# Patient Record
Sex: MALE | Race: WHITE | Employment: OTHER | URBAN - METROPOLITAN AREA
[De-identification: names, ages, dates, MRNs, and addresses within clinical notes are randomized per-mention and may not be internally consistent; named-entity substitution may affect disease eponyms.]

---

## 2021-09-30 ENCOUNTER — HOSPITAL ENCOUNTER (INPATIENT)
Age: 76
LOS: 20 days | Discharge: HOSPICE | DRG: 673 | End: 2021-10-20
Attending: EMERGENCY MEDICINE | Admitting: HOSPITALIST
Payer: MEDICARE

## 2021-09-30 ENCOUNTER — APPOINTMENT (OUTPATIENT)
Dept: CT IMAGING | Age: 76
DRG: 673 | End: 2021-09-30
Payer: MEDICARE

## 2021-09-30 DIAGNOSIS — N39.0 URINARY TRACT INFECTION WITHOUT HEMATURIA, SITE UNSPECIFIED: ICD-10-CM

## 2021-09-30 DIAGNOSIS — R41.82 ALTERED MENTAL STATUS, UNSPECIFIED ALTERED MENTAL STATUS TYPE: ICD-10-CM

## 2021-09-30 DIAGNOSIS — N17.9 ACUTE RENAL FAILURE SUPERIMPOSED ON CHRONIC KIDNEY DISEASE, UNSPECIFIED CKD STAGE, UNSPECIFIED ACUTE RENAL FAILURE TYPE (HCC): Primary | ICD-10-CM

## 2021-09-30 DIAGNOSIS — N18.9 ACUTE RENAL FAILURE SUPERIMPOSED ON CHRONIC KIDNEY DISEASE, UNSPECIFIED CKD STAGE, UNSPECIFIED ACUTE RENAL FAILURE TYPE (HCC): Primary | ICD-10-CM

## 2021-09-30 PROBLEM — G93.40 ACUTE ENCEPHALOPATHY: Status: ACTIVE | Noted: 2021-09-30

## 2021-09-30 LAB
A/G RATIO: 1.3 (ref 1.1–2.2)
ABO/RH: NORMAL
ALBUMIN SERPL-MCNC: 3.2 G/DL (ref 3.4–5)
ALP BLD-CCNC: 75 U/L (ref 40–129)
ALT SERPL-CCNC: 12 U/L (ref 10–40)
AMMONIA: 12 UMOL/L (ref 16–60)
ANION GAP SERPL CALCULATED.3IONS-SCNC: 13 MMOL/L (ref 3–16)
ANTIBODY SCREEN: NORMAL
AST SERPL-CCNC: 12 U/L (ref 15–37)
BACTERIA: ABNORMAL /HPF
BASE EXCESS VENOUS: -10.4 MMOL/L (ref -3–3)
BASOPHILS ABSOLUTE: 0 K/UL (ref 0–0.2)
BASOPHILS RELATIVE PERCENT: 0.5 %
BILIRUB SERPL-MCNC: 0.4 MG/DL (ref 0–1)
BILIRUBIN URINE: NEGATIVE
BLOOD, URINE: ABNORMAL
BUN BLDV-MCNC: 50 MG/DL (ref 7–20)
CALCIUM SERPL-MCNC: 8.8 MG/DL (ref 8.3–10.6)
CARBOXYHEMOGLOBIN: 1.4 % (ref 0–1.5)
CHLORIDE BLD-SCNC: 112 MMOL/L (ref 99–110)
CLARITY: ABNORMAL
CO2: 20 MMOL/L (ref 21–32)
COLOR: YELLOW
CREAT SERPL-MCNC: 3.4 MG/DL (ref 0.8–1.3)
EOSINOPHILS ABSOLUTE: 0.2 K/UL (ref 0–0.6)
EOSINOPHILS RELATIVE PERCENT: 5 %
EPITHELIAL CELLS, UA: ABNORMAL /HPF (ref 0–5)
GFR AFRICAN AMERICAN: 22
GFR NON-AFRICAN AMERICAN: 18
GLOBULIN: 2.5 G/DL
GLUCOSE BLD-MCNC: 151 MG/DL (ref 70–99)
GLUCOSE URINE: NEGATIVE MG/DL
HCO3 VENOUS: 15.4 MMOL/L (ref 23–29)
HCT VFR BLD CALC: 28.1 % (ref 40.5–52.5)
HEMOGLOBIN: 9.3 G/DL (ref 13.5–17.5)
KETONES, URINE: NEGATIVE MG/DL
LACTIC ACID: 0.9 MMOL/L (ref 0.4–2)
LEUKOCYTE ESTERASE, URINE: ABNORMAL
LYMPHOCYTES ABSOLUTE: 0.6 K/UL (ref 1–5.1)
LYMPHOCYTES RELATIVE PERCENT: 12.2 %
MCH RBC QN AUTO: 32.7 PG (ref 26–34)
MCHC RBC AUTO-ENTMCNC: 33.1 G/DL (ref 31–36)
MCV RBC AUTO: 98.8 FL (ref 80–100)
METHEMOGLOBIN VENOUS: 0.5 %
MICROSCOPIC EXAMINATION: YES
MONOCYTES ABSOLUTE: 0.4 K/UL (ref 0–1.3)
MONOCYTES RELATIVE PERCENT: 7.4 %
NEUTROPHILS ABSOLUTE: 3.5 K/UL (ref 1.7–7.7)
NEUTROPHILS RELATIVE PERCENT: 74.9 %
NITRITE, URINE: NEGATIVE
O2 SAT, VEN: 81 %
O2 THERAPY: ABNORMAL
OCCULT BLOOD SCREENING: NORMAL
PCO2, VEN: 33.7 MMHG (ref 40–50)
PDW BLD-RTO: 15 % (ref 12.4–15.4)
PH UA: 7.5 (ref 5–8)
PH VENOUS: 7.28 (ref 7.35–7.45)
PLATELET # BLD: 122 K/UL (ref 135–450)
PMV BLD AUTO: 8.7 FL (ref 5–10.5)
PO2, VEN: 48.5 MMHG (ref 25–40)
POTASSIUM SERPL-SCNC: 4.4 MMOL/L (ref 3.5–5.1)
PROCALCITONIN: 0.22 NG/ML (ref 0–0.15)
PROTEIN UA: 30 MG/DL
RBC # BLD: 2.85 M/UL (ref 4.2–5.9)
RBC UA: ABNORMAL /HPF (ref 0–4)
SODIUM BLD-SCNC: 145 MMOL/L (ref 136–145)
SPECIFIC GRAVITY UA: 1.01 (ref 1–1.03)
SPECIMEN STATUS: NORMAL
TCO2 CALC VENOUS: 16 MMOL/L
TOTAL PROTEIN: 5.7 G/DL (ref 6.4–8.2)
TROPONIN: 0.1 NG/ML
URINE TYPE: ABNORMAL
UROBILINOGEN, URINE: 0.2 E.U./DL
WBC # BLD: 4.7 K/UL (ref 4–11)
WBC UA: ABNORMAL /HPF (ref 0–5)

## 2021-09-30 PROCEDURE — 2580000003 HC RX 258: Performed by: NURSE PRACTITIONER

## 2021-09-30 PROCEDURE — 82803 BLOOD GASES ANY COMBINATION: CPT

## 2021-09-30 PROCEDURE — 84439 ASSAY OF FREE THYROXINE: CPT

## 2021-09-30 PROCEDURE — 86901 BLOOD TYPING SEROLOGIC RH(D): CPT

## 2021-09-30 PROCEDURE — 70450 CT HEAD/BRAIN W/O DYE: CPT

## 2021-09-30 PROCEDURE — 87040 BLOOD CULTURE FOR BACTERIA: CPT

## 2021-09-30 PROCEDURE — 99284 EMERGENCY DEPT VISIT MOD MDM: CPT

## 2021-09-30 PROCEDURE — 93005 ELECTROCARDIOGRAM TRACING: CPT | Performed by: NURSE PRACTITIONER

## 2021-09-30 PROCEDURE — 86850 RBC ANTIBODY SCREEN: CPT

## 2021-09-30 PROCEDURE — 84443 ASSAY THYROID STIM HORMONE: CPT

## 2021-09-30 PROCEDURE — 81001 URINALYSIS AUTO W/SCOPE: CPT

## 2021-09-30 PROCEDURE — 83605 ASSAY OF LACTIC ACID: CPT

## 2021-09-30 PROCEDURE — 85025 COMPLETE CBC W/AUTO DIFF WBC: CPT

## 2021-09-30 PROCEDURE — 82270 OCCULT BLOOD FECES: CPT

## 2021-09-30 PROCEDURE — 6360000002 HC RX W HCPCS: Performed by: NURSE PRACTITIONER

## 2021-09-30 PROCEDURE — 86900 BLOOD TYPING SEROLOGIC ABO: CPT

## 2021-09-30 PROCEDURE — 36415 COLL VENOUS BLD VENIPUNCTURE: CPT

## 2021-09-30 PROCEDURE — 84484 ASSAY OF TROPONIN QUANT: CPT

## 2021-09-30 PROCEDURE — 96365 THER/PROPH/DIAG IV INF INIT: CPT

## 2021-09-30 PROCEDURE — 1200000000 HC SEMI PRIVATE

## 2021-09-30 PROCEDURE — 84145 PROCALCITONIN (PCT): CPT

## 2021-09-30 PROCEDURE — 82140 ASSAY OF AMMONIA: CPT

## 2021-09-30 PROCEDURE — 80053 COMPREHEN METABOLIC PANEL: CPT

## 2021-09-30 RX ORDER — DEXTROSE MONOHYDRATE 50 MG/ML
100 INJECTION, SOLUTION INTRAVENOUS PRN
Status: DISCONTINUED | OUTPATIENT
Start: 2021-09-30 | End: 2021-10-20 | Stop reason: HOSPADM

## 2021-09-30 RX ORDER — 0.9 % SODIUM CHLORIDE 0.9 %
1000 INTRAVENOUS SOLUTION INTRAVENOUS ONCE
Status: COMPLETED | OUTPATIENT
Start: 2021-09-30 | End: 2021-09-30

## 2021-09-30 RX ORDER — SODIUM BICARBONATE 650 MG/1
650 TABLET ORAL DAILY
COMMUNITY

## 2021-09-30 RX ORDER — MAGNESIUM SULFATE IN WATER 40 MG/ML
2000 INJECTION, SOLUTION INTRAVENOUS PRN
Status: DISCONTINUED | OUTPATIENT
Start: 2021-09-30 | End: 2021-10-20 | Stop reason: HOSPADM

## 2021-09-30 RX ORDER — VITAMIN B COMPLEX
2000 TABLET ORAL DAILY
Status: DISCONTINUED | OUTPATIENT
Start: 2021-10-01 | End: 2021-09-30 | Stop reason: CLARIF

## 2021-09-30 RX ORDER — SODIUM CHLORIDE 9 MG/ML
INJECTION, SOLUTION INTRAVENOUS CONTINUOUS
Status: ACTIVE | OUTPATIENT
Start: 2021-09-30 | End: 2021-10-01

## 2021-09-30 RX ORDER — INSULIN GLARGINE 100 [IU]/ML
14 INJECTION, SOLUTION SUBCUTANEOUS NIGHTLY
Status: DISCONTINUED | OUTPATIENT
Start: 2021-09-30 | End: 2021-10-20 | Stop reason: HOSPADM

## 2021-09-30 RX ORDER — ONDANSETRON 2 MG/ML
4 INJECTION INTRAMUSCULAR; INTRAVENOUS EVERY 6 HOURS PRN
Status: DISCONTINUED | OUTPATIENT
Start: 2021-09-30 | End: 2021-10-20 | Stop reason: HOSPADM

## 2021-09-30 RX ORDER — SENNA PLUS 8.6 MG/1
1 TABLET ORAL DAILY PRN
Status: DISCONTINUED | OUTPATIENT
Start: 2021-09-30 | End: 2021-10-20 | Stop reason: HOSPADM

## 2021-09-30 RX ORDER — SODIUM CHLORIDE 0.9 % (FLUSH) 0.9 %
10 SYRINGE (ML) INJECTION EVERY 12 HOURS SCHEDULED
Status: DISCONTINUED | OUTPATIENT
Start: 2021-09-30 | End: 2021-10-20 | Stop reason: HOSPADM

## 2021-09-30 RX ORDER — NICOTINE POLACRILEX 4 MG
15 LOZENGE BUCCAL PRN
Status: DISCONTINUED | OUTPATIENT
Start: 2021-09-30 | End: 2021-10-20 | Stop reason: HOSPADM

## 2021-09-30 RX ORDER — CARVEDILOL 25 MG/1
25 TABLET ORAL 2 TIMES DAILY WITH MEALS
Status: DISCONTINUED | OUTPATIENT
Start: 2021-10-01 | End: 2021-10-05

## 2021-09-30 RX ORDER — ATORVASTATIN CALCIUM 40 MG/1
40 TABLET, FILM COATED ORAL NIGHTLY
COMMUNITY

## 2021-09-30 RX ORDER — PROMETHAZINE HYDROCHLORIDE 25 MG/1
12.5 TABLET ORAL EVERY 6 HOURS PRN
Status: DISCONTINUED | OUTPATIENT
Start: 2021-09-30 | End: 2021-10-20 | Stop reason: HOSPADM

## 2021-09-30 RX ORDER — SODIUM CHLORIDE 9 MG/ML
25 INJECTION, SOLUTION INTRAVENOUS PRN
Status: DISCONTINUED | OUTPATIENT
Start: 2021-09-30 | End: 2021-10-20 | Stop reason: HOSPADM

## 2021-09-30 RX ORDER — DEXTROSE MONOHYDRATE 25 G/50ML
12.5 INJECTION, SOLUTION INTRAVENOUS PRN
Status: DISCONTINUED | OUTPATIENT
Start: 2021-09-30 | End: 2021-10-20 | Stop reason: HOSPADM

## 2021-09-30 RX ORDER — ASPIRIN 81 MG/1
81 TABLET ORAL DAILY
Status: DISCONTINUED | OUTPATIENT
Start: 2021-10-01 | End: 2021-10-20 | Stop reason: HOSPADM

## 2021-09-30 RX ORDER — SODIUM CHLORIDE 0.9 % (FLUSH) 0.9 %
10 SYRINGE (ML) INJECTION PRN
Status: DISCONTINUED | OUTPATIENT
Start: 2021-09-30 | End: 2021-10-20 | Stop reason: HOSPADM

## 2021-09-30 RX ORDER — LINEZOLID 2 MG/ML
600 INJECTION, SOLUTION INTRAVENOUS EVERY 12 HOURS
Status: DISCONTINUED | OUTPATIENT
Start: 2021-09-30 | End: 2021-10-04

## 2021-09-30 RX ORDER — ATORVASTATIN CALCIUM 10 MG/1
20 TABLET, FILM COATED ORAL DAILY
Status: DISCONTINUED | OUTPATIENT
Start: 2021-10-01 | End: 2021-10-20 | Stop reason: HOSPADM

## 2021-09-30 RX ORDER — VITAMIN B COMPLEX
2000 TABLET ORAL DAILY
Status: DISCONTINUED | OUTPATIENT
Start: 2021-10-01 | End: 2021-10-20 | Stop reason: HOSPADM

## 2021-09-30 RX ADMIN — CEFTRIAXONE SODIUM 1000 MG: 1 INJECTION, POWDER, FOR SOLUTION INTRAMUSCULAR; INTRAVENOUS at 18:35

## 2021-09-30 RX ADMIN — SODIUM CHLORIDE 1000 ML: 9 INJECTION, SOLUTION INTRAVENOUS at 16:45

## 2021-09-30 NOTE — ED PROVIDER NOTES
I independently performed a history and physical on [facility]. All diagnostic, treatment, and disposition decisions were made by myself in conjunction with the advanced practice provider. For further details of [facility] emergency department encounter, please see the IONA/resident's documentation. Briefly, this is a 19-year-old male who presents from his nursing facility for evaluation of altered mental status, rectal bleeding. Patient has history of type 2 diabetes, hypertension. Patient has an unclear mental status baseline. He is alert, not oriented. There is no obvious rectal bleeding noted on exam today but his hemoglobin is down a number of points from prior. He is afebrile, no signs of meningitis. No focal deficits. He will likely require admission for continued management of altered mental status, serial H&H. EKG  The Ekg interpreted by myself in the emergency department in the absence of a cardiologist.  normal sinus rhythm with a rate of 80  Axis is   Normal  QTc is  465  Intervals and Durations are unremarkable. No specific ST-T wave changes appreciated. No evidence of acute ischemia.    No significant change from prior EKG dated 1/27/16       Azael Pike MD  09/30/21 1714       Azael Pike MD  09/30/21 5445

## 2021-09-30 NOTE — ED PROVIDER NOTES
[address] [place] [address]  ED  EMERGENCY DEPARTMENT ENCOUNTER      This patient was seen and evaluated by the attending physician. Pt Name: Will Us  MRN: [id number]  Gam[place]fpeg 1945  Date of evaluation: 9/30/2021  Provider: DANGELO Moreno - CNP-C  PCP: Reginaldo Lora      History provided by EMS    CHIEFCOMPLAINT:     Chief Complaint   Patient presents with    Altered Mental Status     Pt arrived [place], mental status change over the past 2 days. Pt is normally alert and oriented per EMS report. Pt now implusive alert only to self. Pt was found to have moderate blood today from rectum.        HISTORY OF PRESENT ILLNESS:      Will Us is a 78 y.o. male who presents to [address] [place] [address]  ED with complaints of altered mental state. Patient is from nursing facility, they report the patient mental status has been declining over the past month, today states that he is normally alert and oriented, currently is only oriented to self. They also mention that patient had some blood per rectum. Patient is pleasantly confused, denies any complaints but does not know why he is here where he has had he is able to tell me his name. He is here for further evaluation. LOCATION:-  QUALITY:-  SEVERITY:-  DURATION:-  MODIFYING FACTORS:-    Nursing Notes were reviewed     REVIEW OF SYSTEMS:     Review of Systems  All systems, a total of 10, are reviewed and negative except for those that were just noted in history present illness.         PAST MEDICAL HISTORY:     Past Medical History:   Diagnosis Date    CAD (coronary artery disease)     Chronic renal disease     Dr. 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 ASPIRIN 81 MG TABLET    Take 81 mg by mouth daily    CARVEDILOL (COREG) 25 MG TABLET    Take 25 mg by mouth 2 times daily (with meals). CHOLECALCIFEROL (VITAMIN D3) 2000 UNITS CAPS    Take  by mouth daily. GLIPIZIDE (GLUCOTROL) 10 MG TABLET    TAKE ONE TABLET BY MOUTH TWICE A DAY BEFORE MEALS    GLUCOSE BLOOD VI TEST STRIPS (ACCU-CHEK ROBERT) STRP    1 Container by In Vitro route daily    INSULIN DETEMIR (LEVEMIR FLEXTOUCH) 100 UNIT/ML INJECTION PEN    Inject 14 Units into the skin nightly    INSULIN PEN NEEDLE 31G X 8 MM MISC    1 each by Does not apply route daily. IRON TABS    Take 25 mg by mouth. OMEGA-3 FATTY ACIDS (OMEGA 3 PO)    Take 1,200 mg by mouth 3 times daily.     SIMVASTATIN (ZOCOR) 40 MG TABLET    TAKE ONE TABLET BY MOUTH ONCE NIGHTLY    TORSEMIDE (DEMADEX) 20 MG TABLET    Take 1/2---1  qd         ALLERGIES:    Ace inhibitors and Morphine    FAMILY HISTORY:       Family History   Problem Relation Age of Onset    Diabetes Mother           SOCIAL HISTORY:     Social History     Socioeconomic History    Marital status:      Spouse name: None    Number of children: None    Years of education: None    Highest education level: None   Occupational History    None   Tobacco Use    Smoking status: Never Smoker    Smokeless tobacco: Never Used   Substance and Sexual Activity    Alcohol use: No    Drug use: No    Sexual activity: Never   Other Topics Concern    None   Social History Narrative    None     Social Determinants of Health     Financial Resource Strain:     Difficulty of Paying Living Expenses:    Food Insecurity:     Worried About Running Out of Food in the Last Year:     Lianet of Food in the Last Year:    Transportation Needs:     Lack of Transportation (Medical):  Lack of Transportation (Non-Medical):    Physical Activity:     Days of Exercise per Week:     Minutes of Exercise per Session:    Stress:     Feeling of Stress :    Social Connections:     Frequency of Communication with Friends and Family:     Frequency of Social Gatherings with Friends and Family:     Attends [employer] Services:     Active Member of Clubs or Organizations:     Attends Club or Organization Meetings:     Marital Status:    Intimate Partner Violence:     Fear of Current or Ex-Partner:     Emotionally Abused:     Physically Abused:     Sexually Abused:        SCREENINGS:             PHYSICAL EXAM:       ED Triage Vitals [09/30/21 1353]   BP Temp Temp Source Pulse Resp SpO2 Height Weight   (!) 142/73 98.1 °F (36.7 °C) Oral 78 18 96 % 5' 8\" (1.727 m) 150 lb (68 kg)       Physical Exam    CONSTITUTIONAL: Awake and alert. Cooperative. Well-developed. Well-nourished. Vitals:    09/30/21 1353   BP: (!) 142/73   Pulse: 78   Resp: 18   Temp: 98.1 °F (36.7 °C)   TempSrc: Oral   SpO2: 96%   Weight: 150 lb (68 kg)   Height: 5' 8\" (1.727 m)     HENT: Normocephalic. Atraumatic. External ears normal, without discharge. TMs clear bilaterally. Nonasal discharge. Oropharynx clear, no erythema. Mucous membranes moist.  EYES: Conjunctiva non-injected, nolid abnormalities noted. No scleral icterus. PERRL. EOM's grossly intact. Anterior chambers clear. NECK: Supple. Normal ROM. No meningismus. No thyroid tenderness or swelling noted. CARDIOVASCULAR: RRR. No Murmer. No carotid bruits. PULMONARY/CHEST WALL: Effort normal. No tachypnea. Lungs clear to ausculation. ABDOMEN: Normal BS. Soft. Nondistended. No tenderness to palpation. No guarding. No hernias noted. No splenomegaly. Back: Spine is midline. No ecchymosis. No crepituson palpation. No obvious subluxation of vertebral column.  No saddle anesthesia or evidence of cauda equina. /ANORECTAL: good rectal tone, melanotic appearing stool noted. MUSKULOSKELETAL: Normal ROM. No acute deformities. No edema. No tenderness to palpate. SKIN: Warm and dry. NEUROLOGICAL:  GCS 15. CN II-XII grossly intact. Strength is 5/5 in all extremities and sensation is intact. PSYCHIATRIC: Only oriented to self.         DIAGNOSTIC RESULTS:     LABS:    Results for orders placed or performed during the hospital encounter of 09/30/21   CBC auto differential   Result Value Ref Range    WBC 4.7 4.0 - 11.0 K/uL    RBC 2.85 (L) 4.20 - 5.90 M/uL    Hemoglobin 9.3 (L) 13.5 - 17.5 g/dL    Hematocrit 28.1 (L) 40.5 - 52.5 %    MCV 98.8 80.0 - 100.0 fL    MCH 32.7 26.0 - 34.0 pg    MCHC 33.1 31.0 - 36.0 g/dL    RDW 15.0 12.4 - 15.4 %    Platelets 149 (L) 920 - 450 K/uL    MPV 8.7 5.0 - 10.5 fL    Neutrophils % 74.9 %    Lymphocytes % 12.2 %    Monocytes % 7.4 %    Eosinophils % 5.0 %    Basophils % 0.5 %    Neutrophils Absolute 3.5 1.7 - 7.7 K/uL    Lymphocytes Absolute 0.6 (L) 1.0 - 5.1 K/uL    Monocytes Absolute 0.4 0.0 - 1.3 K/uL    Eosinophils Absolute 0.2 0.0 - 0.6 K/uL    Basophils Absolute 0.0 0.0 - 0.2 K/uL   Comprehensive metabolic panel   Result Value Ref Range    Sodium 145 136 - 145 mmol/L    Potassium 4.4 3.5 - 5.1 mmol/L    Chloride 112 (H) 99 - 110 mmol/L    CO2 20 (L) 21 - 32 mmol/L    Anion Gap 13 3 - 16    Glucose 151 (H) 70 - 99 mg/dL    BUN 50 (H) 7 - 20 mg/dL    CREATININE 3.4 (H) 0.8 - 1.3 mg/dL    GFR Non-African American 18 (A) >60    GFR  22 (A) >60    Calcium 8.8 8.3 - 10.6 mg/dL    Total Protein 5.7 (L) 6.4 - 8.2 g/dL    Albumin 3.2 (L) 3.4 - 5.0 g/dL    Albumin/Globulin Ratio 1.3 1.1 - 2.2    Total Bilirubin 0.4 0.0 - 1.0 mg/dL    Alkaline Phosphatase 75 40 - 129 U/L    ALT 12 10 - 40 U/L    AST 12 (L) 15 - 37 U/L    Globulin 2.5 g/dL   Urinalysis   Result Value Ref Range    Color, UA Yellow Straw/Yellow    Clarity, UA CLOUDY (A) Clear    Glucose, Ur Negative Negative mg/dL    Bilirubin Urine Negative Negative    Ketones, Urine Negative Negative mg/dL    Specific Gravity, UA 1.015 1.005 - 1.030    Blood, Urine SMALL (A) Negative    pH, UA 7.5 5.0 - 8.0    Protein, UA 30 (A) Negative mg/dL    Urobilinogen, Urine 0.2 <2.0 E.U./dL    Nitrite, Urine Negative Negative    Leukocyte Esterase, Urine SMALL (A) Negative    Microscopic Examination YES     Urine Type NotGiven    Troponin   Result Value Ref Range    Troponin 0.10 (H) <0.01 ng/mL   Sample possible blood bank testing   Result Value Ref Range    Specimen Status ANNALISA    Microscopic Urinalysis   Result Value Ref Range    WBC, UA 21-50 (A) 0 - 5 /HPF    RBC, UA 5-10 (A) 0 - 4 /HPF    Epithelial Cells, UA 0-1 0 - 5 /HPF    Bacteria, UA 3+ (A) None Seen /HPF   Blood Occult Stool Screen #1   Result Value Ref Range    Occult Blood Screening Result: Negative  Normal range: Negative      Lactic acid, plasma   Result Value Ref Range    Lactic Acid 0.9 0.4 - 2.0 mmol/L   Procalcitonin   Result Value Ref Range    Procalcitonin 0.22 (H) 0.00 - 0.15 ng/mL   Ammonia   Result Value Ref Range    Ammonia 12 (L) 16 - 60 umol/L   Blood gas, venous   Result Value Ref Range    pH, Go 7.278 (L) 7.350 - 7.450    pCO2, Go 33.7 (L) 40.0 - 50.0 mmHg    pO2, Go 48.5 (H) 25 - 40 mmHg    HCO3, Venous 15.4 (L) 23.0 - 29.0 mmol/L    Base Excess, Go -10.4 (L) -3.0 - 3.0 mmol/L    O2 Sat, Og 81 Not Established %    Carboxyhemoglobin 1.4 0.0 - 1.5 %    MetHgb, Go 0.5 <1.5 %    TC02 (Calc), Go 16 Not Established mmol/L    O2 Therapy Unknown    EKG 12 Lead   Result Value Ref Range    Ventricular Rate 80 BPM    Atrial Rate 80 BPM    P-R Interval 186 ms    QRS Duration 100 ms    Q-T Interval 404 ms    QTc Calculation (Bazett) 465 ms    P Axis 30 degrees    R Axis -28 degrees    T Axis 171 degrees    Diagnosis       Normal sinus rhythmST & T wave abnormality, consider inferolateral ischemiaProlonged QTAbnormal ECGNo previous ECGs available TYPE AND SCREEN   Result Value Ref Range    ABO/Rh O POS     Antibody Screen NEG          RADIOLOGY:  All x-ray studies are viewed/reviewed by me. Formal interpretations per the radiologist are as follows:      CT HEAD WO CONTRAST   Final Result   No acute intracranial abnormality. Small right mastoid effusion         US RETROPERITONEAL COMPLETE    (Results Pending)           EKG:  See EKG interpretation by an attending physician. PROCEDURES:   N/A    CRITICAL CARE TIME:   N/A    CONSULTS:  None      EMERGENCY DEPARTMENT COURSE andDIFFERENTIAL DIAGNOSIS/MDM:   Vitals:    Vitals:    09/30/21 1353   BP: (!) 142/73   Pulse: 78   Resp: 18   Temp: 98.1 °F (36.7 °C)   TempSrc: Oral   SpO2: 96%   Weight: 150 lb (68 kg)   Height: 5' 8\" (1.727 m)       Patient wasgiven the following medications:  Medications - No data to display      Patient was evaluated in conjunction with Dr. Josie Hewitt   Patient presented to the emergency room today with complaints of altered mental state. Patient was pleasantly confused, apparently his baseline is that he is alert and oriented x3 however nursing home states that this has been declining over the last month. Patient urinalysis does show urinary tract infection, he was started on antibiotics for this. CT of the head was negative. Patient does have chronic renal disease at baseline however worse today, creatinine is up from 2.2-3.4. Patient rectal exam did show dark melanotic appearing stool however his guaiac was negative. His hemoglobin is only 9.3 I do not have a recent hemoglobin to compare to. Patient physical exam was otherwise unremarkable, given his lab abnormalities and altered mental state with a UTI do feel the patient requires admission the hospital.  Patient started on antibiotics, admitted in stable condition. Patient was evaluated by the attending physician who agrees with the plan.     Patient laboratory studies, radiographic imaging, and assessment were all discussed with the patient and/orpatient family. There was shared decision-making between myself as well as the patient and/or their surrogate and we are all in agreement with admission. There was an opportunity for questions and all questions were answered tothe best of my ability and to the satisfaction of the patient and/or patient family. FINAL IMPRESSION:      1. Acute renal failure superimposed on chronic kidney disease, unspecified CKD stage, unspecified acute renal failure type ([facility] [address]    2. Urinary tract infection without hematuria, site unspecified    3. Altered mental status, unspecified altered mental status type          DISPOSITION/PLAN:   DISPOSITION   Admit    PATIENT REFERRED TO:  No follow-up provider specified.     DISCHARGE MEDICATIONS:  New Prescriptions    No medications on file                  (Please note thatportions of this note were completed with a voice recognition program.  Efforts were made to edit the dictations, but occasionally words are mis-transcribed.)    DANGELO Hahn CNP-C (electronicallysigned)       DANGELO Hahn CNP  09/30/21 [id number]

## 2021-09-30 NOTE — ED NOTES
Bed: 25  Expected date:   Expected time:   Means of arrival: [facility]  Comments:  [address] [place] [address]  09/30/21 1166

## 2021-10-01 ENCOUNTER — APPOINTMENT (OUTPATIENT)
Dept: GENERAL RADIOLOGY | Age: 76
DRG: 673 | End: 2021-10-01
Payer: MEDICARE

## 2021-10-01 ENCOUNTER — APPOINTMENT (OUTPATIENT)
Dept: ULTRASOUND IMAGING | Age: 76
DRG: 673 | End: 2021-10-01
Payer: MEDICARE

## 2021-10-01 ENCOUNTER — APPOINTMENT (OUTPATIENT)
Dept: MRI IMAGING | Age: 76
DRG: 673 | End: 2021-10-01
Payer: MEDICARE

## 2021-10-01 LAB
A/G RATIO: 1.6 (ref 1.1–2.2)
ALBUMIN SERPL-MCNC: 3.5 G/DL (ref 3.4–5)
ALP BLD-CCNC: 71 U/L (ref 40–129)
ALT SERPL-CCNC: 10 U/L (ref 10–40)
ANION GAP SERPL CALCULATED.3IONS-SCNC: 14 MMOL/L (ref 3–16)
AST SERPL-CCNC: 11 U/L (ref 15–37)
BASOPHILS ABSOLUTE: 0 K/UL (ref 0–0.2)
BASOPHILS RELATIVE PERCENT: 0.6 %
BILIRUB SERPL-MCNC: 0.3 MG/DL (ref 0–1)
BUN BLDV-MCNC: 44 MG/DL (ref 7–20)
CALCIUM SERPL-MCNC: 8.5 MG/DL (ref 8.3–10.6)
CHLORIDE BLD-SCNC: 113 MMOL/L (ref 99–110)
CHLORIDE URINE RANDOM: 70 MMOL/L
CO2: 18 MMOL/L (ref 21–32)
CREAT SERPL-MCNC: 3 MG/DL (ref 0.8–1.3)
CREATININE URINE: 87.1 MG/DL (ref 39–259)
EKG ATRIAL RATE: 80 BPM
EKG DIAGNOSIS: NORMAL
EKG P AXIS: 30 DEGREES
EKG P-R INTERVAL: 186 MS
EKG Q-T INTERVAL: 404 MS
EKG QRS DURATION: 100 MS
EKG QTC CALCULATION (BAZETT): 465 MS
EKG R AXIS: -28 DEGREES
EKG T AXIS: 171 DEGREES
EKG VENTRICULAR RATE: 80 BPM
EOSINOPHILS ABSOLUTE: 0.3 K/UL (ref 0–0.6)
EOSINOPHILS RELATIVE PERCENT: 5.3 %
ESTIMATED AVERAGE GLUCOSE: 122.6 MG/DL
FOLATE: 10.64 NG/ML (ref 4.78–24.2)
GFR AFRICAN AMERICAN: 25
GFR NON-AFRICAN AMERICAN: 21
GLOBULIN: 2.2 G/DL
GLUCOSE BLD-MCNC: 103 MG/DL (ref 70–99)
GLUCOSE BLD-MCNC: 115 MG/DL (ref 70–99)
GLUCOSE BLD-MCNC: 117 MG/DL (ref 70–99)
GLUCOSE BLD-MCNC: 119 MG/DL (ref 70–99)
GLUCOSE BLD-MCNC: 133 MG/DL (ref 70–99)
GLUCOSE BLD-MCNC: 133 MG/DL (ref 70–99)
GLUCOSE BLD-MCNC: 216 MG/DL (ref 70–99)
HBA1C MFR BLD: 5.9 %
HCT VFR BLD CALC: 26.7 % (ref 40.5–52.5)
HCT VFR BLD CALC: 27 % (ref 40.5–52.5)
HCT VFR BLD CALC: 27.9 % (ref 40.5–52.5)
HEMOGLOBIN: 9 G/DL (ref 13.5–17.5)
HEMOGLOBIN: 9.1 G/DL (ref 13.5–17.5)
HEMOGLOBIN: 9.5 G/DL (ref 13.5–17.5)
IRON SATURATION: 28 % (ref 20–50)
IRON: 43 UG/DL (ref 59–158)
LV EF: 18 %
LVEF MODALITY: NORMAL
LYMPHOCYTES ABSOLUTE: 0.5 K/UL (ref 1–5.1)
LYMPHOCYTES RELATIVE PERCENT: 10.9 %
MCH RBC QN AUTO: 32.5 PG (ref 26–34)
MCHC RBC AUTO-ENTMCNC: 33.9 G/DL (ref 31–36)
MCV RBC AUTO: 95.8 FL (ref 80–100)
MONOCYTES ABSOLUTE: 0.4 K/UL (ref 0–1.3)
MONOCYTES RELATIVE PERCENT: 8.6 %
NEUTROPHILS ABSOLUTE: 3.6 K/UL (ref 1.7–7.7)
NEUTROPHILS RELATIVE PERCENT: 74.6 %
OSMOLALITY URINE: 508 MOSM/KG (ref 390–1070)
PDW BLD-RTO: 14.9 % (ref 12.4–15.4)
PERFORMED ON: ABNORMAL
PLATELET # BLD: 127 K/UL (ref 135–450)
PMV BLD AUTO: 9.5 FL (ref 5–10.5)
POTASSIUM REFLEX MAGNESIUM: 4.6 MMOL/L (ref 3.5–5.1)
POTASSIUM, UR: 33.2 MMOL/L
RBC # BLD: 2.78 M/UL (ref 4.2–5.9)
SODIUM BLD-SCNC: 145 MMOL/L (ref 136–145)
SODIUM URINE: 83 MMOL/L
T4 FREE: 1.4 NG/DL (ref 0.9–1.8)
TOTAL IRON BINDING CAPACITY: 153 UG/DL (ref 260–445)
TOTAL PROTEIN: 5.7 G/DL (ref 6.4–8.2)
TOTAL SYPHILLIS IGG/IGM: NORMAL
TROPONIN: 0.08 NG/ML
TROPONIN: 0.09 NG/ML
TSH SERPL DL<=0.05 MIU/L-ACNC: 2.4 UIU/ML (ref 0.27–4.2)
VITAMIN B-12: 1026 PG/ML (ref 211–911)
WBC # BLD: 4.8 K/UL (ref 4–11)

## 2021-10-01 PROCEDURE — 76770 US EXAM ABDO BACK WALL COMP: CPT

## 2021-10-01 PROCEDURE — C9113 INJ PANTOPRAZOLE SODIUM, VIA: HCPCS | Performed by: HOSPITALIST

## 2021-10-01 PROCEDURE — 97116 GAIT TRAINING THERAPY: CPT

## 2021-10-01 PROCEDURE — 2580000003 HC RX 258: Performed by: HOSPITALIST

## 2021-10-01 PROCEDURE — 97530 THERAPEUTIC ACTIVITIES: CPT

## 2021-10-01 PROCEDURE — 82607 VITAMIN B-12: CPT

## 2021-10-01 PROCEDURE — 6370000000 HC RX 637 (ALT 250 FOR IP): Performed by: HOSPITALIST

## 2021-10-01 PROCEDURE — 99223 1ST HOSP IP/OBS HIGH 75: CPT | Performed by: NURSE PRACTITIONER

## 2021-10-01 PROCEDURE — 93010 ELECTROCARDIOGRAM REPORT: CPT | Performed by: INTERNAL MEDICINE

## 2021-10-01 PROCEDURE — 6360000002 HC RX W HCPCS: Performed by: HOSPITALIST

## 2021-10-01 PROCEDURE — 85014 HEMATOCRIT: CPT

## 2021-10-01 PROCEDURE — 84300 ASSAY OF URINE SODIUM: CPT

## 2021-10-01 PROCEDURE — 82436 ASSAY OF URINE CHLORIDE: CPT

## 2021-10-01 PROCEDURE — 97162 PT EVAL MOD COMPLEX 30 MIN: CPT

## 2021-10-01 PROCEDURE — 84484 ASSAY OF TROPONIN QUANT: CPT

## 2021-10-01 PROCEDURE — 83550 IRON BINDING TEST: CPT

## 2021-10-01 PROCEDURE — 93306 TTE W/DOPPLER COMPLETE: CPT

## 2021-10-01 PROCEDURE — 93971 EXTREMITY STUDY: CPT

## 2021-10-01 PROCEDURE — 36415 COLL VENOUS BLD VENIPUNCTURE: CPT

## 2021-10-01 PROCEDURE — 86780 TREPONEMA PALLIDUM: CPT

## 2021-10-01 PROCEDURE — 83036 HEMOGLOBIN GLYCOSYLATED A1C: CPT

## 2021-10-01 PROCEDURE — 80053 COMPREHEN METABOLIC PANEL: CPT

## 2021-10-01 PROCEDURE — 97166 OT EVAL MOD COMPLEX 45 MIN: CPT

## 2021-10-01 PROCEDURE — 83540 ASSAY OF IRON: CPT

## 2021-10-01 PROCEDURE — 71045 X-RAY EXAM CHEST 1 VIEW: CPT

## 2021-10-01 PROCEDURE — 82570 ASSAY OF URINE CREATININE: CPT

## 2021-10-01 PROCEDURE — 99223 1ST HOSP IP/OBS HIGH 75: CPT | Performed by: INTERNAL MEDICINE

## 2021-10-01 PROCEDURE — 1200000000 HC SEMI PRIVATE

## 2021-10-01 PROCEDURE — 85025 COMPLETE CBC W/AUTO DIFF WBC: CPT

## 2021-10-01 PROCEDURE — 82746 ASSAY OF FOLIC ACID SERUM: CPT

## 2021-10-01 PROCEDURE — 85018 HEMOGLOBIN: CPT

## 2021-10-01 PROCEDURE — 84133 ASSAY OF URINE POTASSIUM: CPT

## 2021-10-01 PROCEDURE — 83935 ASSAY OF URINE OSMOLALITY: CPT

## 2021-10-01 PROCEDURE — 97535 SELF CARE MNGMENT TRAINING: CPT

## 2021-10-01 RX ORDER — PANTOPRAZOLE SODIUM 40 MG/10ML
40 INJECTION, POWDER, LYOPHILIZED, FOR SOLUTION INTRAVENOUS 2 TIMES DAILY
Status: DISCONTINUED | OUTPATIENT
Start: 2021-10-01 | End: 2021-10-02

## 2021-10-01 RX ADMIN — SODIUM CHLORIDE: 9 INJECTION, SOLUTION INTRAVENOUS at 00:01

## 2021-10-01 RX ADMIN — Medication 2000 UNITS: at 18:19

## 2021-10-01 RX ADMIN — CARVEDILOL 25 MG: 25 TABLET, FILM COATED ORAL at 18:16

## 2021-10-01 RX ADMIN — LINEZOLID 600 MG: 600 INJECTION, SOLUTION INTRAVENOUS at 12:36

## 2021-10-01 RX ADMIN — CEFTRIAXONE SODIUM 1000 MG: 1 INJECTION, POWDER, FOR SOLUTION INTRAMUSCULAR; INTRAVENOUS at 00:03

## 2021-10-01 RX ADMIN — ATORVASTATIN CALCIUM 20 MG: 10 TABLET, FILM COATED ORAL at 18:18

## 2021-10-01 RX ADMIN — PANTOPRAZOLE SODIUM 40 MG: 40 INJECTION, POWDER, FOR SOLUTION INTRAVENOUS at 09:22

## 2021-10-01 RX ADMIN — INSULIN GLARGINE 14 UNITS: 100 INJECTION, SOLUTION SUBCUTANEOUS at 00:47

## 2021-10-01 RX ADMIN — SODIUM CHLORIDE, PRESERVATIVE FREE 10 ML: 5 INJECTION INTRAVENOUS at 09:28

## 2021-10-01 RX ADMIN — LINEZOLID 600 MG: 600 INJECTION, SOLUTION INTRAVENOUS at 00:47

## 2021-10-01 ASSESSMENT — ENCOUNTER SYMPTOMS
ALLERGIC/IMMUNOLOGIC NEGATIVE: 1
EYES NEGATIVE: 1
RESPIRATORY NEGATIVE: 1
BLOOD IN STOOL: 1

## 2021-10-01 ASSESSMENT — PAIN SCALES - GENERAL: PAINLEVEL_OUTOF10: 0

## 2021-10-01 NOTE — CONSULTS
Gastroenterology Consult Note    Patient:   Tk Keller   :    1945   Facility:     [facility] [address] [facility]  [address] [address]   Referring/PCP: Gracy Martin  Date:     10/1/2021  Consultant:   Herb Bernal DO    Subjective: This 78 y.o. male was admitted 2021 with a diagnosis of \"Acute encephalopathy [G93.40]  Urinary tract infection without hematuria, site unspecified [N39.0]  Altered mental status, unspecified altered mental status type [R41.82]  Acute renal failure superimposed on chronic kidney disease, unspecified CKD stage, unspecified acute renal failure type ([address] [N17.9, N18.9]\" and is seen in consultation regarding mental status changes    68-year-old male with history of coronary disease hyperlipidemia hypertension type 2 diabetes who presented with confusion. There was concern of the patient may have had some dark stool however the nurses notes that he has not been having any noticeable active bleeding. Hemoglobin has been steady in the nines. Patient is confused but states he does not want to have endoscopic evaluation. He would likely be difficult to prep for colonoscopy. He may not be cooperative for upper endoscopy.   Past Medical History:   Diagnosis Date    CAD (coronary artery disease)     Chronic renal disease     Dr. 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 ([facility] [address]     Type II or unspecified type diabetes mellitus without mention of complication, not stated as uncontrolled      Past Surgical History:   Procedure Laterality Date    CARDIAC SURGERY  2006    quad by pass    COLONOSCOPY      pt refuses to have one    EYE SURGERY Bilateral    [address] LUNG SURGERY  2006    scraped lungs       Social:   Social History     Tobacco Use    Smoking status: Never Smoker    Smokeless tobacco: Never Used   Substance Use Topics    Alcohol use: No     Family: Family History   Problem Relation Age of Onset    Diabetes Mother        Scheduled Medications:    pantoprazole  40 mg IntraVENous BID    linezolid  600 mg IntraVENous Q12H    cefTRIAXone (ROCEPHIN) IV  1,000 mg IntraVENous Q24H    carvedilol  25 mg Oral BID WC    insulin glargine  14 Units SubCUTAneous Nightly    atorvastatin  20 mg Oral Daily    [Held by provider] aspirin  81 mg Oral Daily    sodium chloride flush  10 mL IntraVENous 2 times per day    insulin lispro  0-12 Units SubCUTAneous TID WC    insulin lispro  0-6 Units SubCUTAneous Nightly    Vitamin D  2,000 Units Oral Daily     Infusions:    sodium chloride      dextrose       PRN Medications: sodium chloride flush, sodium chloride, magnesium sulfate, promethazine **OR** ondansetron, senna, perflutren lipid microspheres, glucose, dextrose, glucagon (rDNA), dextrose  Allergies: Allergies   Allergen Reactions    Ace Inhibitors      And arbs  cough    Morphine        ROS:   Review of Systems   Constitutional: Negative. HENT: Negative. Eyes: Negative. Respiratory: Negative. Cardiovascular: Negative. Gastrointestinal: Positive for blood in stool. Endocrine: Negative. Genitourinary: Negative. Musculoskeletal: Negative. Skin: Negative. Allergic/Immunologic: Negative. Neurological: Negative. Hematological: Negative. Psychiatric/Behavioral: Positive for confusion. Objective:     Physical Exam:   Vitals:    10/01/21 1816   BP: (!) 148/77   Pulse: 88   Resp:    Temp:    SpO2:      I/O last 3 completed shifts:   In: 0   Out: 150 [Urine:150]   General appearance: alert, appears stated age and cooperative  HEENT: PERRLA  Neck: no adenopathy, no carotid bruit, no JVD, supple, symmetrical, trachea midline and thyroid not enlarged, symmetric, no tenderness/mass/nodules  Lungs: clear to auscultation bilaterally  Heart: regular rate and rhythm, S1, S2 normal, no murmur, click, rub or gallop  Abdomen: soft, non-tender; bowel sounds normal; no masses,  no organomegaly  Extremities: extremities normal, atraumatic, no cyanosis or edema     Lab and Imaging Review   Labs:  CBC:   Recent Labs     09/30/21  1437 09/30/21  1437 10/01/21  0623 10/01/21  1100 10/01/21  1600   WBC 4.7  --  4.8  --   --    HGB 9.3*   < > 9.0* 9.5* 9.1*   HCT 28.1*   < > 26.7* 27.9* 27.0*   MCV 98.8  --  95.8  --   --    *  --  127*  --   --     < > = values in this interval not displayed. BMP:   Recent Labs     09/30/21  1437 10/01/21  0623    145   K 4.4 4.6   * 113*   CO2 20* 18*   BUN 50* 44*   CREATININE 3.4* 3.0*     LIVER PROFILE:   Recent Labs     09/30/21  1437 10/01/21  0623   AST 12* 11*   ALT 12 10   PROT 5.7* 5.7*   BILITOT 0.4 0.3   ALKPHOS 75 71     PT/INR: No results for input(s): INR in the last 72 hours. Invalid input(s): PT    IMAGING:  Echo Complete    Result Date: 10/1/2021  Transthoracic Echocardiography Report (TTE)  Demographics   Patient Name       Ino Hensley   Date of Study      10/01/2021         Gender               Male   Patient Number     [id number]         Date of Birth        1945   Visit Number       [id number]          Age                  78 year(s)   Accession Number   [id number]         Room Number          4313   Corporate ID       [id number]           Sonographer          Sylvia Benjamin, RT   Ordering Physician Hosea Real,  Interpreting         [address] [place] [address].                     MD                 Physician            Lorena HOSKINS MD  Procedure Type of Study   TTE procedure:ECHOCARDIOGRAM COMPLETE 2D W DOPPLER W COLOR. Procedure Date Date: 10/01/2021 Start: 08:23 AM Study Location: [address] [place] [address] - Echo Lab Technical Quality: Adequate visualization Indications:Abnormal ECG.  Patient Status: Routine Height: 68 inches Weight: 150 pounds BSA: 1.81 m2 BMI: 22.81 kg/m2 HR: 84 bpm BP: 138/71 mmHg  Conclusions   Summary  The left ventricular systolic function is severely reduced with an ejection  fraction of 15-20 %. There is global with regional variations of wall motion, inferior wall moves  best  Grade II diastolic dysfunction with elevated filling pressure. Moderate mitral regurgitation. Mild aortic, tricuspid and aortic regurgitation. Right ventricular systolic function is moderately reduced . Systolic pulmonic artery pressure (SPAP) is estimated at 48 mmHg consistent  with mild pulmonary hypertension (Right atrial pressure of 8 mmHg). Signature   ------------------------------------------------------------------  Electronically signed by Violetta Red MD (Interpreting  physician) on 10/01/2021 at 02:39 PM  ------------------------------------------------------------------   Findings   Left Ventricle  The left ventricular systolic function is severely reduced with an ejection  fraction of 15-20 %. There is global with regional variations of wall motion, inferior wall moves  best  Normal left ventricle size and wall thickness. Grade II diastolic dysfunction with elevated filling pressure. Mitral Valve  Mild thickening of leaflets of mitral valve. No mitral stenosis. Moderate mitral regurgitation. Left Atrium  The left atrium is normal in size. Aortic Valve  Aortic valve appears sclerotic but opens adequately. Mild aortic regurgitation. Aorta  The aortic root is normal in size. Right Ventricle  Normal right ventricular size. Right ventricular systolic function is moderately reduced . TAPSE is measured at 11 mm. S' prime velocity is measured at 7 cm/s. Tricuspid Valve  Tricuspid valve is structurally normal.  Mild tricuspid regurgitation. Systolic pulmonic artery pressure (SPAP) is estimated at 48 mmHg consistent  with mild pulmonary hypertension (Right atrial pressure of 8 mmHg). Right Atrium  The right atrium is normal in size at 13 cm2. Pulmonic Valve  The pulmonic valve is not well visualized. Mild pulmonic regurgitation present.    Pericardial Effusion  Trivial pericardial effusion noted. Pleural Effusion  No pleural effusion. Miscellaneous  IVC size is normal (<2.1 cm) but collapses < 50% with respiration consistent  with elevated RA pressure (8 mmHg). M-Mode/2D Measurements (cm)   LV Diastolic Dimension: 2.67 cm LV Systolic Dimension: 1.78 cm  LV Septum Diastolic: 1.07 cm  LV PW Diastolic: 2.14 cm        AO Root Dimension: 3.1 cm                                  AV Cusp Separation: 2 cm                                  LA Dimension: 3.8 cm                                  LA Area: 19 cm2                                  LA volume/Index: 55.3 ml /31 ml/m2  Doppler Measurements   AV Peak Velocity: 84 cm/s      MV Peak E-Wave: 77.1 cm/s  AV Peak Gradient: 2.82 mmHg    MV Peak A-Wave: 96.4 cm/s                                 MV E/A Ratio: 0.8   TR Velocity:316 cm/s           MV Max P mmHg  TR Gradient:39.94 mmHg         MV Vmax:521 cm/s  Estimated RAP:8 mmHg  Estimated RVSP: 48 mmHg  E' Septal Velocity: 3.59 cm/s  E' Lateral Velocity: 8.05 cm/s  E/E' ratio: 15  PV Peak Velocity: 58.4 cm/s  PV Peak Gradient: 1.36 mmHg   Aortic Valve   Peak Velocity: 84 cm/s  Peak Gradient: 2.82 mmHg   AI P1/2t: 582 msec  Cusp Separation: 2 cm  Aorta   Aortic Root: 3.1 cm      CT HEAD WO CONTRAST    Result Date: 2021  EXAMINATION: CT OF THE HEAD WITHOUT CONTRAST  2021 2:20 pm TECHNIQUE: CT of the head was performed without the administration of intravenous contrast. Dose modulation, iterative reconstruction, and/or weight based adjustment of the mA/kV was utilized to reduce the radiation dose to as low as reasonably achievable. COMPARISON: None. HISTORY: ORDERING SYSTEM PROVIDED HISTORY: ams TECHNOLOGIST PROVIDED HISTORY: Reason for exam:->ams Has a \"code stroke\" or \"stroke alert\" been called? ->No Decision Support Exception - unselect if not a suspected or confirmed emergency medical condition->Emergency Medical Condition (MA) Reason for Exam: ams; pt sts he doesnt know why hes here; Acuity: Acute Type of Exam: Initial Relevant Medical/Surgical History: cad; high bp; helical due to anlge FINDINGS: BRAIN/VENTRICLES: There is no acute intracranial hemorrhage, mass effect or midline shift. No abnormal extra-axial fluid collection. The gray-white differentiation is maintained without evidence of an acute infarct. There is no evidence of hydrocephalus. There are chronic atrophic changes which are mildly advanced for the patient's age. There is patchy low-attenuation in the periventricular white matter most consistent with chronic small vessel ischemia. ORBITS: The visualized portion of the orbits demonstrate no acute abnormality. SINUSES: There is a small amount of fluid in the right mastoid air cells SOFT TISSUES/SKULL:  No acute abnormality of the visualized skull or soft tissues. No acute intracranial abnormality. Small right mastoid effusion     VL Extremity Venous Left    Result Date: 10/1/2021  Lower Extremities DVT Study  Demographics   Patient Name       Berlin Benito   Date of Study      10/01/2021        Gender              Male   Patient Number     [id number]        Date of Birth       1945   Visit Number       [id number]         Age                 78 year(s)   Accession Number   [id number]        Room Number         4467   Corporate ID       [id number]          Sonographer         Katty Pack RDMS T   Ordering Physician Barbie [place]  Interpreting        [facility]                                       Physician           Chloe Rocha MD  Procedure Type of Study:   Veins:Lower Extremities DVT Study, VL EXTREMITY VENOUS DUPLEX LEFT. Vascular Sonographer Report  Indications for Study:Edema.  Additional Indications:Patient is a poor historian; left leg swelling noted by MD. Venous Duplex Scan: B-mode imaging of the deep and superficial veins, with compression maneuvers, including color and Doppler spectral waveform analysis. Impressions Right Impression No evidence of deep vein thrombosis involving the right common femoral vein. Left Impression No evidence of deep vein or superficial vein thrombosis involving the left lower extremity. Evidence of a cystic structure seen in the popliteal space of the left leg measuring 3.8 x 1 cm. Conclusions   Summary   No evidence of deep vein or superficial thrombosis involving the left lower  extremity and the contralateral proximal common femoral vein. Left Baker's cyst.   Signature   ------------------------------------------------------------------  Electronically signed by Donalee Gilford, MD (Interpreting  physician) on 10/01/2021 at 04:34 PM  ------------------------------------------------------------------  Patient Status:Routine. Study [facility] [address] - Vascular Lab. Technical Quality:Adequate visualization. Risk Factors   - The patient's risk factor(s) include: diabetes mellitus, dyslipidemia and     arterial hypertension. Velocities are measured in cm/s ; Diameters are measured in mm Right Lower Extremities DVT Study Measurements Right 2D Measurements +--------------+----------+---------------+----------+ ! Location      ! Visualized! Compressibility! Thrombosis! +--------------+----------+---------------+----------+ ! Common Femoral!Yes       ! Yes            ! None      ! +--------------+----------+---------------+----------+ Right Doppler Measurements +--------------+---------+------+------------+ ! Location      ! Signal   !Reflux! Reflux (sec)! +--------------+---------+------+------------+ ! Common Femoral!Pulsatile! No    !            ! +--------------+---------+------+------------+ Left Lower Extremities DVT Study Measurements Left 2D Measurements +------------------------+----------+---------------+----------+ ! Location                ! Visualized! Compressibility! Thrombosis! +------------------------+----------+---------------+----------+ ! Sapheno Femoral Junction! Yes       ! Yes            ! None      ! +------------------------+----------+---------------+----------+ ! GSV Thigh               ! Yes       ! Yes            ! None      ! +------------------------+----------+---------------+----------+ ! Common Femoral          !Yes       ! Yes            ! None      ! +------------------------+----------+---------------+----------+ ! Femoral                 !Yes       ! Yes            ! None      ! +------------------------+----------+---------------+----------+ ! Prox Femoral            !Yes       ! Yes            ! None      ! +------------------------+----------+---------------+----------+ ! Mid Femoral             !Yes       ! Yes            ! None      ! +------------------------+----------+---------------+----------+ ! Dist Femoral            !Yes       ! Yes            ! None      ! +------------------------+----------+---------------+----------+ ! Deep Femoral            !Yes       ! Yes            ! None      ! +------------------------+----------+---------------+----------+ ! Popliteal               !Yes       ! Yes            ! None      ! +------------------------+----------+---------------+----------+ ! GSV Below Knee          ! Yes       ! Yes            ! None      ! +------------------------+----------+---------------+----------+ ! Gastroc                 ! Yes       ! Yes            ! None      ! +------------------------+----------+---------------+----------+ ! Soleal                  !Yes       ! Yes            ! None      ! +------------------------+----------+---------------+----------+ ! PTV                     ! Yes       ! Yes            ! None      ! +------------------------+----------+---------------+----------+ ! Peroneal                !Yes       ! Yes            ! None      ! +------------------------+----------+---------------+----------+ ! GSV Calf                ! Yes       ! Yes            ! None      ! +------------------------+----------+---------------+----------+ ! SSV                     ! Yes       ! Yes            ! None      ! +------------------------+----------+---------------+----------+ Left Doppler Measurements +------------------------+---------+------+------------+ ! Location                ! Signal   !Reflux! Reflux (sec)! +------------------------+---------+------+------------+ ! Sapheno Femoral Junction! Pulsatile! No    !            ! +------------------------+---------+------+------------+ ! Common Femoral          !Pulsatile! No    !            ! +------------------------+---------+------+------------+ ! Femoral                 !Pulsatile! No    !            ! +------------------------+---------+------+------------+ ! Deep Femoral            !Pulsatile! No    !            ! +------------------------+---------+------+------------+ ! Popliteal               !Pulsatile! No    !            ! +------------------------+---------+------+------------+    XR CHEST PORTABLE    Result Date: 10/1/2021  EXAMINATION: ONE XRAY VIEW OF THE CHEST 10/1/2021 12:24 pm COMPARISON: None available. HISTORY: ORDERING SYSTEM PROVIDED HISTORY: to determine whether there are pacing wires for MRI TECHNOLOGIST PROVIDED HISTORY: Reason for exam:->to determine whether there are pacing wires for MRI Reason for Exam: to determine whether there are pacing wires for MRI Acuity: Acute Type of Exam: Initial FINDINGS: There is moderate pulmonary vascular congestion as well as a small to moderate right pleural effusion. The cardiac silhouette is enlarged status post median sternotomy and CABG. 2 wires are coiled overlying the left hemithorax. There is no pneumothorax. 1. Congestive heart failure.      US RETROPERITONEAL COMPLETE    Result Date: 10/1/2021  EXAMINATION: RETROPERITONEAL ULTRASOUND OF THE KIDNEYS AND URINARY BLADDER 10/1/2021 COMPARISON: None HISTORY: ORDERING SYSTEM PROVIDED HISTORY: ADRIAN on CKD; R/O LUTS or atrophy TECHNOLOGIST PROVIDED HISTORY: Reason for exam:->ADRIAN on CKD; R/O LUTS or atrophy 68-year-old female with acute on chronic kidney disease FINDINGS: Kidneys: Right kidney measures 8.2 x 4.3 x 3.8 cm. Right renal cortical thickness measures 1.4 cm. Left kidney measures 8.3 x 4.2 x 3.5 cm. Left renal cortical thickness measures 1 cm. No hydronephrosis or perinephric fluid. No echogenic foci with posterior acoustic shadowing to suggest renal calculi. Gross preservation of the bilateral corticomedullary differentiation. Bladder: Slightly filled urinary bladder. Grossly unremarkable ultrasound of the kidneys and urinary bladder. No hydronephrosis. Hospital Problems         Last Modified POA    * (Principal) Acute encephalopathy 9/30/2021 Yes    CAD (coronary artery disease) 9/30/2021 Yes    DM (diabetes mellitus), type 2 ([address] 9/30/2021 Yes    Hyperlipidemia 9/30/2021 Yes    Hypertension 9/30/2021 Yes    Acute renal failure superimposed on chronic kidney disease ([address] 10/1/2021 Yes    Acute UTI 9/30/2021 Yes    Elevated troponin 10/1/2021 Yes    Cardiomyopathy ([address] 10/1/2021 Yes    Normocytic anemia 10/1/2021 Yes              Assessment:   68-year-old male with coronary disease diabetes hyperlipidemia hypertension and urinary tract infection is admitted with confusion. Plan:   1. Agree with cardiology and would continue aspirin. 2. Monitor for evidence of gross bleeding. Unless having active bleeding would hold off on endoscopic evaluation  3. Continue ppi as outpatient for gastroprotection.       Wolf Payment, DO  7:12 PM 10/1/2021            [address] [facility]    [address]      [address] [facility]     Phone: [phone]     Fax: [phone]

## 2021-10-01 NOTE — PROGRESS NOTES
Physical Therapy    Facility/Department: [facility] B3 - MED SURG  Initial Assessment    NAME: Aiden Mckinney  : 1945  MRN: [id number]    Date of Service: 10/1/2021    Discharge Recommendations:  Subacute/Skilled Nursing Facility   PT Equipment Recommendations  Equipment Needed: No  If pt discharges prior to next PT session this note will serve as discharge summary. Assessment   Body structures, Functions, Activity limitations: Decreased functional mobility ; Decreased safe awareness;Decreased balance;Decreased endurance;Decreased strength;Decreased cognition  Assessment: 79 yo male adm with acute enceph, Metabolic acidosis with ADRIAN, UTI, possible mastoiditis, elevated troponin. He resides at [facility], is usually oriented, walked indep. Today pt functioning well below baseline with AM PAC mobility score of 11, confusion, disorientation. He will benefit from skilled PT to address deficits. REcommend SNF at discharge  Treatment Diagnosis: weakness, decreased mobility, decreased cognition  Specific instructions for Next Treatment: ex, gait  Prognosis: Good  Decision Making: Medium Complexity  PT Education: Goals;PT Role;Plan of Care;General Safety; Disease Specific Education;Gait Training;Transfer Training;Orientation  Patient Education: Disease Specific: pt educated in general safety, reorientation provided. Pt able to repeat back information but will need reinforcement  Barriers to Learning: confusion  REQUIRES PT FOLLOW UP: Yes  Activity Tolerance  Activity Tolerance: Patient Tolerated treatment well  Activity Tolerance: /76  HR 83  SpO2 98%       Patient Diagnosis(es): The primary encounter diagnosis was Acute renal failure superimposed on chronic kidney disease, unspecified CKD stage, unspecified acute renal failure type ([facility] [address] Diagnoses of Urinary tract infection without hematuria, site unspecified and Altered mental status, unspecified altered mental status type were also pertinent to this visit.      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 has a past surgical history that includes Cardiac surgery (2006); Lung surgery (2006); Kidney stone surgery (1996); Eye surgery (Bilateral, 2003); and Colonoscopy. Restrictions  Restrictions/Precautions: Up as Tolerated, General Precautions, Fall Risk  Position Activity Restriction  Other position/activity restrictions: Avasys monitor in room  Vision/Hearing  Vision:  (pt states he wears glasses, none found in room)  Hearing: Within functional limits     Subjective  Chart Reviewed: Yes  Patient assessed for rehabilitation services?: Yes  Family / Caregiver Present: No  Referring Practitioner: Hao Tse DO  Referral Date : 09/30/21  Diagnosis: acute encephalopathy, metabolic acidosis with ADRIAN, UTI, possible mastoiditis, elevated troponin. Follows Commands: Impaired (follows only simple familiar commands)  Comments: RN cleared pt for therapy, pt resting in bed on approach, Avasys monitor in room  Subjective: Pt agrees to therapy  Pain Screening  Patient Currently in Pain: Denies  Vital Signs- see activity tolerance  Patient Currently in Pain: Denies       Orientation  Overall Orientation Status: Impaired  Orientation Level: Oriented to person;Disoriented to time;Oriented to place; Disoriented to situation  Social/Functional History  Social/Functional History  Type of Home: [facility]  Additional Comments: Mariluz Cunningham CNP report pt lives at [facility], at baseline is indep ambulating and performing ADLs, and is typically fully oriented    Objective     RLE AROM: WFL  LLE AROM : WFL  Strength : BLEs: pt unable to follow commands for MMT.  He needs assist with LEs to get out of bed, he is able to bear wt on legs for transfers and ambulation        Bed mobility  Rolling to Right: Moderate assistance  Supine to Sit: Moderate assistance  Transfers  Sit to Stand: Moderate Assistance;2 Person Assistance  Stand to sit: Moderate Assistance;2 Person Assistance  Ambulation    Device: Hand-Held Assist  Assistance: Moderate assistance;2 Person assistance  Quality of Gait: Short shuffling steps with posterior lean, assist for balance and cues to keep moving feet, difficulty on turns and when backing up to toilet or bed  Distance: 12 ft x2     Balance: Strong posterior lean in standing  Exercises: Exercise omitted due to difficulty following instructions     Plan   Times per week: 3-5 x wk  Specific instructions for Next Treatment: ex, gait  Current Treatment Recommendations: Strengthening, Transfer Training, Endurance Training, Cognitive Reorientation, Balance Training, Gait Training, Functional Mobility Training, Safety Education & Training  Safety Devices  Type of devices:  All fall risk precautions in place, Gait belt, Bed alarm in place, Patient at risk for falls, Nurse notified, Call light within reach, Left in bed, Telesitter in use    AM-PAC Score  AM-PAC Inpatient Mobility Raw Score : 11 (10/01/21 1020)  AM-PAC Inpatient T-Scale Score : 33.86 (10/01/21 1020)  Mobility Inpatient CMS 0-100% Score: 72.57 (10/01/21 1020)  Mobility Inpatient CMS G-Code Modifier : CL (10/01/21 1020)          Goals  Short term goals  Time Frame for Short term goals: 1 week (10/7) unless otherwise specified  Short term goal 1: pt to perform bed mob Supervised  Short term goal 2: pt to perform transfers CG  Short term goal 3: pt to amb with least restrictive or no device 50 ft CG  Short term goal 4: pt to participate in LE Ex 8-10 reps by 10/5  Patient Goals   Patient goals : Pt is confused, does not state goals       Therapy Time   Individual Concurrent Group Co-treatment   Time In 0925         Time Out 0958         Minutes 33         Timed Code Treatment Minutes: [address]

## 2021-10-01 NOTE — PROGRESS NOTES
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 has a past surgical history that includes Cardiac surgery (2006); Lung surgery (2006); Kidney stone surgery (1996); Eye surgery (Bilateral, 2003); and Colonoscopy. Treatment Diagnosis: AMS      Restrictions  Restrictions/Precautions  Restrictions/Precautions: Up as Tolerated, General Precautions, Fall Risk  Position Activity Restriction  Other position/activity restrictions: Avasys monitor in room    Subjective   General  Patient assessed for rehabilitation services?: Yes  Patient Currently in Pain: Denies  Vital Signs  Temp: 98.1 °F (36.7 °C)  Temp Source: Oral  Pulse: 83  Heart Rate Source: Monitor  Resp: 18  BP: (!) 159/76  BP Location: Right upper arm  Patient Currently in Pain: Denies  Oxygen Therapy  SpO2: 98 %  O2 Device: None (Room air)  Social/Functional History  Social/Functional History  Type of Home: [facility]  Additional Comments: Jean Dozier CNP report pt lives at [facility], at baseline is indep ambulating and performing ADLs, and is typically fully oriented       Objective        Orientation  Overall Orientation Status: Impaired  Orientation Level: Disoriented to place; Disoriented to time;Disoriented to situation;Disoriented to person;Disoriented X4     Balance  Sitting Balance: Contact guard assistance  Standing Balance: Moderate assistance (Mod A x2)  Standing Balance  Time: 4 min  Activity: functional mobility, ADL completion  Functional Mobility  Functional - Mobility Device: No device ([place] assist)  Activity: To/from bathroom  Assist Level: Moderate assistance (Mod A x2)  Functional Mobility Comments:  Mod A x 2  Toilet Transfers  Toilet - Technique: Ambulating  Equipment Used: Standard toilet  Toilet Transfer: 2 Person not assess 2/2 cognition, pt appears weak.                    Plan   Plan  Times per week: 3-5x  Specific instructions for Next Treatment: cotx  Current Treatment Recommendations: Balance Training, Functional Mobility Training, Safety Education & Training, Cognitive Reorientation, Self-Care / ADL, Patient/Caregiver Education & Training, Home Management Training, Endurance Training      AM-PAC Score        AM-PAC Inpatient Daily Activity Raw Score: 13 (10/01/21 1053)  AM-PAC Inpatient ADL T-Scale Score : 32.03 (10/01/21 1053)  ADL Inpatient CMS 0-100% Score: 63.03 (10/01/21 1053)  ADL Inpatient CMS G-Code Modifier : CL (10/01/21 1053)    Goals  Short term goals  Time Frame for Short term goals: 1 week by 10/8  Short term goal 1: Pt will complete toileting with Mod A by 10/4  Short term goal 2: Pt will complete LBD with Mod  A  Short term goal 3: Pt will complete grooming seated EOB with Min A  Short term goal 4: Pt will complete UBD seated EOB with Min A  Long term goals  Time Frame for Long term goals : STGs=LTGs  Patient Goals   Patient goals : unable to state       Therapy Time   Individual Concurrent Group Co-treatment   Time In 0925         Time Out 0958         Minutes 33              Timed Code Treatment Minutes:  23 Minutes 10 Min eval     Total Treatment Minutes:  35 min       Raquel Skaggs OT

## 2021-10-01 NOTE — H&P
HOSPITALISTS HISTORY AND PHYSICAL    9/30/2021 9:39 PM    Patient Information:  Krystin Klein is a 78 y.o. male [id number]  PCP:  Vinod Medrano (Tel: [phone] )    Chief complaint:    Chief Complaint   Patient presents with    Altered Mental Status     Pt arrived [place], mental status change over the past 2 days. Pt is normally alert and oriented per EMS report. Pt now implusive alert only to self. Pt was found to have moderate blood today from rectum.         History of Present Illness:  Cecile Grossman is a 78 y.o. male who presented to the ED from [address] [facility] where he currently resides, to be evaluated for altered mental status. Caretakers report that he is typically alert and oriented, but for the past 2 days has been noted to be acutely confused and withdrawn. There is also report the patient displayed BRBPR PTA. History is limited due to acute confusional state. Patient is currently without complaints. Upon arrival to the ED EKG was obtained revealing NSR with ST and T wave abnormalities concerning for possible inferolateral ischemia; QTC prolongation noted. Head CT revealed no acute intracranial abnormality; small right mastoid effusion present. Initial labs were notable for metabolic acidosis with ADRIAN superimposed on CKD, CrCl 18 cc/min. Other abnormalities include troponin elevation 0.10, hypoalbuminemia, hyperglycemia 151. Labs obtained by admitting hospitalist revealed elevated procalcitonin 0.22, ammonia level normal at 12, and lactate 0.9.  UA suggestive of infection therefore sent for culture. Blood cultures x2 ordered STAT by admitting physician, and patient initiated on antibiotics to treat both UTI and potential mastoiditis.       History obtained from patient and review of [facility] chart    REVIEW OF SYSTEMS:   Constitutional: Positive generalized weakness; negative for fever,chills  ENT: Negative for headache, rhinorrhea, and sore throat. Respiratory: Negative shortness of breath, wheezing, and cough  Cardiovascular: Negative for chest pain, palpitations, peripheral edema, orthopnea or PND  Gastrointestinal: Positive BRBPR; negative for N/V/D and abdominal pain; no hematemesis  Genitourinary: Denies dysuria, frequency, retention; no incontinence  Hematologic/Lymphatic: Negative for bleeding tendency/excessive bruising  Musculoskeletal: Positive for chronic myalgias and arthalgias; able to ambulate without difficulty  Neurologic: Negative for LOC, seizure activity, paresthesias, dysarthria, vertigo, and gait disturbance  Skin: Negative for itching,rash, decubitus  Psychiatric: Positive for acute confusion and blunted affect; negative for depression,anxiety, and agitation; no hallucinations; denies SI/HI  Endocrine: Negative for polyuria/polydipsia/polyphagia; no heat/cold intolerance    Past Medical History:   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 Past Surgical History:   has a past surgical history that includes Cardiac surgery (2006); Lung surgery (2006); Kidney stone surgery (1996); Eye surgery (Bilateral, 2003); and Colonoscopy. Medications:  No current facility-administered medications on file prior to encounter.      Current Outpatient Medications on File Prior to Encounter   Medication Sig Dispense Refill    glipiZIDE (GLUCOTROL) 10 MG tablet TAKE ONE TABLET BY MOUTH TWICE A DAY BEFORE MEALS 180 tablet 2    simvastatin (ZOCOR) 40 MG tablet TAKE ONE TABLET BY MOUTH ONCE NIGHTLY 90 tablet 2    insulin detemir (LEVEMIR FLEXTOUCH) 100 UNIT/ML injection pen Inject 14 Units into the skin nightly 5 Pen 6    torsemide (DEMADEX) 20 MG tablet Take 1/2---1  qd 90 tablet 3    aspirin 81 MG tablet Take 81 mg by mouth daily      glucose blood VI test strips (ACCU-CHEK ROBERT) STRP 1 Container by In Vitro route daily 100 strip 3    Insulin Pen Needle 31G X 8 MM MISC 1 each by Does not apply route daily. 100 each 3    Ascorbic Acid (VITAMIN C) 250 MG tablet Take 500 mg by mouth daily.  Cholecalciferol (VITAMIN D3) 2000 UNITS CAPS Take  by mouth daily.  carvedilol (COREG) 25 MG tablet Take 25 mg by mouth 2 times daily (with meals).  Iron TABS Take 25 mg by mouth.  Omega-3 Fatty Acids (OMEGA 3 PO) Take 1,200 mg by mouth 3 times daily. Allergies: Allergies   Allergen Reactions    Ace Inhibitors      And arbs  cough    Morphine         Social History:   reports that he has never smoked. He has never used smokeless tobacco. He reports that he does not drink alcohol and does not use drugs. Family History:  family history includes Diabetes in his mother.      Physical Exam:  BP (!) 152/82   Pulse 93   Temp 98.1 °F (36.7 °C) (Oral)   Resp 20   Ht 5' 8\" (1.727 m)   Wt 150 lb (68 kg)   SpO2 98%   BMI 22.81 kg/m²     General appearance: Elderly male resting comfortably in bed; NAD; cooperative  Eyes: Sclera clear without conjunctival injection; PERRLA; EOMI  ENT: Mucous membranes moist without thrush; normal dentition  Neck: Supple without meningismus; no goiter; no carotid bruit bilaterally  Cardiovascular: Regular rhythm without ectopy; normal S1-S2 with no murmurs; no peripheral edema; no JVD  Respiratory: No tachypnea; CTAB with adequate air exchange, no wheeze, rhonchi or rales; I:E intact  Gastrointestinal: Abdomen soft, non-tender, not distended; bowel sounds normal; no masses/organomegaly appreciated; positive melanotic stool at rectum  Musculoskeletal: FROM spine and extremities x4; no gross deformity  Neurology: A&O x3; cranial nerves 2-12 grossly intact; motor 5/5  BUE/BLE; no seizure activity; finger-to-nose/heel-to-shin intact; no pronator drift  Psychiatry: Alert times person only; well-groomed with good eye contact; blunted affect; no visual/auditory hallucination  Skin: Warm, dry, normal turgor, no rash  PV: 2/4 radial and dorsalis pedis bilaterally; brisk capillary refill    Labs:  CBC:   Lab Results   Component Value Date    WBC 4.7 09/30/2021    RBC 2.85 09/30/2021    HGB 9.3 09/30/2021    HCT 28.1 09/30/2021    MCV 98.8 09/30/2021    MCH 32.7 09/30/2021    MCHC 33.1 09/30/2021    RDW 15.0 09/30/2021     09/30/2021    MPV 8.7 09/30/2021     BMP:    Lab Results   Component Value Date     09/30/2021    K 4.4 09/30/2021     09/30/2021    CO2 20 09/30/2021    BUN 50 09/30/2021    CREATININE 3.4 09/30/2021    CALCIUM 8.8 09/30/2021    GFRAA 22 09/30/2021    LABGLOM 18 09/30/2021    GLUCOSE 151 09/30/2021     CT HEAD WO CONTRAST   Final Result   No acute intracranial abnormality. Small right mastoid effusion               EKG: Ventricular Rate 80 P BPM QTc Calculation (Bazett) 465 P ms   Atrial Rate 80 P BPM P Axis 30 P degrees   P-R Interval 186 P ms R Axis -28 P degrees   QRS Duration 100 P ms T Axis 171 P degrees   Q-T Interval 404 P ms Diagnosis Normal sinus rhythmST & T wave abnormality, consider inferolateral ischemiaProlonged QTAbnormal        I visualized CXR images and EKG strips personally and agree with documented interpretation    Discussed case  with ED provider    Problem List  Principal Problem:    Acute encephalopathy  Active Problems:    Acute kidney injury superimposed on CKD ([facility] [address]    Acute UTI    CAD (coronary artery disease)    DM (diabetes mellitus), type 2 ([facility] [address]    Hyperlipidemia    Hypertension  Resolved Problems:    * No resolved hospital problems.  *        Assessment/Plan:     Acute encephalopathy  -Admit to telemetry with Q4H neuro checks scheduled overnight  -Aspiration precautions and fall protocol in place  -Thyroid studies, lactate, ammonia level, diet    DVT prophylaxis-BLE SCD due to BRBPR  Code status-full code  Diet - NPO after midnight  IV access-PIV established in ED      Admit as inpatient. I anticipate hospitalization spanning more than two midnights for investigation and treatment of the above medically necessary diagnoses. Please note that some part of this chart was generated using Dragon dictation software. Although every effort was made to ensure the accuracy of this automated transcription, some errors in transcription may have occurred inadvertently. If you may need any clarification, please do not hesitate to contact me through [facility].        Shiva Coleman MD    9/30/2021 9:39 PM

## 2021-10-01 NOTE — PROGRESS NOTES
Hospitalist Progress Note      PCP: Jack Garcia    Date of Admission: 9/30/2021    Chief Complaint: [address] Course: Omar Mccartney is a 78 y.o. male who presented to the ED from [facility] where he currently resides, to be evaluated for altered mental status. Caretakers report that he is typically alert and oriented, but for the past 2 days has been noted to be acutely confused and withdrawn. There is also report the patient displayed BRBPR PTA. History is limited due to acute confusional state. Patient is currently without complaints.     Upon arrival to the ED EKG was obtained revealing NSR with ST and T wave abnormalities concerning for possible inferolateral ischemia; QTC prolongation noted. Head CT revealed no acute intracranial abnormality; small right mastoid effusion present. Initial labs were notable for metabolic acidosis with ADRIAN superimposed on CKD, CrCl 18 cc/min. Other abnormalities include troponin elevation 0.10, hypoalbuminemia, hyperglycemia 151. Labs obtained by admitting hospitalist revealed elevated procalcitonin 0.22, ammonia level normal at 12, and lactate 0.9.  UA suggestive of infection therefore sent for culture. Blood cultures x2 ordered STAT by admitting physician, and patient initiated on antibiotics to treat both UTI and potential mastoiditis.     Subjective: NAD, confused, discussed with son at bedside      Medications:  Reviewed    Infusion Medications    sodium chloride 75 mL/hr at 10/01/21 0001    sodium chloride      dextrose       Scheduled Medications    pantoprazole  40 mg IntraVENous BID    linezolid  600 mg IntraVENous Q12H    cefTRIAXone (ROCEPHIN) IV  1,000 mg IntraVENous Q24H    carvedilol  25 mg Oral BID     insulin glargine  14 Units SubCUTAneous Nightly    atorvastatin  20 mg Oral Daily    [Held by provider] aspirin  81 mg Oral Daily    sodium chloride flush  10 mL IntraVENous 2 times per day    insulin lispro 0.4 0.3   ALKPHOS 75 71     No results for input(s): INR in the last 72 hours. Recent Labs     09/30/21  1437 09/30/21  2253 10/01/21  0623   TROPONINI 0.10* 0.09* 0.08*       Urinalysis:      Lab Results   Component Value Date    NITRU Negative 09/30/2021    WBCUA 21-50 09/30/2021    BACTERIA 3+ 09/30/2021    RBCUA 5-10 09/30/2021    BLOODU SMALL 09/30/2021    SPECGRAV 1.015 09/30/2021    GLUCOSEU Negative 09/30/2021       Radiology:  CT HEAD WO CONTRAST   Final Result   No acute intracranial abnormality.       Small right mastoid effusion         US RETROPERITONEAL COMPLETE    (Results Pending)   MRI BRAIN W WO CONTRAST    (Results Pending)           Assessment/Plan:    Active Hospital Problems    Diagnosis     Acute kidney injury superimposed on CKD ([facility] [address] [N17.9, N18.9]     Acute UTI [N39.0]     Acute encephalopathy [G93.40]     DM (diabetes mellitus), type 2 ([facility] [address] [E11.9]     Hypertension [I10]     Hyperlipidemia [E78.5]     CAD (coronary artery disease) [I25.10]      Acute encephalopathy  -Admit to telemetry with Q4H neuro checks scheduled overnight  -Aspiration precautions and fall protocol in place  -Thyroid studies, lactate, ammonia level, B12/folate pending  -MRI of the head with and without contrast scheduled for a.m  -PT/OT/SLP consultation placed  -NEURO consult placed for further recommendations     Acute UTI/ possible mastoiditis  -Patient admitted to telemetry floor with appropriate isolation measures in place  -Crystalloid IVF resuscitation initiated in ED, with maintenance fluids to infuse overnight; strict I's and O's  -Blood, urine cultures ordered STAT  -Patient initiated on IV Zyvox (positive ADRIAN) and cefepime empirically  -Serial CBC, CMP, lactate, procalcitonin, D-dimer to monitor treatment progression     ADRIAN on CKD  -Patient admitted to telemetry floor with strict I's and O's during stay  -Patient aggressively hydrated in ED with NS, with maintenance fluids continued overnight  -Straight cath for urinary retention greater than 300 cc  -Urine spot protein, microalbumin, FENa studies pending  -Complete renal ultrasound to assess for renal atrophy/LUTS-stable  -Nephrotoxic medications held, including home dosages of ACE-RI, diuretics, and NSAIDs  -Consultation placed to [address] for further recommendations     BRBPR with anemia  -Admit to telemetry for continuous cardiac monitoring  -Type and screen performed with plans for PRBC transfusion for Hgb < 7  -Protonix 40 mg IV initiated BID  -Consultation placed to GI for possible endoscopy and further recommendations  -Strict NPO after midnight     CAD with elevated trop  -Admit to telemetry floor with serial cardiac enzymes to be obtained overnight  -ECHO scheduled for a.m.  -Continue Coreg and Zocor; ASA held due to GIB  -Consultation placed to cardiologist for further recommendations     Type 2 DM  -A1c ordered with results pending at time of dictation  -Home hypoglycemic medications placed on temporary hold  -Continue home dose Levemir  -Humalog medium dose SSI scheduled before meals and at bedtime based on POC glucose  -Carbohydrate restriction placed on diet    DVT Prophylaxis: scds  Diet: Diet NPO  Code Status: Full Code    PT/OT Eval Status: consulted    Dispo - pending course    DANGELO Lim - CNP

## 2021-10-01 NOTE — CONSULTS
Consult placed    Who:RAS JOINER with GI  Date:10/1/2021,  Time:10:26 AM        Electronically signed by Ute Ryder on 10/1/2021 at 10:26 AM

## 2021-10-01 NOTE — ED NOTES
Pt resting on cot. Pt very confused at this time. Pt believes he is in his car and is waiting for the police to give him a ticket. Attempt without success to reorient pt. Bed rails up X2. Bed alarm on. Door open. Will continue to monitor.        Carmen Pereira RN  09/30/21 1090

## 2021-10-01 NOTE — CONSULTS
In patient Neurology consult        [facility] Neurology      MD Vaishali Banerjeee Kate  1945    Date of Service: 10/1/2021    Referring Physician: Martin Genao MD    Most of the history was obtained from detailed chart reviewing and discussion with the patient's nurse. The patient is currently confused and unable to provide me with accurate history. Reason for the consult and CC: Acute encephalopathy    HPI:   The patient is a 78 y.o.  male, with a past medical history of CAD, hyperlipidemia, hypertension, type 2 diabetes, who presented to [facility] with acute confusion. He was noted to be acutely withdrawn and confused for the previous 2 days at his nursing facility. Upon presentation to the emergency room, he was found to have an elevated troponin, abnormal EKG, and possible UTI. At the time of my exam, he is awake alert and pleasant. He denies fever, chills, headache, dizziness, dysarthria, dysphagia, focal weakness, chest pain, shortness of breath. He is able to follow simple commands.     Family History   Problem Relation Age of Onset    Diabetes Mother          Past Medical History:   Diagnosis Date    CAD (coronary artery disease)     Chronic renal disease     Dr. 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 ([facility] [address]     Type II or unspecified type diabetes mellitus without mention of complication, not stated as uncontrolled      Past Surgical History:   Procedure Laterality Date    CARDIAC SURGERY  2006    quad by pass    COLONOSCOPY      pt refuses to have one    EYE SURGERY Bilateral 2003    [address]    LUNG SURGERY  2006    scraped lungs     Social History     Tobacco Use    Smoking status: Never Smoker    Smokeless tobacco: Never Used   Substance Use Topics    Alcohol use: No    Drug use: No     Allergies   Allergen Reactions    Ace Inhibitors      And arbs  cough    Morphine      Current Facility-Administered Medications Medication Dose Route Frequency Provider Last Rate Last Admin    pantoprazole (PROTONIX) injection 40 mg  40 mg IntraVENous BID Shiva Coleman MD        linezolid (ZYVOX) IVPB 600 mg  600 mg IntraVENous Q12H Shiva Coleman MD   Stopped at 10/01/21 0223    cefTRIAXone (ROCEPHIN) 1000 mg IVPB in 50 mL D5W minibag  1,000 mg IntraVENous Q24H Shiva Coleman MD   Stopped at 10/01/21 0030    carvedilol (COREG) tablet 25 mg  25 mg Oral BID SOCRATES Coleman MD        insulin glargine (LANTUS) injection vial 14 Units  14 Units SubCUTAneous Nightly Shiva Coleman MD   14 Units at 10/01/21 0047    atorvastatin (LIPITOR) tablet 20 mg  20 mg Oral Daily Shiva Coleman MD        [Held by provider] aspirin EC tablet 81 mg  81 mg Oral Daily Shiva Coleman MD        0.9 % sodium chloride infusion   IntraVENous Continuous Shiva Coleman MD 75 mL/hr at 10/01/21 0001 New Bag at 10/01/21 0001    sodium chloride flush 0.9 % injection 10 mL  10 mL IntraVENous 2 times per day Shiva Coleman MD        sodium chloride flush 0.9 % injection 10 mL  10 mL IntraVENous PRN Shiva Coleman MD        0.9 % sodium chloride infusion  25 mL IntraVENous PRN Shiva Coleman MD        magnesium sulfate 2000 mg in 50 mL IVPB premix  2,000 mg IntraVENous PRN Shiva Coleman MD        promethazine (PHENERGAN) tablet 12.5 mg  12.5 mg Oral Q6H PRN Shiva Coleman MD        Or    ondansetron [facility]) injection 4 mg  4 mg IntraVENous Q6H PRN Shiva Coleman MD        senna (SENOKOT) tablet 8.6 mg  1 tablet Oral Daily PRN Shiva Coleman MD        perflutren lipid microspheres (DEFINITY) injection 1.65 mg  1.5 mL IntraVENous ONCE PRN Shiva Coleman MD        insulin lispro (HUMALOG) injection vial 0-12 Units  0-12 Units SubCUTAneous TID SOCRATES Coleman MD        insulin lispro (HUMALOG) injection vial 0-6 Units  0-6 Units SubCUTAneous Nightly Shiva Coleman MD        glucose (GLUTOSE) 40 % oral gel 15 g  15 g Oral PRN Radha A Nahomi Selby MD        dextrose 50 % IV solution  12.5 g IntraVENous PRN Geovanny Bar MD        glucagon (rDNA) injection 1 mg  1 mg IntraMUSCular PRN Geovanny Bar MD        dextrose 5 % solution  100 mL/hr IntraVENous PRN Geovanny Bar MD        Vitamin D (CHOLECALCIFEROL) tablet 2,000 Units  2,000 Units Oral Daily Geovanny Bar MD           ROS: 10-14 system review was limited due to MS changes or as per HPI. Constitutional:   Vitals:    09/30/21 2033 09/30/21 2156 09/30/21 2215 10/01/21 0345   BP: (!) 152/82 (!) 144/79 (!) 142/67 138/71   Pulse: 93 87 83 84   Resp: 20 20 15 16   Temp:   98 °F (36.7 °C) 97.9 °F (36.6 °C)   TempSrc:   Oral Axillary   SpO2: 98% 96% 95% 98%   Weight:       Height:           General appearance: Confused   Eye: Fundus of the eye: Optic disc is difficult to obtain due to poor cooperation from the patient  Neck: supple  Cardiovascular: No lower leg edema with good pulsation. Mental Status:   AAO times one  Poor attention and concentration. Waxing and waning. Unable to assess recent or remote memory due to confusion  Language: Fluent but with poor comprehension and not following directions  Unable to assess fund of knowledge due to confusion. Cranial Nerves:   II:  Pupils: equal, round, reactive to light  III,IV,VI: Extra Ocular Movements are intact. No nystagmus  V: Facial sensation : not tested due to confusion  VII: Facial strength and movements: intact and symmetric  VIII: Hearing: can track my voice  IX: Palate elevation: NT due to confusion  XI: Shoulder shrug: NT due to confusion  XII: Tongue movements: midline  Musculoskeletal:  The patient can move all 4 extremities. No apparent focal weakness. Tone: Normal tone. No rigidity. Reflexes: Symmetric 2+ in both arms legs. Planters: flexor bilaterally. Coordination: No abnormal movement  Sensation: Patient can withdraw to pain from left and right   Gait/Posture: Did not test gait.     Data:  LABS:   Lab Results   Component Value Date     10/01/2021    K 4.6 10/01/2021     10/01/2021    CO2 18 10/01/2021    BUN 44 10/01/2021    CREATININE 3.0 10/01/2021    GFRAA 25 10/01/2021    LABGLOM 21 10/01/2021    GLUCOSE 133 10/01/2021    CALCIUM 8.5 10/01/2021     Lab Results   Component Value Date    WBC 4.8 10/01/2021    RBC 2.78 10/01/2021    HGB 9.0 10/01/2021    HCT 26.7 10/01/2021    MCV 95.8 10/01/2021    RDW 14.9 10/01/2021     10/01/2021   No results found for: INR, PROTIME    Neuroimaging were independently reviewed by me. Reviewed notes from different physicians  Reviewed lab and blood testing    Impression:     Acute encephalopathy - likely metabolic due to underlying infection. Will rule out central etiology with MRI brain. ADRIAN on CKD  Possible GIB  Acute cystitis    Recommendation:     MRI of brain. Monitor on telemetry. Supportive care. Avoid sedatives and narcotics. Resume aspirin when okay with GI. Continue statin. Continue abx for UTI. Glycemic control. F/u A1c, B12, folate. Follow Cr levels. Thank you for referring such patient. If you have any questions regarding my consult note, please don't hesitate to call me. Jean Dozier CNP    This dictation was generated by voice recognition computer software. Although all attempts are made to edit the dictation for accuracy, there may be errors in the  transcription that are not intended        Attending Supervising Physician's Attestation Statement   I reviewed and agree with the findings and plan as documented in NP consult note   Patient admitted for acute encephalopathy superimposed on dementia  Awaiting metabolic work-up for possible embolic encephalopathy.   Agree with MRI to exclude possibility of new ischemic stroke      Electronically signed by Darryle Columbus, MD on 10/1/21 at 12:05 PM EDT

## 2021-10-01 NOTE — PLAN OF CARE
Problem: Falls - Risk of:  Goal: Will remain free from falls  Description: Will remain free from falls  Outcome: Ongoing  Goal: Absence of physical injury  Description: Absence of physical injury  Outcome: Ongoing     Problem: Confusion - Acute:  Goal: Absence of continued neurological deterioration signs and symptoms  Description: Absence of continued neurological deterioration signs and symptoms  Outcome: Ongoing     Problem: Injury - Risk of, Physical Injury:  Goal: Will remain free from falls  Description: Will remain free from falls  Outcome: Ongoing  Goal: Absence of physical injury  Description: Absence of physical injury  Outcome: Ongoing     Problem: Mood - Altered:  Goal: Mood stable  Description: Mood stable  Outcome: Ongoing  Goal: Absence of abusive behavior  Description: Absence of abusive behavior  Outcome: Ongoing

## 2021-10-01 NOTE — CARE COORDINATION
CASE MANAGEMENT INITIAL ASSESSMENT      Reviewed chart and completed assessment via telephone with:sonAbi  Explained Case Management role/services. Primary contact information:     Health Care Decision Maker :   Primary Decision Maker: Anupama Mcgarry - [phone]          Can this person be reached and be able to respond quickly, such as within a few minutes or hours? Yes  Who would be your back-up decision maker? Name    Phone Number:     Admit date/status:9/30/21 Inpatient  Diagnosis:Encephalopathy, UTI   Is this a Readmission?:  No      Insurance:BCBS Medicare   Precert required for SNF: Yes       3 night stay required: No    Living arrangements, Adls, care needs, prior to admission:LTC @ [employer]: [address] at home:  Walker__Cane__RTS__ BSC__Shower Chair__  02__ HHN__ CPAP__  BiPap__  Hospital Bed__ W/C___ Other__________    Services in the home and/or outpatient, prior to admission: LTC    Dialysis Facility (if applicable)   · Name:  · Address:  · Dialysis Schedule:  · Phone:  · Fax:    PT/OT recs:SNF    Hospital Exemption Notification (HEN): If SNF needed;    Barriers to discharge: medical stability    Plan/comments: Zack spoke with sonAbi. Pt is [facility] term care at [facility]. Plan is to return at [place]. Pt will need transport back to facility. Per son, pt has mentally declined fairly rapidly. Pt's spouse passed in 2019. Pt was initially at home then AL and finally transitioned to LTC. ZACK spoke with Radha at [facility] Pt is LTC private pay. He can return when ready.       ECOC on chart for MD signature

## 2021-10-01 NOTE — CONSULTS
[facility]  [phone]      Attending Physician: Ike Rod MD  Reason for Consultation/Chief Complaint: elevated trops    Subjective   History of Present Illness:  Jorje Davidson is a 78 y.o. patient who presented to the hospital with complaints of AMS. Patient is significantly confused and not able to give medical history. He knows his name only but does not know location or date. He denies any active chest pain history of chest pain. He thinks he is going to a physician appointment later to have his \"intestines taken out\". 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 Surgical History:   has a past surgical history that includes Cardiac surgery (2006); Lung surgery (2006); Kidney stone surgery (1996); Eye surgery (Bilateral, 2003); and Colonoscopy. Social History:   reports that he has never smoked. He has never used smokeless tobacco. He reports that he does not drink alcohol and does not use drugs. Family History:  family history includes Diabetes in his mother. Home Medications:  Were reviewed and are listed in nursing record and/or below  Prior to Admission medications    Medication Sig Start Date End Date Taking?  Authorizing Provider   atorvastatin (LIPITOR) 40 MG tablet Take 40 mg by mouth nightly   Yes Historical Provider, MD   guaiFENesin (ROBITUSSIN) 100 MG/5ML liquid Take 200 mg by mouth 3 times daily as needed for Cough   Yes Historical Provider, MD   sodium bicarbonate 650 MG tablet Take 650 mg by mouth daily   Yes Historical Provider, MD   glipiZIDE (GLUCOTROL) 10 MG tablet TAKE ONE TABLET BY MOUTH TWICE A DAY BEFORE MEALS  Patient taking differently: 5 mg 2 times daily (before meals)  7/25/16 Edwardo Lockett, DO   insulin detemir (LEVEMIR FLEXTOUCH) 100 UNIT/ML injection pen Inject 14 Units into the skin nightly 6/6/16   Spero Loop, DO   torsemide (DEMADEX) 20 MG tablet Take 1/2---1  qd 1/27/16   Spero Loop, DO   aspirin 81 MG tablet Take 81 mg by mouth daily    Historical Provider, MD   glucose blood VI test strips (ACCU-CHEK ROBERT) STRP 1 Container by In Vitro route daily 7/14/15   Nestor Lockett, DO   Insulin Pen Needle 31G X 8 MM MISC 1 each by Does not apply route daily. 11/4/14   Edwardo Lockett, DO   Ascorbic Acid (VITAMIN C) 250 MG tablet Take 500 mg by mouth daily. Historical Provider, MD   Cholecalciferol (VITAMIN D3) 2000 UNITS CAPS Take  by mouth daily. Historical Provider, MD   carvedilol (COREG) 25 MG tablet Take 12.5 mg by mouth 2 times daily (with meals)     Historical Provider, MD   Iron TABS Take 25 mg by mouth. Historical Provider, MD   Omega-3 Fatty Acids (OMEGA 3 PO) Take 1,200 mg by mouth 3 times daily.     Historical Provider, MD        CURRENT Medications:  pantoprazole (PROTONIX) injection 40 mg, BID  linezolid (ZYVOX) IVPB 600 mg, Q12H  cefTRIAXone (ROCEPHIN) 1000 mg IVPB in 50 mL D5W minibag, Q24H  carvedilol (COREG) tablet 25 mg, BID WC  insulin glargine (LANTUS) injection vial 14 Units, Nightly  atorvastatin (LIPITOR) tablet 20 mg, Daily  [Held by provider] aspirin EC tablet 81 mg, Daily  0.9 % sodium chloride infusion, Continuous  sodium chloride flush 0.9 % injection 10 mL, 2 times per day  sodium chloride flush 0.9 % injection 10 mL, PRN  0.9 % sodium chloride infusion, PRN  magnesium sulfate 2000 mg in 50 mL IVPB premix, PRN  promethazine (PHENERGAN) tablet 12.5 mg, Q6H PRN   Or  ondansetron (ZOFRAN) injection 4 mg, Q6H PRN  senna (SENOKOT) tablet 8.6 mg, Daily PRN  perflutren lipid microspheres (DEFINITY) injection 1.65 mg, ONCE PRN  insulin lispro (HUMALOG) injection vial 0-12 Units, TID WC  insulin lispro (HUMALOG) injection vial 0-6 Units, Nightly  glucose (GLUTOSE) 40 % oral gel 15 g, PRN  dextrose 50 % IV solution, PRN  glucagon (rDNA) injection 1 mg, PRN  dextrose 5 % solution, PRN  Vitamin D (CHOLECALCIFEROL) tablet 2,000 Units, Daily        Allergies:  Ace inhibitors and Morphine     Review of Systems:   A 14 point review of symptoms completed. Pertinent positives identified in the HPI, all other review of symptoms negative as below. Objective   PHYSICAL EXAM:    Vitals:    10/01/21 0345   BP: 138/71   Pulse: 84   Resp: 16   Temp: 97.9 °F (36.6 °C)   SpO2: 98%    Weight: 150 lb (68 kg)         General Appearance:  Alert, cooperative, no distress, appears stated age   Head:  Normocephalic, without obvious abnormality, atraumatic   Eyes:  PERRL, conjunctiva/corneas clear   Nose: Nares normal, no drainage or sinus tenderness   Throat: Lips, mucosa, and tongue normal   Neck: Supple, symmetrical, trachea midline, no adenopathy, thyroid: not enlarged, symmetric, no tenderness/mass/nodules, no carotid bruit or JVD   Lungs:   Clear to auscultation bilaterally, respirations unlabored   Chest Wall:  No deformity or tenderness   Heart:  Regular rate and rhythm, S1, S2 normal, no murmur, rub or gallop   Abdomen:   Soft, non-tender, bowel sounds active all four quadrants,  no masses, no organomegaly   Extremities: Extremities normal, atraumatic, no cyanosis, 1-2+ edema left lower extremity.   None on the right   Pulses: 2+ and symmetric   Skin: Skin color, texture, turgor normal, no rashes or lesions   Pysch: Normal mood and affect   Neurologic: Normal gross motor and sensory exam.         Labs   CBC:   Lab Results   Component Value Date    WBC 4.8 10/01/2021    RBC 2.78 10/01/2021    HGB 9.0 10/01/2021    HCT 26.7 10/01/2021    MCV 95.8 10/01/2021    RDW 14.9 10/01/2021     10/01/2021     CMP:  Lab Results   Component Value Date     10/01/2021    K 4.6 10/01/2021     10/01/2021    CO2 18 10/01/2021    BUN 44 10/01/2021    CREATININE 3.0 10/01/2021 GFRAA 25 10/01/2021    AGRATIO 1.6 10/01/2021    LABGLOM 21 10/01/2021    GLUCOSE 133 10/01/2021    PROT 5.7 10/01/2021    CALCIUM 8.5 10/01/2021    BILITOT 0.3 10/01/2021    ALKPHOS 71 10/01/2021    AST 11 10/01/2021    ALT 10 10/01/2021     PT/INR:  No results found for: PTINR  HgBA1c:  Lab Results   Component Value Date    LABA1C 8.5 06/06/2016     Lab Results   Component Value Date    TROPONINI 0.08 (H) 10/01/2021         Cardiac Data     Last EKG:   10/1/2021 normal sinus rhythm at 80 bpm.  Nonspecific ST changes. Incomplete left bundle branch block with repolarization abnormalities laterally. QTc 465. Echo:    Stress Test:    Cath:    Studies:     Assessment and Plan      1. AMS: per IM  2. Elevated Trop: flat, not c/w plaque rupture  3. Acute systolic cardiomyopathy  4. Possible GIB: BRBPR  5. ADRIAN: Cr 3  6. Normocytic anemia      PLAN  1. EKG not significantly changed from previous. Elevated troponins are flat and more consistent with type II MI/supply demand mismatch. His troponins are also affected by ADRIAN but higher than would be expected so will need an ischemic eval once clinically stable  2. Reviewed echo being performed at bedside which does show a new cardiomyopathy. Wall motion does not follow any particular single-vessel. Possibly chornic   -We will likely need cardiac cath based on clinical outcome. Resolution of infection, evaluation for possible GI bleed, and resolution of renal failure  3. Continue aspirin 81 mg, Lipitor, Coreg, as tolerated.   -Hold ACE/ARB/Arni due to ADRIAN   -No Lasix needed at this time but will follow. Unilateral left leg edema  4.   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 Please call me with any questions [phone]. Harrison Juarez MD, [address] Cardiologist  [address]  [phone] [facility]  [phone] [address] [place] [address]  10/1/2021 8:17 AM    I will address the patient's cardiac risk factors and adjusted pharmacologic treatment as needed. In addition, I have reinforced the need for patient directed risk factor modification. All questions and concerns were addressed to the patient/family. Alternatives to my treatment were discussed. The note was completed using EMR. Every effort was made to ensure accuracy; however, inadvertent computerized transcription errors may be present.

## 2021-10-01 NOTE — CONSULTS
[facility]    [facility]              [phone]                         Interval History and plan:      Creatinine down to 3 from peak of 3.4  He is still confused  On room air no edema  Blood pressure is stable  Chest x-ray shows possible congestive heart failure    Plan:  Advanced CKD but his creatinine is now at baseline unlikely to cause of altered mental status  We will continue to follow without fluids since he is at baseline renal function at this time  Agree with holding torsemide  Will check for viral serologies SPEP and UPEP and protein creatinine ratio                   Assessment :     Acute Kidney Injury on CKD 4  ADRIAN likely due to -prerenal etiology  Cr on consultation 3.3  Baseline Cr- 3  CKD likely due to diabetes hypertension recurrent ADRIAN  He was admitted to [facility] on 6/29- 7/8/21- with ADRIAN  Cr at DC was 3.38 from peak of 4.75(pre-renal)  UA- small blood, small nitrite, wbc 21-50  Renal Imaging:-10/21-right kidney 8.2 cm, left 8.3 cm, no hydronephrosis  Echo: pending  7/21- nromal EF, RV pressure < 35 mmHg      Hypertension   BP: (159)/(76)  Pulse:  [83]   BP goal inpatient [id number] systolic inpatient    Anemia of CKD  Iron sat normal  Hb 9.5 on consult    Acidosis  Oral bicarb ok    BPH  History of renal stones  CAD  Carotid artery occlusion  Diabetes mellitus        [facility] would like to thank Thais Ramos MD   for opportunity to serve this patient      Please call with questions at-   24 Hrs Answering service [phone] or  7 am- 5 pm via Perfect serve or cell phone  Amber Ballesteros MD          CC/reason for consult :     ADRIAN     HPI :     Paul Zarate is a 78 y.o. male presented to   the hospital on 9/30/2021 with altered mental status from nursing facility. He is unable to provide details. Per reports he has had bright red blood per rectum.   He was brought to the emergency room where work-up showed possible inferolateral ischemia on the heart and had QTc prolongation. CT scan of the head was done which was negative. He is scheduled to do MRI but has not done yet. He was found to have metabolic acidosis, ADRIAN. Also elevation of troponin. He had a UA done which showed possible infection, culture is pending also blood culture is sent and results are pending. He is currently on IV antibiotics. We are consulted for ADRIAN and related issues    ROS:     Seen with-no family    Unable to obtain ROS due to  Altered mental status           PMH/PSH/SH/Family History:     Past Medical History:   Diagnosis Date    CAD (coronary artery disease)     Chronic renal disease     Dr. 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 [address]     Type II or unspecified type diabetes mellitus without mention of complication, not stated as uncontrolled        Past Surgical History:   Procedure Laterality Date    CARDIAC SURGERY  2006    quad by pass    COLONOSCOPY      pt refuses to have one    EYE SURGERY Bilateral 2003   [address] LUNG SURGERY  2006    scraped lungs        reports that he has never smoked. He has never used smokeless tobacco. He reports that he does not drink alcohol and does not use drugs. family history includes Diabetes in his mother.          Medication:     Current Facility-Administered Medications: pantoprazole (PROTONIX) injection 40 mg, 40 mg, IntraVENous, BID  linezolid (ZYVOX) IVPB 600 mg, 600 mg, IntraVENous, Q12H  cefTRIAXone (ROCEPHIN) 1000 mg IVPB in 50 mL D5W minibag, 1,000 mg, IntraVENous, Q24H  carvedilol (COREG) tablet 25 mg, 25 mg, Oral, BID WC  insulin glargine (LANTUS) injection vial 14 Units, 14 Units, SubCUTAneous, Nightly  atorvastatin (LIPITOR) tablet 20 mg, 20 mg, Oral, Daily  [Held by provider] aspirin EC tablet 81 mg, 81 mg, Oral, Daily  sodium chloride flush 0.9 % injection 10 mL, 10 mL, IntraVENous, 2 times per day  sodium chloride flush 0.9 % injection 10 mL, 10 mL, IntraVENous, PRN  0.9 % sodium chloride infusion, 25 mL, IntraVENous, PRN  magnesium sulfate 2000 mg in 50 mL IVPB premix, 2,000 mg, IntraVENous, PRN  promethazine (PHENERGAN) tablet 12.5 mg, 12.5 mg, Oral, Q6H PRN **OR** ondansetron (ZOFRAN) injection 4 mg, 4 mg, IntraVENous, Q6H PRN  senna (SENOKOT) tablet 8.6 mg, 1 tablet, Oral, Daily PRN  perflutren lipid microspheres (DEFINITY) injection 1.65 mg, 1.5 mL, IntraVENous, ONCE PRN  insulin lispro (HUMALOG) injection vial 0-12 Units, 0-12 Units, SubCUTAneous, TID WC  insulin lispro (HUMALOG) injection vial 0-6 Units, 0-6 Units, SubCUTAneous, Nightly  glucose (GLUTOSE) 40 % oral gel 15 g, 15 g, Oral, PRN  dextrose 50 % IV solution, 12.5 g, IntraVENous, PRN  glucagon (rDNA) injection 1 mg, 1 mg, IntraMUSCular, PRN  dextrose 5 % solution, 100 mL/hr, IntraVENous, PRN  Vitamin D (CHOLECALCIFEROL) tablet 2,000 Units, 2,000 Units, Oral, Daily       Vitals :     Vitals:    10/01/21 0915   BP: (!) 159/76   Pulse: 83   Resp: 18   Temp: 98.1 °F (36.7 °C)   SpO2: 98%       I & O :       Intake/Output Summary (Last 24 hours) at 10/1/2021 1351  Last data filed at 9/30/2021 2311  Gross per 24 hour   Intake 0 ml   Output 150 ml   Net -150 ml        Physical Examination :     General appearance: confused,   HEENT: Lips- normal, teeth- ok , oral mucosa- normal  Neck : Mass- no, appears symmetrical, JVD- not visible  Respiratory: Respiratory effort-  normal, wheeze- no, crackles - no  Cardiovascular:  Ausculation- No M/R/G, Edema none  Abdomen: visible mass- no, distention- no, scar- no, tenderness- no                            hepatosplenomegaly-  No--  Musculoskeletal:  clubbing no,cyanosis- no , digital ischemia- no                           muscle strength- patient unable to co-operate     , tone - patient unable to co-operate   Skin: rashes- no , ulcers- no, induration- no, tightening - no  Psychiatric:  confused   Additional findings:-        LABS: Recent Labs     09/30/21  1437 10/01/21  0623 10/01/21  1100   WBC 4.7 4.8  --    HGB 9.3* 9.0* 9.5*   HCT 28.1* 26.7* 27.9*   * 127*  --      Recent Labs     09/30/21  1437 10/01/21  0623    145   K 4.4 4.6   * 113*   CO2 20* 18*   BUN 50* 44*   CREATININE 3.4* 3.0*   GLUCOSE 151* 133*

## 2021-10-01 NOTE — PROGRESS NOTES
In questioning this patient he stated that he was going to have pacemaker implanted but decided not to. NO chest X-ray here. However found chest x-ray in care everywhere from June 2021 stating that there was leads in his chest. Put MRI on HOLD until we can be sure he is safe to scan.   [facility] MRI [id number]

## 2021-10-01 NOTE — DISCHARGE INSTR - COC
Continuity of Care Form    Patient Name: Paul Zarate   :  1945  MRN:  [id number]    Admit date:  2021  Discharge date: 10/19/21     Code Status Order: Full Code   Advance Directives:      Admitting Physician:  Dee Dee Brink MD  PCP: Yobani Ortega    [facility]ing [facility] 9164/8871-01  Discharging Unit Phone Number: [phone]    Emergency Contact:   Extended Emergency Contact Information  Primary Emergency Contact: Kylee Black  Mobile Phone: [phone]  Relation: Child    Past Surgical History:  Past Surgical History:   Procedure Laterality Date    CARDIAC SURGERY      quad by pass    COLONOSCOPY      pt refuses to have one    EYE SURGERY Bilateral    [address] LUNG SURGERY      scraped lungs       Immunization History: There is no immunization history on file for this patient.     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 ([facility] [address] E13.319    Acute renal failure superimposed on chronic kidney disease (HCC) N17.9, N18.9    Acute UTI N39.0    Acute encephalopathy G93.40    Elevated troponin R77.8    Cardiomyopathy (HCC) I42.9    Normocytic anemia D64.9       Isolation/Infection:   Isolation            No Isolation          Patient Infection Status       None to display            Nurse Assessment:  Last Vital Signs: BP (!) 159/76   Pulse 83   Temp 98.1 °F (36.7 °C) (Oral)   Resp 18   Ht 5' 8\" (1.727 m)   Wt 150 lb (68 kg)   SpO2 98%   BMI 22.81 kg/m²     Last documented pain score (0-10 scale):    Last Weight:   Wt Readings from Last 1 Encounters:   21 150 lb (68 kg)     Mental Status:  disoriented, alert, coherent and able to concentrate and follow conversation    IV Access:  - None    Nursing Mobility/ADLs:  Walking   Assisted  Transfer  Assisted  Bathing  Assisted  Dressing  Assisted  Toileting  Assisted  Feeding  Independent  Med Admin  Assisted  Med Delivery   whole    Wound Care Documentation and Therapy:        Elimination:  Continence:   · Bowel: No  · Bladder: No  Urinary Catheter: None   Colostomy/Ileostomy/Ileal Conduit: No       Date of Last BM: 10/04/21    Intake/Output Summary (Last 24 hours) at 10/1/2021 1613  Last data filed at 9/30/2021 2311  Gross per 24 hour   Intake 0 ml   Output 150 ml   Net -150 ml     I/O last 3 completed shifts: In: 0   Out: 150 [Urine:150]    Safety Concerns: At Risk for Falls    Impairments/Disabilities:      None    Nutrition Therapy:  Current Nutrition Therapy:   - Oral Diet:  Carb Control 4 carbs/meal (1800kcals/day)    Routes of Feeding: Oral  Liquids: Thin Liquids  Daily Fluid Restriction: no  Last Modified Barium Swallow with Video (Video Swallowing Test): not done    Treatments at the Time of Hospital Discharge:   Respiratory Treatments: none  Oxygen Therapy:  is not on home oxygen therapy.   Ventilator:    - No ventilator support    Rehab Therapies: Physical Therapy and Occupational Therapy  Weight Bearing Status/Restrictions: No weight bearing restirctions  Other Medical Equipment (for information only, NOT a DME order):  wheelchair and walker  Other Treatments: none    Patient's personal belongings (please select all that are sent with patient):  None    RN SIGNATURE:  Electronically signed by Erik Phoenix RN on 10/5/21 at 1:22 PM EDT    CASE MANAGEMENT/SOCIAL WORK SECTION    Inpatient Status Date: 9/30/21    Readmission Risk Assessment Score:  Readmission Risk              Risk of Unplanned Readmission:  15           Discharging to Facility/ Agency   · Name: [facility]  · Address:  · Phone: [phone]  · Fax: [phone]     / signature: Electronically signed by Lynn Barros RN on 10/19/21 at 3:30 PM EDT    PHYSICIAN SECTION    Prognosis: Poor    Condition at Discharge: Terminal    Rehab Potential (if transferring to Rehab): Poor    Recommended Labs or Other Treatments After Discharge: Family met with hospice and hospice care at [facility]  Recommended Follow-up, Labs or Other Treatments After Discharge:    Comfort measures by hospice as well as MD at the long-term care  May discontinue p.o. medications when away appropriate       Physician Certification: I certify the above information and transfer of Reshma De Guzman  is necessary for the continuing treatment of the diagnosis listed and that he requires Hospice for less 30 days.      Update Admission H&P: No change in H&P    PHYSICIAN SIGNATURE:  Electronically signed by Ghada Lomeli MD on 10/19/21 at 2:57 PM EDT

## 2021-10-02 LAB
ANION GAP SERPL CALCULATED.3IONS-SCNC: 13 MMOL/L (ref 3–16)
BASOPHILS ABSOLUTE: 0 K/UL (ref 0–0.2)
BASOPHILS RELATIVE PERCENT: 0.5 %
BUN BLDV-MCNC: 41 MG/DL (ref 7–20)
CALCIUM SERPL-MCNC: 8.5 MG/DL (ref 8.3–10.6)
CHLORIDE BLD-SCNC: 114 MMOL/L (ref 99–110)
CO2: 17 MMOL/L (ref 21–32)
CREAT SERPL-MCNC: 3.1 MG/DL (ref 0.8–1.3)
EOSINOPHILS ABSOLUTE: 0.3 K/UL (ref 0–0.6)
EOSINOPHILS RELATIVE PERCENT: 5.5 %
GFR AFRICAN AMERICAN: 24
GFR NON-AFRICAN AMERICAN: 20
GLUCOSE BLD-MCNC: 104 MG/DL (ref 70–99)
GLUCOSE BLD-MCNC: 112 MG/DL (ref 70–99)
GLUCOSE BLD-MCNC: 160 MG/DL (ref 70–99)
GLUCOSE BLD-MCNC: 175 MG/DL (ref 70–99)
GLUCOSE BLD-MCNC: 191 MG/DL (ref 70–99)
HCT VFR BLD CALC: 27.8 % (ref 40.5–52.5)
HEMOGLOBIN: 9.1 G/DL (ref 13.5–17.5)
LYMPHOCYTES ABSOLUTE: 0.7 K/UL (ref 1–5.1)
LYMPHOCYTES RELATIVE PERCENT: 14.6 %
MCH RBC QN AUTO: 32 PG (ref 26–34)
MCHC RBC AUTO-ENTMCNC: 32.8 G/DL (ref 31–36)
MCV RBC AUTO: 97.4 FL (ref 80–100)
MONOCYTES ABSOLUTE: 0.4 K/UL (ref 0–1.3)
MONOCYTES RELATIVE PERCENT: 8.5 %
NEUTROPHILS ABSOLUTE: 3.6 K/UL (ref 1.7–7.7)
NEUTROPHILS RELATIVE PERCENT: 70.9 %
PDW BLD-RTO: 15.5 % (ref 12.4–15.4)
PERFORMED ON: ABNORMAL
PLATELET # BLD: 131 K/UL (ref 135–450)
PMV BLD AUTO: 10 FL (ref 5–10.5)
POTASSIUM REFLEX MAGNESIUM: 3.9 MMOL/L (ref 3.5–5.1)
RBC # BLD: 2.85 M/UL (ref 4.2–5.9)
SODIUM BLD-SCNC: 144 MMOL/L (ref 136–145)
WBC # BLD: 5.1 K/UL (ref 4–11)

## 2021-10-02 PROCEDURE — 85025 COMPLETE CBC W/AUTO DIFF WBC: CPT

## 2021-10-02 PROCEDURE — 2580000003 HC RX 258: Performed by: HOSPITALIST

## 2021-10-02 PROCEDURE — 87086 URINE CULTURE/COLONY COUNT: CPT

## 2021-10-02 PROCEDURE — 99233 SBSQ HOSP IP/OBS HIGH 50: CPT | Performed by: INTERNAL MEDICINE

## 2021-10-02 PROCEDURE — 1200000000 HC SEMI PRIVATE

## 2021-10-02 PROCEDURE — 80048 BASIC METABOLIC PNL TOTAL CA: CPT

## 2021-10-02 PROCEDURE — 36415 COLL VENOUS BLD VENIPUNCTURE: CPT

## 2021-10-02 PROCEDURE — 6360000002 HC RX W HCPCS: Performed by: HOSPITALIST

## 2021-10-02 PROCEDURE — 99232 SBSQ HOSP IP/OBS MODERATE 35: CPT | Performed by: NURSE PRACTITIONER

## 2021-10-02 PROCEDURE — 6370000000 HC RX 637 (ALT 250 FOR IP): Performed by: HOSPITALIST

## 2021-10-02 PROCEDURE — 83036 HEMOGLOBIN GLYCOSYLATED A1C: CPT

## 2021-10-02 PROCEDURE — C9113 INJ PANTOPRAZOLE SODIUM, VIA: HCPCS | Performed by: HOSPITALIST

## 2021-10-02 RX ORDER — PANTOPRAZOLE SODIUM 40 MG/10ML
40 INJECTION, POWDER, LYOPHILIZED, FOR SOLUTION INTRAVENOUS DAILY
Status: DISCONTINUED | OUTPATIENT
Start: 2021-10-03 | End: 2021-10-03

## 2021-10-02 RX ADMIN — LINEZOLID 600 MG: 600 INJECTION, SOLUTION INTRAVENOUS at 10:24

## 2021-10-02 RX ADMIN — PANTOPRAZOLE SODIUM 40 MG: 40 INJECTION, POWDER, FOR SOLUTION INTRAVENOUS at 10:28

## 2021-10-02 RX ADMIN — INSULIN GLARGINE 14 UNITS: 100 INJECTION, SOLUTION SUBCUTANEOUS at 21:15

## 2021-10-02 RX ADMIN — LINEZOLID 600 MG: 600 INJECTION, SOLUTION INTRAVENOUS at 02:28

## 2021-10-02 RX ADMIN — LINEZOLID 600 MG: 600 INJECTION, SOLUTION INTRAVENOUS at 23:46

## 2021-10-02 RX ADMIN — SODIUM CHLORIDE, PRESERVATIVE FREE 10 ML: 5 INJECTION INTRAVENOUS at 09:59

## 2021-10-02 RX ADMIN — INSULIN LISPRO 1 UNITS: 100 INJECTION, SOLUTION INTRAVENOUS; SUBCUTANEOUS at 21:18

## 2021-10-02 RX ADMIN — CARVEDILOL 25 MG: 25 TABLET, FILM COATED ORAL at 17:26

## 2021-10-02 RX ADMIN — Medication 2000 UNITS: at 10:27

## 2021-10-02 RX ADMIN — SODIUM CHLORIDE 25 ML: 9 INJECTION, SOLUTION INTRAVENOUS at 02:27

## 2021-10-02 RX ADMIN — SODIUM CHLORIDE, PRESERVATIVE FREE 10 ML: 5 INJECTION INTRAVENOUS at 21:15

## 2021-10-02 RX ADMIN — CEFTRIAXONE SODIUM 1000 MG: 1 INJECTION, POWDER, FOR SOLUTION INTRAMUSCULAR; INTRAVENOUS at 01:41

## 2021-10-02 RX ADMIN — ATORVASTATIN CALCIUM 20 MG: 10 TABLET, FILM COATED ORAL at 10:27

## 2021-10-02 RX ADMIN — SODIUM CHLORIDE 25 ML: 9 INJECTION, SOLUTION INTRAVENOUS at 01:40

## 2021-10-02 RX ADMIN — CEFTRIAXONE SODIUM 1000 MG: 1 INJECTION, POWDER, FOR SOLUTION INTRAMUSCULAR; INTRAVENOUS at 22:46

## 2021-10-02 RX ADMIN — CARVEDILOL 25 MG: 25 TABLET, FILM COATED ORAL at 10:28

## 2021-10-02 RX ADMIN — SODIUM CHLORIDE, PRESERVATIVE FREE 10 ML: 5 INJECTION INTRAVENOUS at 02:29

## 2021-10-02 ASSESSMENT — PAIN SCALES - GENERAL
PAINLEVEL_OUTOF10: 0

## 2021-10-02 NOTE — PROGRESS NOTES
Francisca Moreno  Neurology Follow-up  [facility]    Date of Service: 10/2/2021    Subjective:   CC: Follow up today regarding:     Events noted. Chart and lab reviewed. ROS : A 10-12 system review could not be obtained due to poor cooperation and confusion. family history includes Diabetes in his mother.     Past Medical History:   Diagnosis Date    CAD (coronary artery disease)     Chronic renal disease     Dr. 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 ([facility] [address]     Type II or unspecified type diabetes mellitus without mention of complication, not stated as uncontrolled      Current Facility-Administered Medications   Medication Dose Route Frequency Provider Last Rate Last Admin    pantoprazole (PROTONIX) injection 40 mg  40 mg IntraVENous BID Gin Laguna MD   40 mg at 10/01/21 0225    linezolid (ZYVOX) IVPB 600 mg  600 mg IntraVENous Q12H Gin Laguna MD   Stopped at 10/02/21 0328    cefTRIAXone (ROCEPHIN) 1000 mg IVPB in 50 mL D5W minibag  1,000 mg IntraVENous Q24H Gin Laguna MD   Stopped at 10/02/21 0213    carvedilol (COREG) tablet 25 mg  25 mg Oral BID WC Gin Laguna MD   25 mg at 10/01/21 1816    insulin glargine (LANTUS) injection vial 14 Units  14 Units SubCUTAneous Nightly Gin Laguna MD   14 Units at 10/01/21 0047    atorvastatin (LIPITOR) tablet 20 mg  20 mg Oral Daily Gin Laguna MD   20 mg at 10/01/21 1818    [Held by provider] aspirin EC tablet 81 mg  81 mg Oral Daily Gin Laguna MD        sodium chloride flush 0.9 % injection 10 mL  10 mL IntraVENous 2 times per day Gin Laguna MD   10 mL at 10/02/21 0229    sodium chloride flush 0.9 % injection 10 mL  10 mL IntraVENous PRN Gin Laguna MD        0.9 % sodium chloride infusion  25 mL IntraVENous PRN Gin Laguna MD   Stopped at 10/02/21 0344    magnesium sulfate 2000 mg in 50 mL IVPB premix  2,000 mg IntraVENous PRN Gin Lgauna MD        promethazine (PHENERGAN) tablet 12.5 mg  12.5 mg Oral Q6H PRN Vanessa Fernandez MD        Or    ondansetron [facility]) injection 4 mg  4 mg IntraVENous Q6H PRN Vanessa Fernandez MD        senna (SENOKOT) tablet 8.6 mg  1 tablet Oral Daily PRN Vanessa Fernandez MD        perflutren lipid microspheres (DEFINITY) injection 1.65 mg  1.5 mL IntraVENous ONCE PRN Vanessa Fernandez MD        insulin lispro (HUMALOG) injection vial 0-12 Units  0-12 Units SubCUTAneous TID WC Vanessa Fernandez MD        insulin lispro (HUMALOG) injection vial 0-6 Units  0-6 Units SubCUTAneous Nightly Vanessa Fernandez MD        glucose (GLUTOSE) 40 % oral gel 15 g  15 g Oral PRN Vanessa Fernandez MD        dextrose 50 % IV solution  12.5 g IntraVENous PRN Vanessa Fernandez MD        glucagon (rDNA) injection 1 mg  1 mg IntraMUSCular PRN Vanessa Fernandez MD        dextrose 5 % solution  100 mL/hr IntraVENous PRN Vanessa Fernandez MD        Vitamin D (CHOLECALCIFEROL) tablet 2,000 Units  2,000 Units Oral Daily Vanessa Fernandez MD   2,000 Units at 10/01/21 1819     Allergies   Allergen Reactions    Ace Inhibitors      And arbs  cough    Morphine       reports that he has never smoked. He has never used smokeless tobacco. He reports that he does not drink alcohol and does not use drugs. Objective:  Constitutional:   Vitals:    10/01/21 1816 10/01/21 2300 10/02/21 0600 10/02/21 0610   BP: (!) 148/77 136/72 (!) 153/74    Pulse: 88 76 86    Resp:  18 18    Temp:  98.1 °F (36.7 °C) 97.8 °F (36.6 °C)    TempSrc:  Oral Oral    SpO2:  99% 96%    Weight:    135 lb 2.3 oz (61.3 kg)   Height:           General appearance: Confused   Mental Status:   AAO times one. Attention and concentration: poor, waxing and waning. Language: Fluent but nonsensical speech. Able to follow simple directions.   Recent memory: Impaired  Remote memory: Impaired  Poor fund of knowledge  Cranial Nerves:   II: Pupils: equal, round, reactive to light  III,IV,VI: No gaze preference. No nystagmus  V: Facial sensation: Grossly unremarkable. VII: Facial strength and movements: intact and symmetric  XII: Tongue movements : midline  Musculoskeletal:  The patient can move all 4 extremities. No apparent focal weakness. Tone: Normal tone. No rigidity. Reflexes: symmetric 2+ in both arms and legs  Coordination: No tremors  Sensation: Withdraws to pain  Gait cannot be examined due to confusion      Data:  LABS:   Lab Results   Component Value Date     10/02/2021    K 3.9 10/02/2021     10/02/2021    CO2 17 10/02/2021    BUN 41 10/02/2021    CREATININE 3.1 10/02/2021    GFRAA 24 10/02/2021    LABGLOM 20 10/02/2021    GLUCOSE 112 10/02/2021    CALCIUM 8.5 10/02/2021     Lab Results   Component Value Date    WBC 5.1 10/02/2021    RBC 2.85 10/02/2021    HGB 9.1 10/02/2021    HCT 27.8 10/02/2021    MCV 97.4 10/02/2021    RDW 15.5 10/02/2021     10/02/2021   No results found for: INR, PROTIME    Neuroimaging and labs were independently reviewed by me  Reviewed notes from different physicians. Impression:    Acute encephalopathy - likely metabolic due to underlying infection. Unable to have MRI due to sternal wires. ADRIAN on CKD  Possible GIB  Acute cystitis    Recommendation    Monitor on telemetry. Supportive care. Avoid sedatives and narcotics. Resume aspirin when okay with GI. Continue statin. Continue abx for UTI. Glycemic control. F/u A1c. Follow Cr levels. Marshall Armstrong, STEPHANE      This dictation was generated by voice recognition computer software. Although all attempts are made to edit the dictation for accuracy, there may be errors in the transcription that are not intended.

## 2021-10-02 NOTE — PROGRESS NOTES
Hospitalist Progress Note      PCP: Felicia Sinha    Date of Admission: 9/30/2021    Chief Complaint: [address] Course: Lexie Gage is a 78 y.o. male who presented to the ED from [facility] Located within [facility] where he currently resides, to be evaluated for altered mental status. Caretakers report that he is typically alert and oriented, but for the past 2 days has been noted to be acutely confused and withdrawn. There is also report the patient displayed BRBPR PTA. History is limited due to acute confusional state. Patient is currently without complaints.     Upon arrival to the ED EKG was obtained revealing NSR with ST and T wave abnormalities concerning for possible inferolateral ischemia; QTC prolongation noted. Head CT revealed no acute intracranial abnormality; small right mastoid effusion present. Initial labs were notable for metabolic acidosis with ADRIAN superimposed on CKD, CrCl 18 cc/min. Other abnormalities include troponin elevation 0.10, hypoalbuminemia, hyperglycemia 151. Labs obtained by admitting hospitalist revealed elevated procalcitonin 0.22, ammonia level normal at 12, and lactate 0.9.  UA suggestive of infection therefore sent for culture. Blood cultures x2 ordered STAT by admitting physician, and patient initiated on antibiotics to treat both UTI and potential mastoiditis.     Subjective: NAD, confused      Medications:  Reviewed    Infusion Medications    sodium chloride Stopped (10/02/21 0344)    dextrose       Scheduled Medications    pantoprazole  40 mg IntraVENous BID    linezolid  600 mg IntraVENous Q12H    cefTRIAXone (ROCEPHIN) IV  1,000 mg IntraVENous Q24H    carvedilol  25 mg Oral BID WC    insulin glargine  14 Units SubCUTAneous Nightly    atorvastatin  20 mg Oral Daily    aspirin  81 mg Oral Daily    sodium chloride flush  10 mL IntraVENous 2 times per day    insulin lispro  0-12 Units SubCUTAneous TID     insulin lispro  0-6 Units SubCUTAneous Nightly    Vitamin D  2,000 Units Oral Daily     PRN Meds: regadenoson, sodium chloride flush, sodium chloride, magnesium sulfate, promethazine **OR** ondansetron, senna, perflutren lipid microspheres, glucose, dextrose, glucagon (rDNA), dextrose      Intake/Output Summary (Last 24 hours) at 10/2/2021 1254  Last data filed at 10/2/2021 1135  Gross per 24 hour   Intake 1345.66 ml   Output 375 ml   Net 970.66 ml       Physical Exam Performed:    BP (!) 141/71   Pulse 77   Temp 97.7 °F (36.5 °C) (Oral)   Resp 20   Ht 5' 8\" (1.727 m)   Wt 135 lb 2.3 oz (61.3 kg) Comment: pt bedrest at this time   SpO2 93%   BMI 20.55 kg/m²     General appearance: No apparent distress, appears stated age and cooperative. HEENT: Pupils equal, round, and reactive to light. Conjunctivae/corneas clear. Neck: Supple, with full range of motion. No jugular venous distention. Trachea midline. Respiratory:  Normal respiratory effort. Clear to auscultation, bilaterally without Rales/Wheezes/Rhonchi. Cardiovascular: SELIN, RRR  Abdomen: Soft, non-tender, non-distended with normal bowel sounds. Musculoskeletal: LLE 1+edema. Full range of motion without deformity. Skin: Skin color-pale, texture, turgor normal.  No rashes or lesions. Neurologic:  Neurovascularly intact without any focal sensory/motor deficits. Cranial nerves: II-XII intact, grossly non-focal.  Psychiatric: Alert and oriented x1, poor insight  Capillary Refill: Brisk,3 seconds, normal   Peripheral Pulses: +2 palpable, equal bilaterally       Labs:   Recent Labs     09/30/21  1437 09/30/21  1437 10/01/21  0623 10/01/21  0623 10/01/21  1100 10/01/21  1600 10/02/21  0654   WBC 4.7  --  4.8  --   --   --  5.1   HGB 9.3*   < > 9.0*   < > 9.5* 9.1* 9.1*   HCT 28.1*   < > 26.7*   < > 27.9* 27.0* 27.8*   *  --  127*  --   --   --  131*    < > = values in this interval not displayed.      Recent Labs     09/30/21  1437 10/01/21  0623 10/02/21  0654    145 144   K 4.4 4.6 3.9   * 113* 114*   CO2 20* 18* 17*   BUN 50* 44* 41*   CREATININE 3.4* 3.0* 3.1*   CALCIUM 8.8 8.5 8.5     Recent Labs     09/30/21  1437 10/01/21  0623   AST 12* 11*   ALT 12 10   BILITOT 0.4 0.3   ALKPHOS 75 71     No results for input(s): INR in the last 72 hours. Recent Labs     09/30/21  1437 09/30/21  2253 10/01/21  0623   TROPONINI 0.10* 0.09* 0.08*       Urinalysis:      Lab Results   Component Value Date    NITRU Negative 09/30/2021    WBCUA 21-50 09/30/2021    BACTERIA 3+ 09/30/2021    RBCUA 5-10 09/30/2021    BLOODU SMALL 09/30/2021    SPECGRAV 1.015 09/30/2021    GLUCOSEU Negative 09/30/2021       Radiology:  VL Extremity Venous Left   Final Result      XR CHEST PORTABLE   Final Result   1. Congestive heart failure. US RETROPERITONEAL COMPLETE   Final Result   Grossly unremarkable ultrasound of the kidneys and urinary bladder. No   hydronephrosis. CT HEAD WO CONTRAST   Final Result   No acute intracranial abnormality.       Small right mastoid effusion         MRI BRAIN WO CONTRAST    (Results Pending)   NM Cardiac Stress Test Nuclear Imaging    (Results Pending)           Assessment/Plan:    Active Hospital Problems    Diagnosis     Elevated troponin [R77.8]     [facility] [I42.9]     Normocytic anemia [D64.9]     Acute renal failure superimposed on chronic kidney disease (HCC) [N17.9, N18.9]     Acute UTI [N39.0]     Acute encephalopathy [G93.40]     DM (diabetes mellitus), type 2 ([facility] [E11.9]     Hypertension [I10]     Hyperlipidemia [E78.5]     CAD (coronary artery disease) [I25.10]      Acute encephalopathy  -Admit to telemetry with Q4H neuro checks scheduled overnight  -Aspiration precautions and fall protocol in place  -Thyroid studies wnl, lactate 0.9, ammonia level-12, B12/folate wnl  -MRI of the head: unable to obtain d/t sternal wires  -PT/OT/SLP consultation placed  -NEURO consult placed for further recommendations     Acute UTI/ possible mastoiditis  -Patient admitted to telemetry floor with appropriate isolation measures in place  -Crystalloid IVF resuscitation initiated in ED, with maintenance fluids to infuse overnight; strict I's and O's  -Blood, urine cultures pending  -Patient initiated on IV Zyvox (positive ADRIAN) and rocephin empirically  -Serial CBC, CMP, lactate, procalcitonin, D-dimer to monitor treatment progression     ADRIAN on CKD  -Patient admitted to telemetry floor with strict I's and O's during stay  -Patient aggressively hydrated in ED with NS, with maintenance fluids continued overnight  -Straight cath for urinary retention greater than 300 cc  -Urine spot protein, microalbumin, FENa studies   -Complete renal ultrasound to assess for renal atrophy/LUTS-stable  -Nephrotoxic medications held, including home dosages of ACE-RI, diuretics, and NSAIDs  -Consultation placed to NEPHRO for further recommendations     BRBPR with anemia  -Admit to telemetry for continuous cardiac monitoring  -Type and screen performed with plans for PRBC transfusion for Hgb < 7  -Protonix 40 mg IV initiated BID  -Consultation placed to GI   -Advance diet,      CAD with elevated trop/ acute sHF  -Admit to telemetry floor with serial cardiac enzymes to be obtained overnight  -ECHO: EF 15-20%, Gr II DD, mod mr, mild ar/tr, mild pulm htn  -Continue Coreg and Zocor; ASA   -Consultation placed to cardiologist for further recommendations: plan for lexiscan myoview   -7/1/2021 echo:Summary:   Mild aortic root dilatation. There is mild mitral regurgitation. The left ventricular function is low normal.   Overall left ventricular ejection fraction is estimated to be 50-55%. There is borderline global hypokinesis of the left ventricle.    Right ventricular systolic pressure is normal at <35 mmHg.        Type 2 DM  -A1c ordered with results pending at time of dictation  -Home hypoglycemic medications placed on temporary hold  -Continue home dose Levemir  -Humalog medium dose SSI scheduled before meals and at bedtime based on POC glucose  -Carbohydrate restriction placed on diet    DVT Prophylaxis: scds  Diet: Diet NPO  ADULT DIET; Regular; 4 carb choices (60 gm/meal);  Low Sodium (2 gm)  Code Status: Full Code    PT/OT Eval Status: consulted    Dispo - pending course    Meagan Neri, DANGELO - CNP

## 2021-10-02 NOTE — PROGRESS NOTES
[address]   Daily Cardiovascular Progress Note    Admit Date: 9/30/2021    Chief complaint: Change in mental status  HPI:     History is not able to obtain from patient due to altered mental status, patient resented to the hospital in September 30, 2021, and in the course of work-up, troponin levels were found to be elevated, they were 0.10 and of down trended to 0.08. Our consult service saw patient and initially has recommended medical management, and consider cardiac catheterization pending clinical course. Echocardiogram was ordered, this was completed during hospital admission, EF was noted to be 15 to 20%. Today, no history is able to obtain from patient due to altered mental status. There does not appear to be any acute complaints/issues noted. Work-up in the hospital has been noteworthy for acute kidney injury, nephrology service has been consulted. Additionally, there was concern for possible GI bleed, GI service was consulted and expectant/medical therapy was being pursued as there was no evidence for gross bleeding. Patient typically sees [facility]. Megan Clem[facility] at [facility].  Last office visit was in February 2021, and patient did have echocardiogram at [facility] in July 2020 which showed ejection fraction of 50 to 55%.         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 ([address]    Acute renal failure superimposed on chronic kidney disease (HCC)    Acute UTI    Acute encephalopathy    Elevated troponin    Cardiomyopathy (HCC)    Normocytic anemia       Medications:  pantoprazole (PROTONIX) injection 40 mg, BID  linezolid (ZYVOX) IVPB 600 mg, Q12H  cefTRIAXone (ROCEPHIN) 1000 mg IVPB in 50 mL D5W minibag, Q24H  carvedilol (COREG) tablet 25 mg, BID WC  insulin glargine (LANTUS) injection vial 14 Units, Nightly  atorvastatin (LIPITOR) tablet 20 mg, Daily  [Held by provider] aspirin EC tablet 81 mg, Daily  sodium chloride flush 0.9 % injection 10 mL, 2 times per day  sodium chloride flush 0.9 % injection 10 mL, PRN  0.9 % sodium chloride infusion, PRN  magnesium sulfate 2000 mg in 50 mL IVPB premix, PRN  promethazine (PHENERGAN) tablet 12.5 mg, Q6H PRN   Or  ondansetron (ZOFRAN) injection 4 mg, Q6H PRN  senna (SENOKOT) tablet 8.6 mg, Daily PRN  perflutren lipid microspheres (DEFINITY) injection 1.65 mg, ONCE PRN  insulin lispro (HUMALOG) injection vial 0-12 Units, TID WC  insulin lispro (HUMALOG) injection vial 0-6 Units, Nightly  glucose (GLUTOSE) 40 % oral gel 15 g, PRN  dextrose 50 % IV solution, PRN  glucagon (rDNA) injection 1 mg, PRN  dextrose 5 % solution, PRN  Vitamin D (CHOLECALCIFEROL) tablet 2,000 Units, Daily           PHYSICAL EXAM   BP (!) 144/78   Pulse 82   Temp 97.8 °F (36.6 °C) (Axillary)   Resp 24   Ht 5' 8\" (1.727 m)   Wt 135 lb 2.3 oz (61.3 kg) Comment: pt bedrest at this time   SpO2 93%   BMI 20.55 kg/m²    Vitals:    10/01/21 2300 10/02/21 0600 10/02/21 0610 10/02/21 1015   BP: 136/72 (!) 153/74  (!) 144/78   Pulse: 76 86  82   Resp: 18 18  24   Temp: 98.1 °F (36.7 °C) 97.8 °F (36.6 °C)  97.8 °F (36.6 °C)   TempSrc: Oral Oral  Axillary   SpO2: 99% 96%  93%   Weight:   135 lb 2.3 oz (61.3 kg)    Height:             Intake/Output Summary (Last 24 hours) at 10/2/2021 1039  Last data filed at 10/2/2021 0658  Gross per 24 hour   Intake 1105.66 ml   Output 375 ml   Net 730.66 ml     Wt Readings from Last 3 Encounters:   10/02/21 135 lb 2.3 oz (61.3 kg)   06/06/16 137 lb (62.1 kg)   01/27/16 137 lb (62.1 kg)         Gen: Patient in NAD, resting comfortably, lying flat  Neck: No JVD or bruits  Respiratory: CTAB no WRR  Chest: normal without deformity  Cardiovascular:RRR, S1S2, 2/6 sm  Abdomen: Soft, NTND, Normal BS  Extremities: tc le edema  Neurological/Psychiatric: AxO x1, unable to evaluate due to altered mental status  Skin:  Warm and dry      Labs:  CBC:   Recent Labs     09/30/21  1437 09/30/21  1437 10/01/21  0623 10/01/21  0623 10/01/21  1100 10/01/21  1600 10/02/21  0654   WBC 4.7  --  4.8  --   --   --  5.1   HGB 9.3*   < > 9.0*   < > 9.5* 9.1* 9.1*   HCT 28.1*   < > 26.7*   < > 27.9* 27.0* 27.8*   MCV 98.8  --  95.8  --   --   --  97.4   *  --  127*  --   --   --  131*    < > = values in this interval not displayed. BMP:   Recent Labs     09/30/21  1437 10/01/21  0623 10/02/21  0654    145 144   K 4.4 4.6 3.9   * 113* 114*   CO2 20* 18* 17*   BUN 50* 44* 41*   CREATININE 3.4* 3.0* 3.1*     MG:  No results for input(s): MG in the last 72 hours. PT/INR: No results for input(s): PROTIME, INR in the last 72 hours. APTT: No results for input(s): APTT in the last 72 hours. Cardiac Enzymes:   Recent Labs     09/30/21  1437 09/30/21  2253 10/01/21  0623   TROPONINI 0.10* 0.09* 0.08*       Cardiac Studies:     Summary   The left ventricular systolic function is severely reduced with an ejection   fraction of 15-20 %. There is global with regional variations of wall motion, inferior wall moves   best   Grade II diastolic dysfunction with elevated filling pressure. Moderate mitral regurgitation. Mild aortic, tricuspid and aortic regurgitation. Right ventricular systolic function is moderately reduced . Systolic pulmonic artery pressure (SPAP) is estimated at 48 mmHg consistent   with mild pulmonary hypertension (Right atrial pressure of 8 mmHg). I have reviewed labs and imaging/xray/diagnostic testing in this note.     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 Overall, patient is not likely a very good candidate for an invasive strategy but would like to restratify further and consider noninvasive testing such as Lexiscan nuclear stress test and then consider cardiac catheterization thereafter pending clinical course. Card catheterization may be difficult to perform due to advanced age and comorbidities including renal dysfunction. CAD/ASHD, continue aspirin    Hypercholesteremia, continue statin    Ischemic cardio myopathy, continue beta-blocker, no ACE/ARB/ARNI/aldosterone antagonist due to renal insufficiency. Diabetes, possible UTI as per primary service patient is currently on antibiotics    Our service will follow up pending completion of Tylor Brock nuclear stress test, please call with any questions in the interim    Thank you for allowing me to participate in the care of your patient. Please call me with any questions [phone].       Blanca Jones MD, [facility]   Interventional Cardiologist  [facility]  [phone] [facility]  [phone] [address] [place] [address]  10/2/2021 10:39 AM

## 2021-10-02 NOTE — PROGRESS NOTES
[facility]    [facility]              [phone]                         Interval History and plan:      Seen and examined at bedside  Awake and alert, apparently improved mentation from prior notes. He has no complaints. No urinary symptoms. /71  Sating well on room air. Labs reviewed. Bicarb 17  BUN 41  Cr 3.1      Plan:  Renal function near baseline. No edema, on room air. Can hold diuretics. Assessment :     Acute Kidney Injury on CKD 4  ADRIAN likely due to -prerenal etiology  Cr on consultation 3.3  Baseline Cr- 3  CKD likely due to diabetes hypertension recurrent ADRIAN  He was admitted to [facility] on 6/29- 7/8/21- with ADRIAN  Cr at DC was 3.38 from peak of 4.75(pre-renal)  UA- small blood, small nitrite, wbc 21-50  Renal Imaging:-10/21-right kidney 8.2 cm, left 8.3 cm, no hydronephrosis  Echo: pending  7/21- nromal EF, RV pressure < 35 mmHg      Hypertension   BP: (141-144)/(71-78)  Pulse:  [77-82]   BP goal inpatient 112-011 systolic inpatient    Anemia of CKD  Iron sat normal  Hb 9.5 on consult    Acidosis  Oral bicarb ok    BPH  History of renal stones  CAD  Carotid artery occlusion  Diabetes mellitus        [address] Nephrology would like to thank Klever Martinez MD   for opportunity to serve this patient      Please call with questions at-   24 Hrs Answering service [phone] or  7 am- 5 pm via Perfect serve or cell phone  Vasile Hanna MD          CC/reason for consult :     ADRIAN     HPI :     Kalen Vázquez is a 78 y.o. male presented to   the hospital on 9/30/2021 with altered mental status from nursing facility. He is unable to provide details. Per reports he has had bright red blood per rectum. He was brought to the emergency room where work-up showed possible inferolateral ischemia on the heart and had QTc prolongation. CT scan of the head was done which was negative.   He is scheduled to do MRI but has not done yet.  He was found to have metabolic acidosis, ADRIAN. Also elevation of troponin. He had a UA done which showed possible infection, culture is pending also blood culture is sent and results are pending. He is currently on IV antibiotics. We are consulted for ADRIAN and related issues    ROS:     Positives Listed Bold. All other remaining systems are negative. Constitutional:  fever, chills, weakness, weight change, fatigue,      Skin:  rash, pruritus, hair loss, bruising, dry skin, petechiae. Head, Face, Neck   headaches, swelling,  cervical adenopathy. Respiratory: shortness of breath, cough, or wheezing  Cardiovascular: chest pain, palpitations, dizzy, edema  Gastrointestinal: nausea, vomiting, diarrhea, constipation,belly pain    Yellow skin, blood in stool  Musculoskeletal:  back pain, muscle weakness, gait problems,       joint pain or swelling. Genitourinary:  dysuria, poor urine flow, flank pain, blood in urine  Neurologic:  vertigo, TIA'S, syncope, seizures, focal weakness  Psychosocial:  insomnia, anxiety, or depression. Additional positive findings: -         PMH/PSH/SH/Family History:     Past Medical History:   Diagnosis Date    CAD (coronary artery disease)     Chronic renal disease     Dr. 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 He has never used smokeless tobacco. He reports that he does not drink alcohol and does not use drugs. family history includes Diabetes in his mother.          Medication:     Current Facility-Administered Medications: regadenoson (LEXISCAN) injection 0.4 mg, 0.4 mg, IntraVENous, ONCE PRN  [START ON 10/3/2021] pantoprazole (PROTONIX) injection 40 mg, 40 mg, IntraVENous, Daily  linezolid (ZYVOX) IVPB 600 mg, 600 mg, IntraVENous, Q12H  cefTRIAXone (ROCEPHIN) 1000 mg IVPB in 50 mL D5W minibag, 1,000 mg, IntraVENous, Q24H  carvedilol (COREG) tablet 25 mg, 25 mg, Oral, BID WC  insulin glargine (LANTUS) injection vial 14 Units, 14 Units, SubCUTAneous, Nightly  atorvastatin (LIPITOR) tablet 20 mg, 20 mg, Oral, Daily  aspirin EC tablet 81 mg, 81 mg, Oral, Daily  sodium chloride flush 0.9 % injection 10 mL, 10 mL, IntraVENous, 2 times per day  sodium chloride flush 0.9 % injection 10 mL, 10 mL, IntraVENous, PRN  0.9 % sodium chloride infusion, 25 mL, IntraVENous, PRN  magnesium sulfate 2000 mg in 50 mL IVPB premix, 2,000 mg, IntraVENous, PRN  promethazine (PHENERGAN) tablet 12.5 mg, 12.5 mg, Oral, Q6H PRN **OR** ondansetron (ZOFRAN) injection 4 mg, 4 mg, IntraVENous, Q6H PRN  senna (SENOKOT) tablet 8.6 mg, 1 tablet, Oral, Daily PRN  perflutren lipid microspheres (DEFINITY) injection 1.65 mg, 1.5 mL, IntraVENous, ONCE PRN  insulin lispro (HUMALOG) injection vial 0-12 Units, 0-12 Units, SubCUTAneous, TID WC  insulin lispro (HUMALOG) injection vial 0-6 Units, 0-6 Units, SubCUTAneous, Nightly  glucose (GLUTOSE) 40 % oral gel 15 g, 15 g, Oral, PRN  dextrose 50 % IV solution, 12.5 g, IntraVENous, PRN  glucagon (rDNA) injection 1 mg, 1 mg, IntraMUSCular, PRN  dextrose 5 % solution, 100 mL/hr, IntraVENous, PRN  Vitamin D (CHOLECALCIFEROL) tablet 2,000 Units, 2,000 Units, Oral, Daily       Vitals :     Vitals:    10/02/21 1129   BP: (!) 141/71   Pulse: 77   Resp: 20   Temp: 97.7 °F (36.5 °C)   SpO2: 93%       I & O :       Intake/Output Summary (Last 24 hours) at 10/2/2021 1447  Last data filed at 10/2/2021 1135  Gross per 24 hour   Intake 1345.66 ml   Output 375 ml   Net 970.66 ml        Physical Examination :     General appearance: male in NAD, alert and awake. HEENT: EOM intact, no icterus. Trachea is midline. Neck : No mass, appears symmetrical, no JVD   Respiratory: Respiratory effort appears normal, no wheeze, no crackles  Cardiovascular: Ausculation- No M/R/G, no edema  Abdomen: No visible mass or tenderness  Musculoskeletal:  No clubbing,cyanosis, joints with no swelling or deformity. Skin:no rashes, ulcers, induration, no jaundice. Neuro: face symmetric, no focal deficits. Appropriate responses. CN 2-12 grossly intact    Additional findings:-        LABS:     Recent Labs     09/30/21  1437 09/30/21  1437 10/01/21  0623 10/01/21  0623 10/01/21  1100 10/01/21  1600 10/02/21  0654   WBC 4.7  --  4.8  --   --   --  5.1   HGB 9.3*   < > 9.0*   < > 9.5* 9.1* 9.1*   HCT 28.1*   < > 26.7*   < > 27.9* 27.0* 27.8*   *  --  127*  --   --   --  131*    < > = values in this interval not displayed.      Recent Labs     09/30/21  1437 10/01/21  0623 10/02/21  0654    145 144   K 4.4 4.6 3.9   * 113* 114*   CO2 20* 18* 17*   BUN 50* 44* 41*   CREATININE 3.4* 3.0* 3.1*   GLUCOSE 151* 133* 112*

## 2021-10-03 LAB
ANION GAP SERPL CALCULATED.3IONS-SCNC: 11 MMOL/L (ref 3–16)
BASOPHILS ABSOLUTE: 0 K/UL (ref 0–0.2)
BASOPHILS RELATIVE PERCENT: 0.6 %
BUN BLDV-MCNC: 37 MG/DL (ref 7–20)
CALCIUM SERPL-MCNC: 8.5 MG/DL (ref 8.3–10.6)
CHLORIDE BLD-SCNC: 114 MMOL/L (ref 99–110)
CO2: 18 MMOL/L (ref 21–32)
CREAT SERPL-MCNC: 3.1 MG/DL (ref 0.8–1.3)
EOSINOPHILS ABSOLUTE: 0.3 K/UL (ref 0–0.6)
EOSINOPHILS RELATIVE PERCENT: 6.5 %
ESTIMATED AVERAGE GLUCOSE: 122.6 MG/DL
GFR AFRICAN AMERICAN: 24
GFR NON-AFRICAN AMERICAN: 20
GLUCOSE BLD-MCNC: 104 MG/DL (ref 70–99)
GLUCOSE BLD-MCNC: 105 MG/DL (ref 70–99)
GLUCOSE BLD-MCNC: 127 MG/DL (ref 70–99)
GLUCOSE BLD-MCNC: 83 MG/DL (ref 70–99)
GLUCOSE BLD-MCNC: 92 MG/DL (ref 70–99)
HBA1C MFR BLD: 5.9 %
HCT VFR BLD CALC: 26.9 % (ref 40.5–52.5)
HEMOGLOBIN: 9.1 G/DL (ref 13.5–17.5)
LYMPHOCYTES ABSOLUTE: 0.7 K/UL (ref 1–5.1)
LYMPHOCYTES RELATIVE PERCENT: 14.5 %
MCH RBC QN AUTO: 32.1 PG (ref 26–34)
MCHC RBC AUTO-ENTMCNC: 33.9 G/DL (ref 31–36)
MCV RBC AUTO: 94.7 FL (ref 80–100)
MONOCYTES ABSOLUTE: 0.4 K/UL (ref 0–1.3)
MONOCYTES RELATIVE PERCENT: 9 %
NEUTROPHILS ABSOLUTE: 3.1 K/UL (ref 1.7–7.7)
NEUTROPHILS RELATIVE PERCENT: 69.4 %
PDW BLD-RTO: 15 % (ref 12.4–15.4)
PERFORMED ON: ABNORMAL
PERFORMED ON: NORMAL
PLATELET # BLD: 127 K/UL (ref 135–450)
PMV BLD AUTO: 9.5 FL (ref 5–10.5)
POTASSIUM REFLEX MAGNESIUM: 3.8 MMOL/L (ref 3.5–5.1)
RBC # BLD: 2.84 M/UL (ref 4.2–5.9)
SODIUM BLD-SCNC: 143 MMOL/L (ref 136–145)
URINE CULTURE, ROUTINE: NORMAL
WBC # BLD: 4.5 K/UL (ref 4–11)

## 2021-10-03 PROCEDURE — 36415 COLL VENOUS BLD VENIPUNCTURE: CPT

## 2021-10-03 PROCEDURE — 80048 BASIC METABOLIC PNL TOTAL CA: CPT

## 2021-10-03 PROCEDURE — 85025 COMPLETE CBC W/AUTO DIFF WBC: CPT

## 2021-10-03 PROCEDURE — 51798 US URINE CAPACITY MEASURE: CPT

## 2021-10-03 PROCEDURE — 6360000002 HC RX W HCPCS: Performed by: HOSPITALIST

## 2021-10-03 PROCEDURE — 6370000000 HC RX 637 (ALT 250 FOR IP): Performed by: HOSPITALIST

## 2021-10-03 PROCEDURE — 51701 INSERT BLADDER CATHETER: CPT

## 2021-10-03 PROCEDURE — 2580000003 HC RX 258: Performed by: HOSPITALIST

## 2021-10-03 PROCEDURE — 97530 THERAPEUTIC ACTIVITIES: CPT

## 2021-10-03 PROCEDURE — 1200000000 HC SEMI PRIVATE

## 2021-10-03 PROCEDURE — C9113 INJ PANTOPRAZOLE SODIUM, VIA: HCPCS | Performed by: NURSE PRACTITIONER

## 2021-10-03 PROCEDURE — 6360000002 HC RX W HCPCS: Performed by: NURSE PRACTITIONER

## 2021-10-03 RX ORDER — HEPARIN SODIUM 5000 [USP'U]/ML
5000 INJECTION, SOLUTION INTRAVENOUS; SUBCUTANEOUS EVERY 8 HOURS SCHEDULED
Status: DISCONTINUED | OUTPATIENT
Start: 2021-10-03 | End: 2021-10-10

## 2021-10-03 RX ORDER — PANTOPRAZOLE SODIUM 40 MG/1
40 TABLET, DELAYED RELEASE ORAL
Status: DISCONTINUED | OUTPATIENT
Start: 2021-10-04 | End: 2021-10-10

## 2021-10-03 RX ADMIN — SODIUM CHLORIDE, PRESERVATIVE FREE 10 ML: 5 INJECTION INTRAVENOUS at 08:39

## 2021-10-03 RX ADMIN — Medication 2000 UNITS: at 08:39

## 2021-10-03 RX ADMIN — ASPIRIN 81 MG: 81 TABLET, COATED ORAL at 08:39

## 2021-10-03 RX ADMIN — PANTOPRAZOLE SODIUM 40 MG: 40 INJECTION, POWDER, FOR SOLUTION INTRAVENOUS at 08:39

## 2021-10-03 RX ADMIN — INSULIN GLARGINE 14 UNITS: 100 INJECTION, SOLUTION SUBCUTANEOUS at 21:13

## 2021-10-03 RX ADMIN — CARVEDILOL 25 MG: 25 TABLET, FILM COATED ORAL at 08:39

## 2021-10-03 RX ADMIN — CARVEDILOL 25 MG: 25 TABLET, FILM COATED ORAL at 16:03

## 2021-10-03 RX ADMIN — CEFTRIAXONE SODIUM 1000 MG: 1 INJECTION, POWDER, FOR SOLUTION INTRAMUSCULAR; INTRAVENOUS at 22:35

## 2021-10-03 RX ADMIN — LINEZOLID 600 MG: 600 INJECTION, SOLUTION INTRAVENOUS at 11:12

## 2021-10-03 RX ADMIN — ATORVASTATIN CALCIUM 20 MG: 10 TABLET, FILM COATED ORAL at 08:39

## 2021-10-03 RX ADMIN — SODIUM CHLORIDE, PRESERVATIVE FREE 10 ML: 5 INJECTION INTRAVENOUS at 21:13

## 2021-10-03 RX ADMIN — HEPARIN SODIUM 5000 UNITS: 5000 INJECTION INTRAVENOUS; SUBCUTANEOUS at 22:31

## 2021-10-03 RX ADMIN — LINEZOLID 600 MG: 600 INJECTION, SOLUTION INTRAVENOUS at 23:59

## 2021-10-03 RX ADMIN — HEPARIN SODIUM 5000 UNITS: 5000 INJECTION INTRAVENOUS; SUBCUTANEOUS at 15:58

## 2021-10-03 ASSESSMENT — PAIN SCALES - GENERAL
PAINLEVEL_OUTOF10: 0

## 2021-10-03 NOTE — PROGRESS NOTES
infection without hematuria, site unspecified and Altered mental status, unspecified altered mental status type were also pertinent to this visit. 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 has a past surgical history that includes Cardiac surgery (2006); Lung surgery (2006); Kidney stone surgery (1996); Eye surgery (Bilateral, 2003); and Colonoscopy. Restrictions  Restrictions/Precautions  Restrictions/Precautions: Up as Tolerated, General Precautions, Fall Risk  Position Activity Restriction  Other position/activity restrictions: Avasys monitor in room, legally blind  Subjective   General  Chart Reviewed: Yes  Response To Previous Treatment: Patient with no complaints from previous session. Family / Caregiver Present: No  Referring Practitioner: Bree Muro DO  Subjective  Subjective: Pt supine in bed on approach, pleasantly confused, agreeable to PT tx  General Comment  Comments: RN cleared pt for session, requesting for pt to remain in bed at end of session  Pain Screening  Patient Currently in Pain: Denies  Vital Signs  Pulse: 65  Heart Rate Source: Monitor  BP: 126/62  Patient Position: Semi fowlers  Patient Currently in Pain: Denies  Oxygen Therapy  SpO2: 100 %  Pulse Oximeter Device Mode: Intermittent  O2 Device: None (Room air)       Orientation  Orientation  Overall Orientation Status: Impaired  Orientation Level: Oriented to person;Disoriented to place; Disoriented to time;Disoriented to situation    Objective   Bed mobility  Supine to Sit: Moderate assistance (HOB elevated, use of BR, modA at trunk, increased time to complete, cues for sequence)  Sit to Supine:  Moderate assistance (modA for BLE, increased time to complete, cues for sequence)  Scooting: Maximal assistance (to scoot to [facility], cues for sequence)     Transfers  Sit to Stand: Moderate Assistance  Stand to sit: Moderate Assistance  Comment: EOB to RW, completed x 2 reps, cues for hand placement and anterior WS, pt with difficulty despte cues to facilitate anterior WS     Ambulation  Ambulation?: No (Trialed marching in place and x 2 lateral steps and forward steps but limited by safety and heavy posterior lean, unsafe to further progress gait with RW and A x 1)        Balance  Posture: Fair  Sitting - Static: Fair  Sitting - Dynamic: Fair;-  Standing - Static: Poor  Comments: Pt sitting EOB x 5 + 5 minutes with CGA to Ivelisse, increased posterior lean with Ivelisse to SBA with cues to correct and cues to maintain BLE on floor. Pt standing with RW with modA to maxA with heavy posterior lean with maximal cues and assistance to promote anterior WS, variable cues to facilitate including trial of marching in place and progression of lateral and and forwards stepping however pt unable to safely progress d/t continued heavy posterior lean. Time in standing limited by fatigue, posteiror lean and instability. Pt maintains x 2 bouts up to 2 minutes each with seated rest break between bouts.                           AM-PAC Score  AM-PAC Inpatient Mobility Raw Score : 10 (10/03/21 1617)  AM-PAC Inpatient T-Scale Score : 32.29 (10/03/21 1617)  Mobility Inpatient CMS 0-100% Score: 76.75 (10/03/21 1617)  Mobility Inpatient CMS G-Code Modifier : CL (10/03/21 1617)          Goals  Short term goals  Time Frame for Short term goals: 1 week (10/7) unless otherwise specified  Short term goal 1: pt to perform bed mob Supervised -- 10/03: Santa Client supine to/from sit  Short term goal 2: pt to perform transfers CG -- 10/03: Santa Client sit to/from stand with RW  Short term goal 3: pt to amb with least restrictive or no device 50 ft CG -- 10/03: TRISTEN  Short term goal 4: pt to participate in LE Ex 8-10 reps by 10/5 -- 10/03: DNT  Patient Goals   Patient goals : Pt is confused, does not state goals    Plan    Plan  Times per week: 3-5 x wk  Times per day: Daily  Specific instructions for Next Treatment: Progress mobility as tolerated  Current Treatment Recommendations: Strengthening, Transfer Training, Endurance Training, Cognitive Reorientation, Balance Training, Gait Training, Functional Mobility Training, Safety Education & Training, Neuromuscular Re-education, Home Exercise Program, Patient/Caregiver Education & Training, Positioning  Safety Devices  Type of devices: All fall risk precautions in place, Gait belt, Bed alarm in place, Patient at risk for falls, Nurse notified, Call light within reach, Left in bed, Telesitter in use     Therapy Time   Individual Concurrent Group Co-treatment   Time In 1135         Time Out 1202         Minutes 27         Timed Code Treatment Minutes: 27 Minutes       If pt is unable to be seen after this session, please let this note serve as discharge summary. Please see case management note for discharge disposition. Thank you.     Ugo Duran, PT, DPT

## 2021-10-03 NOTE — PROGRESS NOTES
For patient safety, an AVASYS camera has been placed in patient's room. AVASYS monitoring needed for Confusion and Increased Fall Risk. Family/Healthcare Decision Maker notified of use of remote monitoring upon implementation of camera and verbalized understanding.

## 2021-10-03 NOTE — PROGRESS NOTES
Nightly    Vitamin D  2,000 Units Oral Daily     PRN Meds: regadenoson, sodium chloride flush, sodium chloride, magnesium sulfate, promethazine **OR** ondansetron, senna, perflutren lipid microspheres, glucose, dextrose, glucagon (rDNA), dextrose      Intake/Output Summary (Last 24 hours) at 10/3/2021 0918  Last data filed at 10/2/2021 1135  Gross per 24 hour   Intake 240 ml   Output --   Net 240 ml       Physical Exam Performed:    /78   Pulse 73   Temp 97.8 °F (36.6 °C) (Axillary)   Resp 20   Ht 5' 8\" (1.727 m)   Wt 132 lb 4.4 oz (60 kg)   SpO2 94%   BMI 20.11 kg/m²     General appearance: No apparent distress, appears stated age and cooperative. HEENT: Pupils equal, round, and reactive to light. Conjunctivae/corneas clear. Neck: Supple, with full range of motion. No jugular venous distention. Trachea midline. Respiratory:  Normal respiratory effort. Clear to auscultation, bilaterally without Rales/Wheezes/Rhonchi. Cardiovascular: SELIN, RRR  Abdomen: Soft, non-tender, non-distended with normal bowel sounds. Musculoskeletal: LLE 1+edema. Full range of motion without deformity. Skin: Skin color-pale, texture, turgor normal.  No rashes or lesions. Neurologic:  Neurovascularly intact without any focal sensory/motor deficits. Cranial nerves: II-XII intact, grossly non-focal.  Psychiatric: Alert and oriented x1, poor insight  Capillary Refill: Brisk,3 seconds, normal   Peripheral Pulses: +2 palpable, equal bilaterally       Labs:   Recent Labs     10/01/21  0623 10/01/21  1100 10/01/21  1600 10/02/21  0654 10/03/21  0612   WBC 4.8  --   --  5.1 4.5   HGB 9.0*   < > 9.1* 9.1* 9.1*   HCT 26.7*   < > 27.0* 27.8* 26.9*   *  --   --  131* 127*    < > = values in this interval not displayed.      Recent Labs     10/01/21  0623 10/02/21  0654 10/03/21  0612    144 143   K 4.6 3.9 3.8   * 114* 114*   CO2 18* 17* 18*   BUN 44* 41* 37*   CREATININE 3.0* 3.1* 3.1*   CALCIUM 8.5 8.5 8.5 Recent Labs     09/30/21  1437 10/01/21  0623   AST 12* 11*   ALT 12 10   BILITOT 0.4 0.3   ALKPHOS 75 71     No results for input(s): INR in the last 72 hours. Recent Labs     09/30/21  1437 09/30/21  2253 10/01/21  0623   TROPONINI 0.10* 0.09* 0.08*       Urinalysis:      Lab Results   Component Value Date    NITRU Negative 09/30/2021    WBCUA 21-50 09/30/2021    BACTERIA 3+ 09/30/2021    RBCUA 5-10 09/30/2021    BLOODU SMALL 09/30/2021    SPECGRAV 1.015 09/30/2021    GLUCOSEU Negative 09/30/2021       Radiology:  VL Extremity Venous Left   Final Result      XR CHEST PORTABLE   Final Result   1. Congestive heart failure. US RETROPERITONEAL COMPLETE   Final Result   Grossly unremarkable ultrasound of the kidneys and urinary bladder. No   hydronephrosis. CT HEAD WO CONTRAST   Final Result   No acute intracranial abnormality.       Small right mastoid effusion         MRI BRAIN WO CONTRAST    (Results Pending)   NM Cardiac Stress Test Nuclear Imaging    (Results Pending)           Assessment/Plan:    Active Hospital Problems    Diagnosis     Elevated troponin [R77.8]     Cardiomyopathy ([facility] [address] [I42.9]     Normocytic anemia [D64.9]     Acute renal failure superimposed on chronic kidney disease (HCC) [N17.9, N18.9]     Acute UTI [N39.0]     Acute encephalopathy [G93.40]     DM (diabetes mellitus), type 2 ([address] [E11.9]     Hypertension [I10]     Hyperlipidemia [E78.5]     CAD (coronary artery disease) [I25.10]      Acute encephalopathy  -Admit to telemetry with Q4H neuro checks scheduled overnight  -Aspiration precautions and fall protocol in place  -Thyroid studies wnl, lactate 0.9, ammonia level-12, B12/folate wnl  -MRI of the head: unable to obtain d/t sternal wires  -PT/OT/SLP consultation placed  -NEURO consult placed for further recommendations     Acute UTI/ possible mastoiditis  -Patient admitted to telemetry floor with appropriate isolation measures in place  -Crystalloid IVF resuscitation initiated in ED, with maintenance fluids to infuse overnight; strict I's and O's  -Blood, urine cultures ngtd  -Patient initiated on IV Zyvox (positive ADRIAN) and rocephin empirically  -Serial CBC, CMP, lactate, procalcitonin, D-dimer to monitor treatment progression     ADRIAN on CKD  -Patient admitted to telemetry floor with strict I's and O's during stay  -Patient aggressively hydrated in ED with NS, with maintenance fluids continued overnight  -Straight cath for urinary retention greater than 300 cc  -Urine spot protein, microalbumin, FENa studies   -Complete renal ultrasound to assess for renal atrophy/LUTS-stable  -Nephrotoxic medications held, including home dosages of ACE-RI, diuretics, and NSAIDs  -Consultation placed to NEPHRO for further recommendations     BRBPR with anemia  -Admit to telemetry for continuous cardiac monitoring  -Type and screen performed with plans for PRBC transfusion for Hgb < 7  -Protonix 40 mg po daily  -Consultation placed to GI   -Advance diet     CAD with elevated trop/ acute sHF  -Admit to telemetry floor with serial cardiac enzymes to be obtained overnight  -ECHO: EF 15-20%, Gr II DD, mod mr, mild ar/tr, mild pulm htn  -Continue Coreg and Zocor; ASA   -Consultation placed to cardiologist for further recommendations: plan for lexiscan myoview     -7/1/2021 echo:Summary:   Mild aortic root dilatation. There is mild mitral regurgitation. The left ventricular function is low normal.   Overall left ventricular ejection fraction is estimated to be 50-55%. There is borderline global hypokinesis of the left ventricle.    Right ventricular systolic pressure is normal at <35 mmHg.        Type 2 DM  -A1c 5.9  -Home hypoglycemic medications placed on temporary hold  -Continue home dose Levemir  -Humalog medium dose SSI scheduled before meals and at bedtime based on POC glucose  -Carbohydrate restriction placed on diet    DVT Prophylaxis: scds  Diet: Diet NPO  Code Status: Full Code    PT/OT Eval Status: consulted    Dispo - pending course    Lakshmi Viramontes, APRN - CNP

## 2021-10-03 NOTE — PROGRESS NOTES
[facility]    [facility]              [phone]                         Interval History and plan:      Seen and examined at bedside  Awake and alert, interactive. He has no complaints. No urinary symptoms. /62  Sating well on room air. Labs reviewed. UO not recorded. Bicarb 17 > 18   BUN 41 > 37   Cr 3.1 > 3.1      Plan:  Renal function near baseline  No edema, on room air. Can hold diuretics. Appears euvolemic. Bladder scan q shift ensure no retention. Assessment :     Acute Kidney Injury on CKD 4  ADRIAN likely due to -prerenal etiology  Cr on consultation 3.3  Baseline Cr- 3  CKD likely due to diabetes hypertension recurrent ADRIAN  He was [facility] 6/29- 7/8/21- with ADRIAN  Cr at DC was 3.38 from peak of 4.75(pre-renal)  UA- small blood, small nitrite, wbc 21-50  Renal Imaging:-10/21-right kidney 8.2 cm, left 8.3 cm, no hydronephrosis  Echo: pending  7/21- nromal EF, RV pressure < 35 mmHg      Hypertension   BP: (126-137)/(62-78)  Pulse:  [65-73]   BP goal inpatient 164-536 systolic inpatient    Anemia of CKD  Iron sat normal  Hb 9.5 on consult    Acidosis  Oral bicarb ok    BPH  History of renal stones  CAD  Carotid artery occlusion  Diabetes mellitus        [address] [facility] would like to thank Klever Martinez MD   for opportunity to serve this patient      Please call with questions at-   24 Hrs Answering service [phone] or  7 am- 5 pm via Perfect serve or cell phone  Vasile Hanna MD          CC/reason for consult :     ADRIAN     HPI :     Kalen Vázquez is a 78 y.o. male presented to   the hospital on 9/30/2021 with altered mental status from nursing facility. He is unable to provide details. Per reports he has had bright red blood per rectum. He was brought to the emergency room where work-up showed possible inferolateral ischemia on the heart and had QTc prolongation.   CT scan of the head was done which was negative. He is scheduled to do MRI but has not done yet. He was found to have metabolic acidosis, ADRIAN. Also elevation of troponin. He had a UA done which showed possible infection, culture is pending also blood culture is sent and results are pending. He is currently on IV antibiotics. We are consulted for ADRIAN and related issues    ROS:     Positives Listed Bold. All other remaining systems are negative. Constitutional:  fever, chills, weakness, weight change, fatigue,      Skin:  rash, pruritus, hair loss, bruising, dry skin, petechiae. Head, Face, Neck   headaches, swelling,  cervical adenopathy. Respiratory: shortness of breath, cough, or wheezing  Cardiovascular: chest pain, palpitations, dizzy, edema  Gastrointestinal: nausea, vomiting, diarrhea, constipation,belly pain    Yellow skin, blood in stool  Musculoskeletal:  back pain, muscle weakness, gait problems,       joint pain or swelling. Genitourinary:  dysuria, poor urine flow, flank pain, blood in urine  Neurologic:  vertigo, TIA'S, syncope, seizures, focal weakness  Psychosocial:  insomnia, anxiety, or depression. Additional positive findings: -         PMH/PSH/SH/Family History:     Past Medical History:   Diagnosis Date    CAD (coronary artery disease)     Chronic renal disease     Dr. Sharee Coffman Hyperlipidemia     Hypertension     [facility]inopathy due to secondary DM ([facility] [address]     Type II or unspecified type diabetes mellitus without mention of complication, not stated as uncontrolled        Past Surgical History:   Procedure Laterality Date    CARDIAC SURGERY  2006    quad by pass    COLONOSCOPY      pt refuses to have one    EYE SURGERY Bilateral 2003   [address] LUNG SURGERY  2006    scraped lungs        reports that he has never smoked. He has never used smokeless tobacco. He reports that he does not drink alcohol and does not use drugs. family history includes Diabetes in his mother. Medication:     Current Facility-Administered Medications: heparin (porcine) injection 5,000 Units, 5,000 Units, SubCUTAneous, 3 times per day  regadenoson (LEXISCAN) injection 0.4 mg, 0.4 mg, IntraVENous, ONCE PRN  pantoprazole (PROTONIX) injection 40 mg, 40 mg, IntraVENous, Daily  linezolid (ZYVOX) IVPB 600 mg, 600 mg, IntraVENous, Q12H  cefTRIAXone (ROCEPHIN) 1000 mg IVPB in 50 mL D5W minibag, 1,000 mg, IntraVENous, Q24H  carvedilol (COREG) tablet 25 mg, 25 mg, Oral, BID WC  insulin glargine (LANTUS) injection vial 14 Units, 14 Units, SubCUTAneous, Nightly  atorvastatin (LIPITOR) tablet 20 mg, 20 mg, Oral, Daily  aspirin EC tablet 81 mg, 81 mg, Oral, Daily  sodium chloride flush 0.9 % injection 10 mL, 10 mL, IntraVENous, 2 times per day  sodium chloride flush 0.9 % injection 10 mL, 10 mL, IntraVENous, PRN  0.9 % sodium chloride infusion, 25 mL, IntraVENous, PRN  magnesium sulfate 2000 mg in 50 mL IVPB premix, 2,000 mg, IntraVENous, PRN  promethazine (PHENERGAN) tablet 12.5 mg, 12.5 mg, Oral, Q6H PRN **OR** ondansetron (ZOFRAN) injection 4 mg, 4 mg, IntraVENous, Q6H PRN  senna (SENOKOT) tablet 8.6 mg, 1 tablet, Oral, Daily PRN  perflutren lipid microspheres (DEFINITY) injection 1.65 mg, 1.5 mL, IntraVENous, ONCE PRN  insulin lispro (HUMALOG) injection vial 0-12 Units, 0-12 Units, SubCUTAneous, TID WC  insulin lispro (HUMALOG) injection vial 0-6 Units, 0-6 Units, SubCUTAneous, Nightly  glucose (GLUTOSE) 40 % oral gel 15 g, 15 g, Oral, PRN  dextrose 50 % IV solution, 12.5 g, IntraVENous, PRN  glucagon (rDNA) injection 1 mg, 1 mg, IntraMUSCular, PRN  dextrose 5 % solution, 100 mL/hr, IntraVENous, PRN  Vitamin D (CHOLECALCIFEROL) tablet 2,000 Units, 2,000 Units, Oral, Daily       Vitals :     Vitals:    10/03/21 1137   BP: 126/62   Pulse: 65   Resp:    Temp:    SpO2: 100%       I & O :     No intake or output data in the 24 hours ending 10/03/21 1312     Physical Examination :     General appearance: male in NAD, alert and awake. HEENT: EOM intact, no icterus. Trachea is midline. Neck : No mass, appears symmetrical, no JVD   Respiratory: Respiratory effort appears normal, no wheeze, no crackles  Cardiovascular: Ausculation- No M/R/G, no edema  Abdomen: No visible mass or tenderness  Musculoskeletal:  No clubbing,cyanosis, joints with no swelling or deformity. Skin:no rashes, ulcers, induration, no jaundice. Neuro: face symmetric, no focal deficits. Appropriate responses. CN 2-12 grossly intact    Additional findings:-        LABS:     Recent Labs     10/01/21  0623 10/01/21  1100 10/01/21  1600 10/02/21  0654 10/03/21  0612   WBC 4.8  --   --  5.1 4.5   HGB 9.0*   < > 9.1* 9.1* 9.1*   HCT 26.7*   < > 27.0* 27.8* 26.9*   *  --   --  131* 127*    < > = values in this interval not displayed.      Recent Labs     10/01/21  0623 10/02/21  0654 10/03/21  0612    144 143   K 4.6 3.9 3.8   * 114* 114*   CO2 18* 17* 18*   BUN 44* 41* 37*   CREATININE 3.0* 3.1* 3.1*   GLUCOSE 133* 112* 92

## 2021-10-03 NOTE — PROGRESS NOTES
PROGRESS NOTE  S:76 yrs Patient  admitted on 9/30/2021 with Acute encephalopathy [G93.40]  Urinary tract infection without hematuria, site unspecified [N39.0]  Altered mental status, unspecified altered mental status type [R41.82]  Acute renal failure superimposed on chronic kidney disease, unspecified CKD stage, unspecified acute renal failure type ([facility] [address] [N17.9, N18.9] . No active bleeding per nursing    Current Hospital Schedued Meds   [START ON 10/3/2021] pantoprazole  40 mg IntraVENous Daily    linezolid  600 mg IntraVENous Q12H    cefTRIAXone (ROCEPHIN) IV  1,000 mg IntraVENous Q24H    carvedilol  25 mg Oral BID WC    insulin glargine  14 Units SubCUTAneous Nightly    atorvastatin  20 mg Oral Daily    aspirin  81 mg Oral Daily    sodium chloride flush  10 mL IntraVENous 2 times per day    insulin lispro  0-12 Units SubCUTAneous TID WC    insulin lispro  0-6 Units SubCUTAneous Nightly    Vitamin D  2,000 Units Oral Daily     Current Hospital IV Meds   sodium chloride Stopped (10/02/21 0344)    dextrose       Current Hospital PRN Meds  regadenoson, sodium chloride flush, sodium chloride, magnesium sulfate, promethazine **OR** ondansetron, senna, perflutren lipid microspheres, glucose, dextrose, glucagon (rDNA), dextrose    Exam:   Vitals:    10/02/21 1630   BP: (!) 141/74   Pulse: 68   Resp: 20   Temp: 97.7 °F (36.5 °C)   SpO2:      I/O last 3 completed shifts:   In: 1345.7 [P.O.:360; I.V.:36.2; IV Piggyback:949.5]  Out: 375 [Urine:375]   General appearance: alert, appears stated age and cooperative  HEENT: PERRLA  Neck: no adenopathy, no carotid bruit, no JVD, supple, symmetrical, trachea midline and thyroid not enlarged, symmetric, no tenderness/mass/nodules  Lungs: clear to auscultation bilaterally  Heart: regular rate and rhythm, S1, S2 normal, no murmur, click, rub or gallop  Abdomen: soft, non-tender; bowel sounds normal; no masses,  no organomegaly  Extremities: extremities normal, atraumatic, no cyanosis or edema     Labs:  CBC:   Recent Labs     09/30/21  1437 09/30/21  1437 10/01/21  0623 10/01/21  0623 10/01/21  1100 10/01/21  1600 10/02/21  0654   WBC 4.7  --  4.8  --   --   --  5.1   HGB 9.3*   < > 9.0*   < > 9.5* 9.1* 9.1*   HCT 28.1*   < > 26.7*   < > 27.9* 27.0* 27.8*   MCV 98.8  --  95.8  --   --   --  97.4   *  --  127*  --   --   --  131*    < > = values in this interval not displayed. BMP:   Recent Labs     09/30/21  1437 10/01/21  0623 10/02/21  0654    145 144   K 4.4 4.6 3.9   * 113* 114*   CO2 20* 18* 17*   BUN 50* 44* 41*   CREATININE 3.4* 3.0* 3.1*     LIVER PROFILE:   Recent Labs     09/30/21  1437 10/01/21  0623   AST 12* 11*   ALT 12 10   PROT 5.7* 5.7*   BILITOT 0.4 0.3   ALKPHOS 75 71     PT/INR: No results for input(s): INR in the last 72 hours. Invalid input(s): PT    IMAGING:  Echo Complete    Result Date: 10/1/2021  Transthoracic Echocardiography Report (TTE)  Demographics   Patient Name       Edger Records   Date of Study      10/01/2021         Gender               Male   Patient Number     [id number]         Date of Birth        1945   Visit Number       [id number]          Age                  78 year(s)   Accession Number   [id number]         Room Number          9389   Corporate ID       [id number]           Sonographer          Sharif Gallagher,    Ordering Physician Julia Gay,  Interpreting         [address] [place] [address].                     MD                 Physician            Annita HOSKINS MD  Procedure Type of Study   TTE procedure:ECHOCARDIOGRAM COMPLETE 2D W DOPPLER W COLOR. Procedure Date Date: 10/01/2021 Start: 08:23 AM Study Location: [address] [place] [address] - Echo Lab Technical Quality: Adequate visualization Indications:Abnormal ECG.  Patient Status: Routine Height: 68 inches Weight: 150 pounds BSA: 1.81 m2 BMI: 22.81 kg/m2 HR: 84 bpm BP: 138/71 mmHg  Conclusions   Summary The left ventricular systolic function is severely reduced with an ejection  fraction of 15-20 %. There is global with regional variations of wall motion, inferior wall moves  best  Grade II diastolic dysfunction with elevated filling pressure. Moderate mitral regurgitation. Mild aortic, tricuspid and aortic regurgitation. Right ventricular systolic function is moderately reduced . Systolic pulmonic artery pressure (SPAP) is estimated at 48 mmHg consistent  with mild pulmonary hypertension (Right atrial pressure of 8 mmHg). Signature   ------------------------------------------------------------------  Electronically signed by Luther Malagon MD (Interpreting  physician) on 10/01/2021 at 02:39 PM  ------------------------------------------------------------------   Findings   Left Ventricle  The left ventricular systolic function is severely reduced with an ejection  fraction of 15-20 %. There is global with regional variations of wall motion, inferior wall moves  best  Normal left ventricle size and wall thickness. Grade II diastolic dysfunction with elevated filling pressure. Mitral Valve  Mild thickening of leaflets of mitral valve. No mitral stenosis. Moderate mitral regurgitation. Left Atrium  The left atrium is normal in size. Aortic Valve  Aortic valve appears sclerotic but opens adequately. Mild aortic regurgitation. Aorta  The aortic root is normal in size. Right Ventricle  Normal right ventricular size. Right ventricular systolic function is moderately reduced . TAPSE is measured at 11 mm. S' prime velocity is measured at 7 cm/s. Tricuspid Valve  Tricuspid valve is structurally normal.  Mild tricuspid regurgitation. Systolic pulmonic artery pressure (SPAP) is estimated at 48 mmHg consistent  with mild pulmonary hypertension (Right atrial pressure of 8 mmHg). Right Atrium  The right atrium is normal in size at 13 cm2. Pulmonic Valve  The pulmonic valve is not well visualized. Mild pulmonic regurgitation present. Pericardial Effusion  Trivial pericardial effusion noted. Pleural Effusion  No pleural effusion. Miscellaneous  IVC size is normal (<2.1 cm) but collapses < 50% with respiration consistent  with elevated RA pressure (8 mmHg).   M-Mode/2D Measurements (cm)   LV Diastolic Dimension: 1.34 cm LV Systolic Dimension: 6.15 cm  LV Septum Diastolic: 7.74 cm  LV PW Diastolic: 2.27 cm        AO Root Dimension: 3.1 cm                                  AV Cusp Separation: 2 cm                                  LA Dimension: 3.8 cm                                  LA Area: 19 cm2                                  LA volume/Index: 55.3 ml /31 ml/m2  Doppler Measurements   AV Peak Velocity: 84 cm/s      MV Peak E-Wave: 77.1 cm/s  AV Peak Gradient: 2.82 mmHg    MV Peak A-Wave: 96.4 cm/s                                 MV E/A Ratio: 0.8   TR Velocity:316 cm/s           MV Max P mmHg  TR Gradient:39.94 mmHg         MV Vmax:521 cm/s  Estimated RAP:8 mmHg  Estimated RVSP: 48 mmHg  E' Septal Velocity: 3.59 cm/s  E' Lateral Velocity: 8.05 cm/s  E/E' ratio: 15  PV Peak Velocity: 58.4 cm/s  PV Peak Gradient: 1.36 mmHg   Aortic Valve   Peak Velocity: 84 cm/s  Peak Gradient: 2.82 mmHg   AI P1/2t: 582 msec  Cusp Separation: 2 cm  Aorta   Aortic Root: 3.1 cm      VL Extremity Venous Left    Result Date: 10/1/2021  Lower Extremities DVT Study  Demographics   Patient Name       Amos Dhaliwal   Date of Study      10/01/2021        Gender              Male   Patient Number     [id number]        Date of Birth       1945   Visit Number       [id number]         Age                 78 year(s)   Accession Number   [id number]        Room Number         3892   Corporate ID       [id number]          Sonographer         Roldan Bruno RDMS, RVT   Ordering Physician Bay Valenzuela CNP  Interpreting        [facility] Vascular Physician           Readers                                                           Kelsi Jasso MD  Procedure Type of Study:   Veins:Lower Extremities DVT Study, VL EXTREMITY VENOUS DUPLEX LEFT. Vascular Sonographer Report  Indications for Study:Edema. Additional Indications:Patient is a poor historian; left leg swelling noted by MD. Venous Duplex Scan: B-mode imaging of the deep and superficial veins, with compression maneuvers, including color and Doppler spectral waveform analysis. Impressions Right Impression No evidence of deep vein thrombosis involving the right common femoral vein. Left Impression No evidence of deep vein or superficial vein thrombosis involving the left lower extremity. Evidence of a cystic structure seen in the popliteal space of the left leg measuring 3.8 x 1 cm. Conclusions   Summary   No evidence of deep vein or superficial thrombosis involving the left lower  extremity and the contralateral proximal common femoral vein. Left Baker's cyst.   Signature   ------------------------------------------------------------------  Electronically signed by Christiane Zamarripa MD (Interpreting  physician) on 10/01/2021 at 04:34 PM  ------------------------------------------------------------------  Patient Status:Routine. Study [facility] - Vascular Lab. Technical Quality:Adequate visualization. Risk Factors   - The patient's risk factor(s) include: diabetes mellitus, dyslipidemia and     arterial hypertension. Velocities are measured in cm/s ; Diameters are measured in mm Right Lower Extremities DVT Study Measurements Right 2D Measurements +--------------+----------+---------------+----------+ ! Location      ! Visualized! Compressibility! Thrombosis! +--------------+----------+---------------+----------+ ! Common Femoral!Yes       ! Yes            ! None      ! +--------------+----------+---------------+----------+ Right Doppler Measurements +--------------+---------+------+------------+ ! Location      ! Signal   !Reflux! Reflux (sec)! +--------------+---------+------+------------+ ! Common Femoral!Pulsatile! No    !            ! +--------------+---------+------+------------+ Left Lower Extremities DVT Study Measurements Left 2D Measurements +------------------------+----------+---------------+----------+ ! Location                ! Visualized! Compressibility! Thrombosis! +------------------------+----------+---------------+----------+ ! Sapheno Femoral Junction! Yes       ! Yes            ! None      ! +------------------------+----------+---------------+----------+ ! GSV Thigh               ! Yes       ! Yes            ! None      ! +------------------------+----------+---------------+----------+ ! Common Femoral          !Yes       ! Yes            ! None      ! +------------------------+----------+---------------+----------+ ! Femoral                 !Yes       ! Yes            ! None      ! +------------------------+----------+---------------+----------+ ! Prox Femoral            !Yes       ! Yes            ! None      ! +------------------------+----------+---------------+----------+ ! Mid Femoral             !Yes       ! Yes            ! None      ! +------------------------+----------+---------------+----------+ ! Dist Femoral            !Yes       ! Yes            ! None      ! +------------------------+----------+---------------+----------+ ! Deep Femoral            !Yes       ! Yes            ! None      ! +------------------------+----------+---------------+----------+ ! Popliteal               !Yes       ! Yes            ! None      ! +------------------------+----------+---------------+----------+ ! GSV Below Knee          ! Yes       ! Yes            ! None      ! +------------------------+----------+---------------+----------+ ! Gastroc                 ! Yes       ! Yes            ! None      ! +------------------------+----------+---------------+----------+ ! Soleal                  !Yes       ! Yes            ! None      ! +------------------------+----------+---------------+----------+ ! PTV                     ! Yes       ! Yes            ! None      ! +------------------------+----------+---------------+----------+ ! Peroneal                !Yes       ! Yes            ! None      ! +------------------------+----------+---------------+----------+ ! GSV Calf                ! Yes       ! Yes            ! None      ! +------------------------+----------+---------------+----------+ ! SSV                     ! Yes       ! Yes            ! None      ! +------------------------+----------+---------------+----------+ Left Doppler Measurements +------------------------+---------+------+------------+ ! Location                ! Signal   !Reflux! Reflux (sec)! +------------------------+---------+------+------------+ ! Sapheno Femoral Junction! Pulsatile! No    !            ! +------------------------+---------+------+------------+ ! Common Femoral          !Pulsatile! No    !            ! +------------------------+---------+------+------------+ ! Femoral                 !Pulsatile! No    !            ! +------------------------+---------+------+------------+ ! Deep Femoral            !Pulsatile! No    !            ! +------------------------+---------+------+------------+ ! Popliteal               !Pulsatile! No    !            ! +------------------------+---------+------+------------+    XR CHEST PORTABLE    Result Date: 10/1/2021  EXAMINATION: ONE XRAY VIEW OF THE CHEST 10/1/2021 12:24 pm COMPARISON: None available.  HISTORY: ORDERING SYSTEM PROVIDED HISTORY: to determine whether there are pacing wires for MRI TECHNOLOGIST PROVIDED HISTORY: Reason for exam:->to determine whether there are pacing wires for MRI Reason for Exam: to determine whether there are pacing wires for MRI Acuity: Acute Type of Exam: Initial FINDINGS: There is moderate pulmonary vascular congestion as well as a small to moderate right pleural effusion. The cardiac silhouette is enlarged status post median sternotomy and CABG. 2 wires are coiled overlying the left hemithorax. There is no pneumothorax. 1. Congestive heart failure. US RETROPERITONEAL COMPLETE    Result Date: 10/1/2021  EXAMINATION: RETROPERITONEAL ULTRASOUND OF THE KIDNEYS AND URINARY BLADDER 10/1/2021 COMPARISON: None HISTORY: ORDERING SYSTEM PROVIDED HISTORY: ADRIAN on CKD; R/O LUTS or atrophy TECHNOLOGIST PROVIDED HISTORY: Reason for exam:->ADRIAN on CKD; R/O LUTS or atrophy 81-year-old female with acute on chronic kidney disease FINDINGS: Kidneys: Right kidney measures 8.2 x 4.3 x 3.8 cm. Right renal cortical thickness measures 1.4 cm. Left kidney measures 8.3 x 4.2 x 3.5 cm. Left renal cortical thickness measures 1 cm. No hydronephrosis or perinephric fluid. No echogenic foci with posterior acoustic shadowing to suggest renal calculi. Gross preservation of the bilateral corticomedullary differentiation. Bladder: Slightly filled urinary bladder. Grossly unremarkable ultrasound of the kidneys and urinary bladder. No hydronephrosis. Hospital Problems         Last Modified POA    * (Principal) Acute encephalopathy 9/30/2021 Yes    CAD (coronary artery disease) 9/30/2021 Yes    DM (diabetes mellitus), type 2 ([address] 9/30/2021 Yes    Hyperlipidemia 9/30/2021 Yes    Hypertension 9/30/2021 Yes    Acute renal failure superimposed on chronic kidney disease ([address] 10/1/2021 Yes    Acute UTI 9/30/2021 Yes    Elevated troponin 10/1/2021 Yes    Cardiomyopathy ([address] 10/1/2021 Yes    Normocytic anemia 10/1/2021 Yes               Impression:  81-year-old male with coronary disease diabetes hyperlipidemia hypertension and urinary tract infection is admitted with confusion    Recommendation:  1. Hemoglobin stable, no gross bleeding. Will continue observation  2. Continue aspirin  3.  Call with questions or ingrid Smith DO  8:46 PM 10/2/2021            [address] [place] [address]    [address]      [address] [facility]     Phone: [phone]     Fax: [phone]

## 2021-10-04 ENCOUNTER — APPOINTMENT (OUTPATIENT)
Dept: NUCLEAR MEDICINE | Age: 76
DRG: 673 | End: 2021-10-04
Payer: MEDICARE

## 2021-10-04 LAB
A/G RATIO: 1.5 (ref 1.1–2.2)
ALBUMIN SERPL-MCNC: 3.1 G/DL (ref 3.4–5)
ALP BLD-CCNC: 66 U/L (ref 40–129)
ALT SERPL-CCNC: 8 U/L (ref 10–40)
ANION GAP SERPL CALCULATED.3IONS-SCNC: 10 MMOL/L (ref 3–16)
AST SERPL-CCNC: 11 U/L (ref 15–37)
BASOPHILS ABSOLUTE: 0.1 K/UL (ref 0–0.2)
BASOPHILS RELATIVE PERCENT: 1.1 %
BILIRUB SERPL-MCNC: 0.3 MG/DL (ref 0–1)
BLOOD CULTURE, ROUTINE: NORMAL
BUN BLDV-MCNC: 33 MG/DL (ref 7–20)
CALCIUM SERPL-MCNC: 8.3 MG/DL (ref 8.3–10.6)
CHLORIDE BLD-SCNC: 111 MMOL/L (ref 99–110)
CO2: 19 MMOL/L (ref 21–32)
CREAT SERPL-MCNC: 3.4 MG/DL (ref 0.8–1.3)
CULTURE, BLOOD 2: NORMAL
EOSINOPHILS ABSOLUTE: 0.6 K/UL (ref 0–0.6)
EOSINOPHILS RELATIVE PERCENT: 9.9 %
FERRITIN: 566.8 NG/ML (ref 30–400)
GFR AFRICAN AMERICAN: 22
GFR NON-AFRICAN AMERICAN: 18
GLOBULIN: 2.1 G/DL
GLUCOSE BLD-MCNC: 107 MG/DL (ref 70–99)
GLUCOSE BLD-MCNC: 145 MG/DL (ref 70–99)
GLUCOSE BLD-MCNC: 171 MG/DL (ref 70–99)
GLUCOSE BLD-MCNC: 47 MG/DL (ref 70–99)
GLUCOSE BLD-MCNC: 90 MG/DL (ref 70–99)
HCT VFR BLD CALC: 27.5 % (ref 40.5–52.5)
HEMOGLOBIN: 9.3 G/DL (ref 13.5–17.5)
LV EF: 32 %
LVEF MODALITY: NORMAL
LYMPHOCYTES ABSOLUTE: 0.9 K/UL (ref 1–5.1)
LYMPHOCYTES RELATIVE PERCENT: 16.4 %
MCH RBC QN AUTO: 33 PG (ref 26–34)
MCHC RBC AUTO-ENTMCNC: 34 G/DL (ref 31–36)
MCV RBC AUTO: 97.1 FL (ref 80–100)
MONOCYTES ABSOLUTE: 0.4 K/UL (ref 0–1.3)
MONOCYTES RELATIVE PERCENT: 7.8 %
NEUTROPHILS ABSOLUTE: 3.7 K/UL (ref 1.7–7.7)
NEUTROPHILS RELATIVE PERCENT: 64.8 %
PDW BLD-RTO: 15.3 % (ref 12.4–15.4)
PERFORMED ON: ABNORMAL
PERFORMED ON: NORMAL
PLATELET # BLD: 138 K/UL (ref 135–450)
PMV BLD AUTO: 8.9 FL (ref 5–10.5)
POTASSIUM REFLEX MAGNESIUM: 3.6 MMOL/L (ref 3.5–5.1)
PROCALCITONIN: 0.2 NG/ML (ref 0–0.15)
RBC # BLD: 2.83 M/UL (ref 4.2–5.9)
SODIUM BLD-SCNC: 140 MMOL/L (ref 136–145)
TOTAL PROTEIN: 5.2 G/DL (ref 6.4–8.2)
WBC # BLD: 5.7 K/UL (ref 4–11)

## 2021-10-04 PROCEDURE — 85025 COMPLETE CBC W/AUTO DIFF WBC: CPT

## 2021-10-04 PROCEDURE — 6370000000 HC RX 637 (ALT 250 FOR IP): Performed by: NURSE PRACTITIONER

## 2021-10-04 PROCEDURE — 6360000002 HC RX W HCPCS: Performed by: INTERNAL MEDICINE

## 2021-10-04 PROCEDURE — 6360000002 HC RX W HCPCS: Performed by: NURSE PRACTITIONER

## 2021-10-04 PROCEDURE — 6370000000 HC RX 637 (ALT 250 FOR IP): Performed by: INTERNAL MEDICINE

## 2021-10-04 PROCEDURE — 6370000000 HC RX 637 (ALT 250 FOR IP): Performed by: HOSPITALIST

## 2021-10-04 PROCEDURE — 51798 US URINE CAPACITY MEASURE: CPT

## 2021-10-04 PROCEDURE — 6360000002 HC RX W HCPCS: Performed by: HOSPITALIST

## 2021-10-04 PROCEDURE — 1200000000 HC SEMI PRIVATE

## 2021-10-04 PROCEDURE — 93017 CV STRESS TEST TRACING ONLY: CPT

## 2021-10-04 PROCEDURE — 51701 INSERT BLADDER CATHETER: CPT

## 2021-10-04 PROCEDURE — 84145 PROCALCITONIN (PCT): CPT

## 2021-10-04 PROCEDURE — 99232 SBSQ HOSP IP/OBS MODERATE 35: CPT | Performed by: NURSE PRACTITIONER

## 2021-10-04 PROCEDURE — 2500000003 HC RX 250 WO HCPCS: Performed by: HOSPITALIST

## 2021-10-04 PROCEDURE — A9502 TC99M TETROFOSMIN: HCPCS | Performed by: INTERNAL MEDICINE

## 2021-10-04 PROCEDURE — 82728 ASSAY OF FERRITIN: CPT

## 2021-10-04 PROCEDURE — 78452 HT MUSCLE IMAGE SPECT MULT: CPT

## 2021-10-04 PROCEDURE — 36415 COLL VENOUS BLD VENIPUNCTURE: CPT

## 2021-10-04 PROCEDURE — 3430000000 HC RX DIAGNOSTIC RADIOPHARMACEUTICAL: Performed by: INTERNAL MEDICINE

## 2021-10-04 PROCEDURE — 99233 SBSQ HOSP IP/OBS HIGH 50: CPT | Performed by: NURSE PRACTITIONER

## 2021-10-04 PROCEDURE — 2580000003 HC RX 258: Performed by: HOSPITALIST

## 2021-10-04 PROCEDURE — 80053 COMPREHEN METABOLIC PANEL: CPT

## 2021-10-04 RX ORDER — TAMSULOSIN HYDROCHLORIDE 0.4 MG/1
0.4 CAPSULE ORAL DAILY
Status: DISCONTINUED | OUTPATIENT
Start: 2021-10-04 | End: 2021-10-20 | Stop reason: HOSPADM

## 2021-10-04 RX ORDER — ISOSORBIDE DINITRATE 10 MG/1
10 TABLET ORAL 3 TIMES DAILY
Status: DISCONTINUED | OUTPATIENT
Start: 2021-10-04 | End: 2021-10-20 | Stop reason: HOSPADM

## 2021-10-04 RX ORDER — HYDRALAZINE HYDROCHLORIDE 25 MG/1
25 TABLET, FILM COATED ORAL EVERY 8 HOURS SCHEDULED
Status: DISCONTINUED | OUTPATIENT
Start: 2021-10-04 | End: 2021-10-05

## 2021-10-04 RX ADMIN — REGADENOSON 0.4 MG: 0.08 INJECTION, SOLUTION INTRAVENOUS at 13:06

## 2021-10-04 RX ADMIN — HEPARIN SODIUM 5000 UNITS: 5000 INJECTION INTRAVENOUS; SUBCUTANEOUS at 05:47

## 2021-10-04 RX ADMIN — ISOSORBIDE DINITRATE 10 MG: 10 TABLET ORAL at 16:38

## 2021-10-04 RX ADMIN — TETROFOSMIN 30.9 MILLICURIE: 1.38 INJECTION, POWDER, LYOPHILIZED, FOR SOLUTION INTRAVENOUS at 13:06

## 2021-10-04 RX ADMIN — HYDRALAZINE HYDROCHLORIDE 25 MG: 25 TABLET, FILM COATED ORAL at 21:08

## 2021-10-04 RX ADMIN — HEPARIN SODIUM 5000 UNITS: 5000 INJECTION INTRAVENOUS; SUBCUTANEOUS at 21:08

## 2021-10-04 RX ADMIN — INSULIN LISPRO 1 UNITS: 100 INJECTION, SOLUTION INTRAVENOUS; SUBCUTANEOUS at 21:10

## 2021-10-04 RX ADMIN — CARVEDILOL 25 MG: 25 TABLET, FILM COATED ORAL at 16:38

## 2021-10-04 RX ADMIN — HYDRALAZINE HYDROCHLORIDE 25 MG: 25 TABLET, FILM COATED ORAL at 16:38

## 2021-10-04 RX ADMIN — LINEZOLID 600 MG: 600 INJECTION, SOLUTION INTRAVENOUS at 11:03

## 2021-10-04 RX ADMIN — Medication 2000 UNITS: at 08:12

## 2021-10-04 RX ADMIN — SODIUM CHLORIDE, PRESERVATIVE FREE 10 ML: 5 INJECTION INTRAVENOUS at 21:39

## 2021-10-04 RX ADMIN — EPOETIN ALFA-EPBX 5000 UNITS: 10000 INJECTION, SOLUTION INTRAVENOUS; SUBCUTANEOUS at 16:37

## 2021-10-04 RX ADMIN — TETROFOSMIN 11.3 MILLICURIE: 1.38 INJECTION, POWDER, LYOPHILIZED, FOR SOLUTION INTRAVENOUS at 11:34

## 2021-10-04 RX ADMIN — ISOSORBIDE DINITRATE 10 MG: 10 TABLET ORAL at 21:08

## 2021-10-04 RX ADMIN — PANTOPRAZOLE SODIUM 40 MG: 40 TABLET, DELAYED RELEASE ORAL at 05:47

## 2021-10-04 RX ADMIN — HEPARIN SODIUM 5000 UNITS: 5000 INJECTION INTRAVENOUS; SUBCUTANEOUS at 16:37

## 2021-10-04 RX ADMIN — DEXTROSE MONOHYDRATE 12.5 G: 25 INJECTION, SOLUTION INTRAVENOUS at 08:10

## 2021-10-04 RX ADMIN — ATORVASTATIN CALCIUM 20 MG: 10 TABLET, FILM COATED ORAL at 08:12

## 2021-10-04 RX ADMIN — ASPIRIN 81 MG: 81 TABLET, COATED ORAL at 08:12

## 2021-10-04 RX ADMIN — TAMSULOSIN HYDROCHLORIDE 0.4 MG: 0.4 CAPSULE ORAL at 16:38

## 2021-10-04 ASSESSMENT — PAIN SCALES - GENERAL
PAINLEVEL_OUTOF10: 0

## 2021-10-04 NOTE — PROGRESS NOTES
PROGRESS NOTE  S:76 yrs Patient  admitted on 9/30/2021 with Acute encephalopathy [G93.40]  Urinary tract infection without hematuria, site unspecified [N39.0]  Altered mental status, unspecified altered mental status type [R41.82]  Acute renal failure superimposed on chronic kidney disease, unspecified CKD stage, unspecified acute renal failure type ([facility] [address] [N17.9, N18.9] .     No active bleeding    Current Hospital Schedued Meds   heparin (porcine)  5,000 Units SubCUTAneous 3 times per day    [START ON 10/4/2021] pantoprazole  40 mg Oral QAM AC    linezolid  600 mg IntraVENous Q12H    cefTRIAXone (ROCEPHIN) IV  1,000 mg IntraVENous Q24H    carvedilol  25 mg Oral BID WC    insulin glargine  14 Units SubCUTAneous Nightly    atorvastatin  20 mg Oral Daily    aspirin  81 mg Oral Daily    sodium chloride flush  10 mL IntraVENous 2 times per day    insulin lispro  0-12 Units SubCUTAneous TID WC    insulin lispro  0-6 Units SubCUTAneous Nightly    Vitamin D  2,000 Units Oral Daily     Current Hospital IV Meds   sodium chloride Stopped (10/02/21 0344)    dextrose       Current Hospital PRN Meds  regadenoson, sodium chloride flush, sodium chloride, magnesium sulfate, promethazine **OR** ondansetron, senna, perflutren lipid microspheres, glucose, dextrose, glucagon (rDNA), dextrose    Exam:   Vitals:    10/03/21 1959   BP: (!) 144/70   Pulse: 70   Resp: 20   Temp: 98 °F (36.7 °C)   SpO2: 94%     I/O last 3 completed shifts:  In: -   Out: 310 [Urine:310]   General appearance: alert, appears stated age and cooperative  HEENT: PERRLA  Neck: no adenopathy, no carotid bruit, no JVD, supple, symmetrical, trachea midline and thyroid not enlarged, symmetric, no tenderness/mass/nodules  Lungs: clear to auscultation bilaterally  Heart: regular rate and rhythm, S1, S2 normal, no murmur, click, rub or gallop  Abdomen: soft, non-tender; bowel sounds normal; no masses,  no organomegaly  Extremities: extremities normal, atraumatic, no cyanosis or edema     Labs:  CBC:   Recent Labs     10/01/21  0623 10/01/21  1100 10/01/21  1600 10/02/21  0654 10/03/21  0612   WBC 4.8  --   --  5.1 4.5   HGB 9.0*   < > 9.1* 9.1* 9.1*   HCT 26.7*   < > 27.0* 27.8* 26.9*   MCV 95.8  --   --  97.4 94.7   *  --   --  131* 127*    < > = values in this interval not displayed. BMP:   Recent Labs     10/01/21  0623 10/02/21  0654 10/03/21  0612    144 143   K 4.6 3.9 3.8   * 114* 114*   CO2 18* 17* 18*   BUN 44* 41* 37*   CREATININE 3.0* 3.1* 3.1*     LIVER PROFILE:   Recent Labs     10/01/21  0623   AST 11*   ALT 10   PROT 5.7*   BILITOT 0.3   ALKPHOS 71     PT/INR: No results for input(s): INR in the last 72 hours. Invalid input(s): PT    IMAGING:  No results found. Hospital Problems         Last Modified POA    * (Principal) Acute encephalopathy 9/30/2021 Yes    CAD (coronary artery disease) 9/30/2021 Yes    DM (diabetes mellitus), type 2 ([address] 9/30/2021 Yes    Hyperlipidemia 9/30/2021 Yes    Hypertension 9/30/2021 Yes    Acute renal failure superimposed on chronic kidney disease ([address] 10/1/2021 Yes    Acute UTI 9/30/2021 Yes    Elevated troponin 10/1/2021 Yes    Cardiomyopathy ([address] 10/1/2021 Yes    Normocytic anemia 10/1/2021 Yes               Impression:  70-year-old male with coronary disease diabetes hyperlipidemia hypertension and urinary tract infection is admitted with confusion    Recommendation:  1. Continues to remain hemodynamically stable  2. Will defer endoscopic evaluation  3.  Will sign off, call with questions or concerns      Camelia Diaz DO  10:00 PM 10/3/2021            [facility]    [address]      [address] [place] [address]     Phone: [phone]     Fax: [phone]

## 2021-10-04 NOTE — PROGRESS NOTES
Hospitalist Progress Note      PCP: Merrill Araujo    Date of Admission: 9/30/2021    Chief Complaint: [address] Course:     H& P reviewed. Subjective:     I came multiple times to see pt but gone for procedure. Discussed with RN       Medications:  Reviewed    Infusion Medications    sodium chloride Stopped (10/02/21 0344)    dextrose       Scheduled Medications    insulin lispro  0-6 Units SubCUTAneous TID WC    insulin lispro  0-3 Units SubCUTAneous Nightly    epoetin long-epbx  5,000 Units SubCUTAneous Weekly    tamsulosin  0.4 mg Oral Daily    heparin (porcine)  5,000 Units SubCUTAneous 3 times per day    pantoprazole  40 mg Oral QAM AC    linezolid  600 mg IntraVENous Q12H    cefTRIAXone (ROCEPHIN) IV  1,000 mg IntraVENous Q24H    carvedilol  25 mg Oral BID WC    [Held by provider] insulin glargine  14 Units SubCUTAneous Nightly    atorvastatin  20 mg Oral Daily    aspirin  81 mg Oral Daily    sodium chloride flush  10 mL IntraVENous 2 times per day    Vitamin D  2,000 Units Oral Daily     PRN Meds: regadenoson, sodium chloride flush, sodium chloride, magnesium sulfate, promethazine **OR** ondansetron, senna, perflutren lipid microspheres, glucose, dextrose, glucagon (rDNA), dextrose      Intake/Output Summary (Last 24 hours) at 10/4/2021 1306  Last data filed at 10/4/2021 0555  Gross per 24 hour   Intake --   Output 510 ml   Net -510 ml       Physical Exam Performed:    BP (!) 145/72   Pulse 72   Temp 97.6 °F (36.4 °C) (Axillary)   Resp 16   Ht 5' 8\" (1.727 m)   Wt 133 lb 6.1 oz (60.5 kg)   SpO2 96%   BMI 20.28 kg/m²    Differed as pt not present in room.  Gone for procedure     Labs:   Recent Labs     10/02/21  0654 10/03/21  0612 10/04/21  0816   WBC 5.1 4.5 5.7   HGB 9.1* 9.1* 9.3*   HCT 27.8* 26.9* 27.5*   * 127* 138     Recent Labs     10/02/21  0654 10/03/21  0612 10/04/21  0816    143 140   K 3.9 3.8 3.6   * 114* 111*   CO2 17* 18* 19*   BUN 41* 37* 33*   CREATININE 3.1* 3.1* 3.4*   CALCIUM 8.5 8.5 8.3     Recent Labs     10/04/21  0816   AST 11*   ALT 8*   BILITOT 0.3   ALKPHOS 66     No results for input(s): INR in the last 72 hours. No results for input(s): Alva Parisian in the last 72 hours. Urinalysis:      Lab Results   Component Value Date    NITRU Negative 09/30/2021    WBCUA 21-50 09/30/2021    BACTERIA 3+ 09/30/2021    RBCUA 5-10 09/30/2021    BLOODU SMALL 09/30/2021    SPECGRAV 1.015 09/30/2021    GLUCOSEU Negative 09/30/2021       Radiology:  VL Extremity Venous Left   Final Result      XR CHEST PORTABLE   Final Result   1. Congestive heart failure. US RETROPERITONEAL COMPLETE   Final Result   Grossly unremarkable ultrasound of the kidneys and urinary bladder. No   hydronephrosis. CT HEAD WO CONTRAST   Final Result   No acute intracranial abnormality. Small right mastoid effusion         NM Cardiac Stress Test Nuclear Imaging    (Results Pending)           Assessment/Plan:    Active Hospital Problems    Diagnosis     Elevated troponin [R77.8]     Cardiomyopathy ([facility] [address] [I42.9]     Normocytic anemia [D64.9]     Acute renal failure superimposed on chronic kidney disease (HCC) [N17.9, N18.9]     Acute UTI [N39.0]     Acute encephalopathy [G93.40]     DM (diabetes mellitus), type 2 ([facility] [address] [E11.9]     Hypertension [I10]     Hyperlipidemia [E78.5]     CAD (coronary artery disease) [I25.10]      BRBPR with anemia: no acute GIB noted inpatient with no need for endoscopy at this time  per GI. Continue to follow clinically for any reoccurrence. hgb stable          Acute metab encephalopathy: likely due to UTI, worsening of underlying dementia. Neuro assisting. MRI  Brain-unable to obtain d/t sternal wires. Continue supportive care, avoid sedatives    ADRIAN on CKD : likely prerenal , improved with IVF. nephro assisting. UTI with possible acute mastoditis: received 4 days of IV ceftriaxone and IV zyvox.  UC grew skin/urogenital rikc. Will d/c abx today. DMII : hypoglycemia noted on lab today. Hold lantus, decrease SSI to low dose. Monitor and adjust insulin doses as needed      Cardiomyopathy : Ef 15-20% on echo. continue beta-blocker, no ACE/ARB/ARNI/aldosterone antagonist due to renal insufficiency. CAD with elevated trop : cardio assisting . Stress test         Anemia: likely anemia of chronic disease due to CKD. on retacrit weekly      Urinary retention: likely due to UTI. getting intermittent straight cath. Start flomax         DVT Prophylaxis: scds  Diet: ADULT DIET; Regular; 4 carb choices (60 gm/meal);  Low Sodium (2 gm)  Code Status: Full Code    PT/OT Eval Status: consulted    Dispo -  Possibly in 1-3 days pending clinical course     Naty Matias MD

## 2021-10-04 NOTE — PROGRESS NOTES
[address]   Daily Progress Note    Admit Date:  9/30/2021  HPI:    Chief Complaint   Patient presents with    Altered Mental Status     Pt arrived [facility], mental status change over the past 2 days. Pt is normally alert and oriented per EMS report. Pt now implusive alert only to self. Pt was found to have moderate blood today from rectum.         Interval history: Lexie Gage is being followed for elevated troponin. Presented with altered mental status from nursing home, legally blind. Hx CAD/ CABG, ICM, HYPERTENSION, HLD,CKD, DM, anemia. Subjective:  Mr. Dodge seen upon return from the stress lab. He is awake and alert, oriented to self, person and place though history is inaccurate.  Denies chest pain or shortness of breath       Objective:   BP (!) 141/56   Pulse 74   Temp 97 °F (36.1 °C) (Axillary)   Resp 16   Ht 5' 8\" (1.727 m)   Wt 133 lb 6.1 oz (60.5 kg)   SpO2 100%   BMI 20.28 kg/m²       Intake/Output Summary (Last 24 hours) at 10/4/2021 1603  Last data filed at 10/4/2021 0555  Gross per 24 hour   Intake --   Output 200 ml   Net -200 ml       NYHA: III    Physical Exam:  General:  Awake, alert, NAD  Skin:  Warm and dry  Neck:  JVD<8  Chest:  Clear to auscultation, no wheezes/rhonchi/rales  Telemetry: Sinus rhythm 60 to 70s  Cardiovascular:  RRR S1S2, no m/r/g   Abdomen:  Soft, nontender, +bowel sounds  Extremities: No bilateral lower extremity edema    Medications:    insulin lispro  0-6 Units SubCUTAneous TID WC    insulin lispro  0-3 Units SubCUTAneous Nightly    epoetin long-epbx  5,000 Units SubCUTAneous Weekly    tamsulosin  0.4 mg Oral Daily    heparin (porcine)  5,000 Units SubCUTAneous 3 times per day    pantoprazole  40 mg Oral QAM AC    carvedilol  25 mg Oral BID WC    [Held by provider] insulin glargine  14 Units SubCUTAneous Nightly    atorvastatin  20 mg Oral Daily    aspirin  81 mg Oral Daily    sodium chloride flush  10 mL IntraVENous 2 times per day    Vitamin D  2,000 Units Oral Daily      sodium chloride Stopped (10/02/21 0344)    dextrose         Lab Data:  CBC:   Recent Labs     10/02/21  0654 10/03/21  0612 10/04/21  0816   WBC 5.1 4.5 5.7   HGB 9.1* 9.1* 9.3*   * 127* 138     BMP:    Recent Labs     10/02/21  0654 10/03/21  0612 10/04/21  0816    143 140   K 3.9 3.8 3.6   CO2 17* 18* 19*   BUN 41* 37* 33*   CREATININE 3.1* 3.1* 3.4*     INR:  No results for input(s): INR in the last 72 hours. BNP:  No results for input(s): PROBNP in the last 72 hours. No results found for: LVEF, LVEFMODE    Testing:    Coronary Artery Disease s/p CABG  03/31/2006 Cath: EF=20%. Severe 3 vessel disease. [facility]  04/26/2006 CABG: LIMA-> LAD^Diag. SVG->OM^PDA. [facility]  03/07/2008 MPI: EF.39, No evidence of reversible ischemia  06/11/2013 MPI: EF=45%, inferolateral scar. No ischemia  Cardiomyopathy  03/31/2008 Echo: EF=25-30%, Mild MR, Mild LAE, Global hypokinesis  05/08/2009 Echo: EF=45-50%, mild aortic root dilatation at 4.0cm  05/25/2011 Echo: EF= 45-50%, Grade I DD, Moderate LVH, inferior HK  05/23/2013 Echo: EF=50-55%, aortic root=3.6cm  08/06/2019 Echo: EF=50-55%, grade I DD, mild LAE, mild to mod MR        Echocardiogram 7/1/2021  Summary:   Mild aortic root dilatation. There is mild mitral regurgitation. The left ventricular function is low normal.   Overall left ventricular ejection fraction is estimated to be 50-55%. There is borderline global hypokinesis of the left ventricle. Right ventricular systolic pressure is normal at <35 mmHg. Echo 9/30/21  The left ventricular systolic function is severely reduced with an ejection fraction of 15-20 %. There is global with regional variations of wall motion, inferior wall moves   best.   Grade II diastolic dysfunction with elevated filling pressure. Moderate mitral regurgitation. Mild aortic, tricuspid and aortic regurgitation.    Right ventricular systolic function is moderately reduced . Systolic pulmonic artery pressure (SPAP) is estimated at 48 mmHg consistent with mild pulmonary hypertension (Right atrial pressure of 8mmHg). LEXISCAN NUCLEAR STRESS 10/4/21  Summary    ABNORMAL HIGH RISK STRESS TEST    LV size is dilated and severely reduced systolic function. Left ventricular    ejection fraction of 32%. Moderate global hypokinesis with severe    hypokinesis of the basal and mid inferior wall. There is a large defect in    the basal and mid inferior wall at stress that does not reverse consistent    with scar. No gross ischemia. Principal Problem:    Acute encephalopathy  Active Problems:    CAD (coronary artery disease)    DM (diabetes mellitus), type 2 (HCC)    Hyperlipidemia    Hypertension    Acute renal failure superimposed on chronic kidney disease (HCC)    Acute UTI    Elevated troponin    Cardiomyopathy ([facility] [address]    Normocytic anemia  Resolved Problems:    * No resolved hospital problems. *      Assessment:  Elevated troponin: down trending  Ischemic cardiomyopathy with worsening LVEF down to 15 to 20%  CAD s/p CABG 2006  Diabetes  HLD  CKD stage IV  Carotid stenosis  Anemia    Plan:  1. Stress test with EF 32%, large scar but no ischemia. No cath given no ischemia and CKD4, asymptomatic  2. Continue evidence-based beta blocker Coreg 25 mg bid  3. Add hydralazine and nitrate combination - not able to use ACEi/ARB/ ARNI given A/ CKD  4. Continue ASA and statin  5. No need for diuretic at this time.     6. Needs follow up with primary cardiologist to optimize guideline directed medical therapy      Janessa Bell heart cardiology       Lark Post, APRN - CNP, 10/4/2021, 4:03 PM

## 2021-10-04 NOTE — PLAN OF CARE
Problem: Falls - Risk of:  Goal: Will remain free from falls  Description: Will remain free from falls  Outcome: Ongoing     Bed alarm on & in place. 2/4 side rails up. Pt wearing non skid footwear. Bed locked & in lowest position. Call light within reach. For patient safety, an AVASYS camera has been placed in patient's room. AVASYS monitoring needed for Confusion, Increased Fall Risk, and Delirium. Writer placed call to monitor observer to verify camera number [id number] and review patient name, reason for monitoring, and contact information for primary RN. Patient notified of use of remote monitoring upon implementation of camera and verbalized understanding.

## 2021-10-04 NOTE — PROGRESS NOTES
For patient safety, an AVASYS camera has been placed in patient's room. AVASYS monitoring needed for blind, Confusion, Increased Fall Risk, Pulling at Lines, Substance Withdrawal, Delirium, Elopement Risk and Potential episodes of aggression or violence. Writer placed call to monitor observer to verify camera number  and review patient name, reason for monitoring, and contact information for primary RN. Patient, Family/Healthcare Decision Maker, HCPOA and Legal Guardian notified of use of remote monitoring upon implementation of camera and verbalized understanding.

## 2021-10-04 NOTE — PROGRESS NOTES
A Anna Myoview stress test was completed on this patient as ordered. The patient tolerated the procedure well. Awaiting stress imaging at this time.

## 2021-10-04 NOTE — PLAN OF CARE
Problem: Serum Glucose Level - Abnormal:  Goal: Ability to maintain appropriate glucose levels will improve  Description: Ability to maintain appropriate glucose levels will improve  Outcome: Ongoing     The patient will demonstrate an understanding of s/s & treatment of hypoglycemia, by verbalization,  with the goal of completion at discharge.

## 2021-10-04 NOTE — PROGRESS NOTES
[facility]    [facility]              [phone]                         Interval History and plan:      Creatinine is still low at 3.4  Interactive  GFR is 18 ml/min  Bicarb is 19  No pedal edema and not on oxygen and lying flat on room air  Plan:  Renal function variable but closer to baseline  Hold off on diuretics  No indication for renal replacement therapy at this point but he may need in the future                   Assessment :     Acute Kidney Injury on CKD 4  ADRIAN likely due to -prerenal etiology  Cr on consultation 3.3  Baseline Cr- 3  CKD likely due to diabetes hypertension recurrent ADRIAN  He was admitted to [facility] on 6/29- 7/8/21- with ADRIAN  Cr at DC was 3.38 from peak of 4.75(pre-renal)  UA- small blood, small nitrite, wbc 21-50  Renal Imaging:-10/21-right kidney 8.2 cm, left 8.3 cm, no hydronephrosis  Echo: pending  7/21- nromal EF, RV pressure < 35 mmHg      Hypertension   BP: (132-145)/(71-72)  Pulse:  [72-79]   BP goal inpatient [id number] systolic inpatient    Anemia of CKD  Iron sat normal  Hb 9.5 on consult    Acidosis  Oral bicarb ok    BPH  History of renal stones  CAD  Carotid artery occlusion  Diabetes mellitus        [facility] would like to thank Belinda Jansen MD   for opportunity to serve this patient      Please call with questions at-   24 Hrs Answering service [phone] or  7 am- 5 pm via Perfect serve or cell phone  Cade Davalos MD          CC/reason for consult :     ADRIAN     HPI :     Alma Betancourt is a 78 y.o. male presented to   the hospital on 9/30/2021 with altered mental status from nursing facility. He is unable to provide details. Per reports he has had bright red blood per rectum. He was brought to the emergency room where work-up showed possible inferolateral ischemia on the heart and had QTc prolongation. CT scan of the head was done which was negative. He is scheduled to do MRI but has not done yet.   He was found to have metabolic acidosis, ADRIAN. Also elevation of troponin. He had a UA done which showed possible infection, culture is pending also blood culture is sent and results are pending. He is currently on IV antibiotics. We are consulted for ADRIAN and related issues    ROS:     Positives Listed Bold. All other remaining systems are negative. Constitutional:  fever, chills, weakness, weight change, fatigue,      Skin:  rash, pruritus, hair loss, bruising, dry skin, petechiae. Head, Face, Neck   headaches, swelling,  cervical adenopathy. Respiratory: shortness of breath, cough, or wheezing  Cardiovascular: chest pain, palpitations, dizzy, edema  Gastrointestinal: nausea, vomiting, diarrhea, constipation,belly pain    Yellow skin, blood in stool  Musculoskeletal:  back pain, muscle weakness, gait problems,       joint pain or swelling. Genitourinary:  dysuria, poor urine flow, flank pain, blood in urine  Neurologic:  vertigo, TIA'S, syncope, seizures, focal weakness  Psychosocial:  insomnia, anxiety, or depression. Additional positive findings: -         PMH/PSH/SH/Family History:     Past Medical History:   Diagnosis Date    CAD (coronary artery disease)     Chronic renal disease     Dr. 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 He has never used smokeless tobacco. He reports that he does not drink alcohol and does not use drugs. family history includes Diabetes in his mother.          Medication:     Current Facility-Administered Medications: insulin lispro (HUMALOG) injection vial 0-6 Units, 0-6 Units, SubCUTAneous, TID WC  insulin lispro (HUMALOG) injection vial 0-3 Units, 0-3 Units, SubCUTAneous, Nightly  heparin (porcine) injection 5,000 Units, 5,000 Units, SubCUTAneous, 3 times per day  pantoprazole (PROTONIX) tablet 40 mg, 40 mg, Oral, QAM AC  regadenoson (LEXISCAN) injection 0.4 mg, 0.4 mg, IntraVENous, ONCE PRN  linezolid (ZYVOX) IVPB 600 mg, 600 mg, IntraVENous, Q12H  cefTRIAXone (ROCEPHIN) 1000 mg IVPB in 50 mL D5W minibag, 1,000 mg, IntraVENous, Q24H  carvedilol (COREG) tablet 25 mg, 25 mg, Oral, BID WC  [Held by provider] insulin glargine (LANTUS) injection vial 14 Units, 14 Units, SubCUTAneous, Nightly  atorvastatin (LIPITOR) tablet 20 mg, 20 mg, Oral, Daily  aspirin EC tablet 81 mg, 81 mg, Oral, Daily  sodium chloride flush 0.9 % injection 10 mL, 10 mL, IntraVENous, 2 times per day  sodium chloride flush 0.9 % injection 10 mL, 10 mL, IntraVENous, PRN  0.9 % sodium chloride infusion, 25 mL, IntraVENous, PRN  magnesium sulfate 2000 mg in 50 mL IVPB premix, 2,000 mg, IntraVENous, PRN  promethazine (PHENERGAN) tablet 12.5 mg, 12.5 mg, Oral, Q6H PRN **OR** ondansetron (ZOFRAN) injection 4 mg, 4 mg, IntraVENous, Q6H PRN  senna (SENOKOT) tablet 8.6 mg, 1 tablet, Oral, Daily PRN  perflutren lipid microspheres (DEFINITY) injection 1.65 mg, 1.5 mL, IntraVENous, ONCE PRN  glucose (GLUTOSE) 40 % oral gel 15 g, 15 g, Oral, PRN  dextrose 50 % IV solution, 12.5 g, IntraVENous, PRN  glucagon (rDNA) injection 1 mg, 1 mg, IntraMUSCular, PRN  dextrose 5 % solution, 100 mL/hr, IntraVENous, PRN  Vitamin D (CHOLECALCIFEROL) tablet 2,000 Units, 2,000 Units, Oral, Daily       Vitals :     Vitals:    10/04/21 0813   BP: (!) 145/72   Pulse: 72   Resp: 16   Temp: 97.6 °F (36.4 °C)   SpO2: 96%       I & O :       Intake/Output Summary (Last 24 hours) at 10/4/2021 0958  Last data filed at 10/4/2021 0555  Gross per 24 hour   Intake --   Output 510 ml   Net -510 ml        Physical

## 2021-10-04 NOTE — PROGRESS NOTES
Randee Davis  Neurology Follow-up  [facility] Neurology    Date of Service: 10/4/2021    Subjective:   CC: Follow up today regarding: Acute encephalopathy    Events noted. Chart and lab reviewed. No complaints. Pleasantly confused. Follows simple commands. Planning for stress test today. ROS : A 10-12 system review could not be obtained due to poor cooperation and confusion. family history includes Diabetes in his mother.     Past Medical History:   Diagnosis Date    CAD (coronary artery disease)     Chronic renal disease     Dr. 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 ([facility] [address]     Type II or unspecified type diabetes mellitus without mention of complication, not stated as uncontrolled      Current Facility-Administered Medications   Medication Dose Route Frequency Provider Last Rate Last Admin    insulin lispro (HUMALOG) injection vial 0-6 Units  0-6 Units SubCUTAneous TID  Hermila Kim MD        insulin lispro (HUMALOG) injection vial 0-3 Units  0-3 Units SubCUTAneous Nightly Hermila Kim MD        epoetin long-epbx (RETACRIT) injection 5,000 Units  5,000 Units SubCUTAneous Weekly Regina Veliz MD        tamsulosin (FLOMAX) capsule 0.4 mg  0.4 mg Oral Daily Hermila Kim MD        heparin (porcine) injection 5,000 Units  5,000 Units SubCUTAneous 3 times per day Kait Tomer, APRN - CNP   5,000 Units at 10/04/21 0547    pantoprazole (PROTONIX) tablet 40 mg  40 mg Oral QAM AC Kait Tomer, APRN - CNP   40 mg at 10/04/21 0547    linezolid (ZYVOX) IVPB 600 mg  600 mg IntraVENous Q12H Cassandra Sanchez  mL/hr at 10/04/21 1103 600 mg at 10/04/21 1103    cefTRIAXone (ROCEPHIN) 1000 mg IVPB in 50 mL D5W minibag  1,000 mg IntraVENous Q24H Cassandra Sanchez MD   Stopped at 10/04/21 0005    carvedilol (COREG) tablet 25 mg  25 mg Oral BID  Cassandra Sanchez MD   25 mg at 10/03/21 1603    [Held by provider] insulin glargine (LANTUS) injection vial 14 Units  14 Units SubCUTAneous Nightly Kirk May MD   14 Units at 10/03/21 2113    atorvastatin (LIPITOR) tablet 20 mg  20 mg Oral Daily Kirk May MD   20 mg at 10/04/21 4613    aspirin EC tablet 81 mg  81 mg Oral Daily Kirk May MD   81 mg at 10/04/21 7104    sodium chloride flush 0.9 % injection 10 mL  10 mL IntraVENous 2 times per day Kirk May MD   10 mL at 10/03/21 2113    sodium chloride flush 0.9 % injection 10 mL  10 mL IntraVENous PRN Kirk May MD        0.9 % sodium chloride infusion  25 mL IntraVENous PRN Kirk May MD   Stopped at 10/02/21 0344    magnesium sulfate 2000 mg in 50 mL IVPB premix  2,000 mg IntraVENous PRN Kirk May MD        promethazine (PHENERGAN) tablet 12.5 mg  12.5 mg Oral Q6H PRN Kirk May MD        Or    ondansetron [facility]) injection 4 mg  4 mg IntraVENous Q6H PRN Kirk May MD        senna (SENOKOT) tablet 8.6 mg  1 tablet Oral Daily PRN Kirk May MD        perflutren lipid microspheres (DEFINITY) injection 1.65 mg  1.5 mL IntraVENous ONCE PRN Kirk May MD        glucose (GLUTOSE) 40 % oral gel 15 g  15 g Oral PRN Kirk May MD        dextrose 50 % IV solution  12.5 g IntraVENous PRN Kirk May MD   12.5 g at 10/04/21 0810    glucagon (rDNA) injection 1 mg  1 mg IntraMUSCular PRN Kirk May MD        dextrose 5 % solution  100 mL/hr IntraVENous PRN Kirk May MD        Vitamin D (CHOLECALCIFEROL) tablet 2,000 Units  2,000 Units Oral Daily Kirk May MD   2,000 Units at 10/04/21 3077     Allergies   Allergen Reactions    Ace Inhibitors      And arbs  cough    Morphine       reports that he has never smoked. He has never used smokeless tobacco. He reports that he does not drink alcohol and does not use drugs.          Objective:  Constitutional:   Vitals:    10/03/21 1959 10/04/21 0000 10/04/21 0302 10/04/21 0813   BP: (!) 144/70 (!) 117/58 132/71 (!) 145/72   Pulse: 70 66 79 72   Resp: 20 18 18 16   Temp: 98 °F (36.7 °C) 97.9 °F (36.6 °C) 97.5 °F (36.4 °C) 97.6 °F (36.4 °C)   TempSrc: Oral Axillary Oral Axillary   SpO2: 94% 98% 96% 96%   Weight:    133 lb 6.1 oz (60.5 kg)   Height:           General appearance: Confused   Mental Status:   AAO times one. Attention and concentration: poor, waxing and waning. Language: Fluent but nonsensical speech. Able to follow simple directions. Recent memory: Impaired  Remote memory: Impaired  Poor fund of knowledge  Cranial Nerves:   II: Pupils: equal, round, reactive to light  III,IV,VI: No gaze preference. No nystagmus  V: Facial sensation: Grossly unremarkable. VII: Facial strength and movements: intact and symmetric  XII: Tongue movements : midline  Musculoskeletal:  The patient can move all 4 extremities. No apparent focal weakness. Tone: Normal tone. No rigidity. Reflexes: symmetric 2+ in both arms and legs  Coordination: No tremors  Sensation: Withdraws to pain  Gait cannot be examined due to confusion      Data:  LABS:   Lab Results   Component Value Date     10/04/2021    K 3.6 10/04/2021     10/04/2021    CO2 19 10/04/2021    BUN 33 10/04/2021    CREATININE 3.4 10/04/2021    GFRAA 22 10/04/2021    LABGLOM 18 10/04/2021    GLUCOSE 107 10/04/2021    CALCIUM 8.3 10/04/2021     Lab Results   Component Value Date    WBC 5.7 10/04/2021    RBC 2.83 10/04/2021    HGB 9.3 10/04/2021    HCT 27.5 10/04/2021    MCV 97.1 10/04/2021    RDW 15.3 10/04/2021     10/04/2021   No results found for: INR, PROTIME    Neuroimaging and labs were independently reviewed by me  Reviewed notes from different physicians. Impression:    Acute encephalopathy - likely metabolic due to underlying infection. Unable to have MRI due to sternal wires. ADRIAN on CKD  Possible GIB - GI deferring endoscopy. Acute cystitis    Recommendation    Monitor on telemetry. Supportive care. Avoid sedatives and narcotics. Resume aspirin when okay with GI. Continue statin. Continue abx for UTI. Glycemic control. A1c 5.9. Follow Cr levels. Cardiology work-up for ischemia. May be discharged from a neurological perspective when medically stable    Jay Hamm CNP      This dictation was generated by voice recognition computer software. Although all attempts are made to edit the dictation for accuracy, there may be errors in the transcription that are not intended.

## 2021-10-05 LAB
ANION GAP SERPL CALCULATED.3IONS-SCNC: 11 MMOL/L (ref 3–16)
BUN BLDV-MCNC: 32 MG/DL (ref 7–20)
CALCIUM SERPL-MCNC: 8 MG/DL (ref 8.3–10.6)
CHLORIDE BLD-SCNC: 111 MMOL/L (ref 99–110)
CO2: 15 MMOL/L (ref 21–32)
CREAT SERPL-MCNC: 3.7 MG/DL (ref 0.8–1.3)
GFR AFRICAN AMERICAN: 20
GFR NON-AFRICAN AMERICAN: 16
GLUCOSE BLD-MCNC: 101 MG/DL (ref 70–99)
GLUCOSE BLD-MCNC: 102 MG/DL (ref 70–99)
GLUCOSE BLD-MCNC: 103 MG/DL (ref 70–99)
GLUCOSE BLD-MCNC: 110 MG/DL (ref 70–99)
GLUCOSE BLD-MCNC: 189 MG/DL (ref 70–99)
GLUCOSE BLD-MCNC: 221 MG/DL (ref 70–99)
PERFORMED ON: ABNORMAL
POTASSIUM SERPL-SCNC: 3.9 MMOL/L (ref 3.5–5.1)
SARS-COV-2, NAAT: NOT DETECTED
SODIUM BLD-SCNC: 137 MMOL/L (ref 136–145)

## 2021-10-05 PROCEDURE — 97110 THERAPEUTIC EXERCISES: CPT

## 2021-10-05 PROCEDURE — 6360000002 HC RX W HCPCS: Performed by: NURSE PRACTITIONER

## 2021-10-05 PROCEDURE — 2580000003 HC RX 258: Performed by: HOSPITALIST

## 2021-10-05 PROCEDURE — 6370000000 HC RX 637 (ALT 250 FOR IP): Performed by: INTERNAL MEDICINE

## 2021-10-05 PROCEDURE — 97530 THERAPEUTIC ACTIVITIES: CPT

## 2021-10-05 PROCEDURE — 51798 US URINE CAPACITY MEASURE: CPT

## 2021-10-05 PROCEDURE — 99232 SBSQ HOSP IP/OBS MODERATE 35: CPT | Performed by: NURSE PRACTITIONER

## 2021-10-05 PROCEDURE — 97535 SELF CARE MNGMENT TRAINING: CPT

## 2021-10-05 PROCEDURE — 1200000000 HC SEMI PRIVATE

## 2021-10-05 PROCEDURE — 6370000000 HC RX 637 (ALT 250 FOR IP): Performed by: NURSE PRACTITIONER

## 2021-10-05 PROCEDURE — 87635 SARS-COV-2 COVID-19 AMP PRB: CPT

## 2021-10-05 PROCEDURE — 80048 BASIC METABOLIC PNL TOTAL CA: CPT

## 2021-10-05 PROCEDURE — 36415 COLL VENOUS BLD VENIPUNCTURE: CPT

## 2021-10-05 PROCEDURE — 6370000000 HC RX 637 (ALT 250 FOR IP): Performed by: HOSPITALIST

## 2021-10-05 PROCEDURE — 51701 INSERT BLADDER CATHETER: CPT

## 2021-10-05 RX ORDER — CARVEDILOL 6.25 MG/1
18.75 TABLET ORAL 2 TIMES DAILY WITH MEALS
Status: DISCONTINUED | OUTPATIENT
Start: 2021-10-05 | End: 2021-10-20 | Stop reason: HOSPADM

## 2021-10-05 RX ORDER — SODIUM BICARBONATE 650 MG/1
1300 TABLET ORAL 3 TIMES DAILY
Status: DISCONTINUED | OUTPATIENT
Start: 2021-10-05 | End: 2021-10-20 | Stop reason: HOSPADM

## 2021-10-05 RX ORDER — HYDRALAZINE HYDROCHLORIDE 10 MG/1
10 TABLET, FILM COATED ORAL EVERY 8 HOURS SCHEDULED
Status: DISCONTINUED | OUTPATIENT
Start: 2021-10-05 | End: 2021-10-20 | Stop reason: HOSPADM

## 2021-10-05 RX ADMIN — INSULIN LISPRO 1 UNITS: 100 INJECTION, SOLUTION INTRAVENOUS; SUBCUTANEOUS at 17:55

## 2021-10-05 RX ADMIN — CARVEDILOL 18.75 MG: 6.25 TABLET, FILM COATED ORAL at 17:55

## 2021-10-05 RX ADMIN — Medication 2000 UNITS: at 08:32

## 2021-10-05 RX ADMIN — HEPARIN SODIUM 5000 UNITS: 5000 INJECTION INTRAVENOUS; SUBCUTANEOUS at 21:12

## 2021-10-05 RX ADMIN — HYDRALAZINE HYDROCHLORIDE 25 MG: 25 TABLET, FILM COATED ORAL at 14:48

## 2021-10-05 RX ADMIN — ASPIRIN 81 MG: 81 TABLET, COATED ORAL at 08:32

## 2021-10-05 RX ADMIN — ATORVASTATIN CALCIUM 20 MG: 10 TABLET, FILM COATED ORAL at 08:32

## 2021-10-05 RX ADMIN — CARVEDILOL 25 MG: 25 TABLET, FILM COATED ORAL at 08:32

## 2021-10-05 RX ADMIN — HYDRALAZINE HYDROCHLORIDE 10 MG: 10 TABLET, FILM COATED ORAL at 21:13

## 2021-10-05 RX ADMIN — HYDRALAZINE HYDROCHLORIDE 25 MG: 25 TABLET, FILM COATED ORAL at 06:52

## 2021-10-05 RX ADMIN — INSULIN LISPRO 1 UNITS: 100 INJECTION, SOLUTION INTRAVENOUS; SUBCUTANEOUS at 21:12

## 2021-10-05 RX ADMIN — HEPARIN SODIUM 5000 UNITS: 5000 INJECTION INTRAVENOUS; SUBCUTANEOUS at 14:48

## 2021-10-05 RX ADMIN — SODIUM BICARBONATE 650 MG TABLET 1300 MG: at 14:48

## 2021-10-05 RX ADMIN — PANTOPRAZOLE SODIUM 40 MG: 40 TABLET, DELAYED RELEASE ORAL at 06:52

## 2021-10-05 RX ADMIN — TAMSULOSIN HYDROCHLORIDE 0.4 MG: 0.4 CAPSULE ORAL at 08:32

## 2021-10-05 RX ADMIN — SODIUM BICARBONATE 650 MG TABLET 1300 MG: at 20:14

## 2021-10-05 RX ADMIN — SODIUM CHLORIDE, PRESERVATIVE FREE 10 ML: 5 INJECTION INTRAVENOUS at 20:14

## 2021-10-05 RX ADMIN — ISOSORBIDE DINITRATE 10 MG: 10 TABLET ORAL at 20:14

## 2021-10-05 RX ADMIN — ISOSORBIDE DINITRATE 10 MG: 10 TABLET ORAL at 14:48

## 2021-10-05 RX ADMIN — SODIUM CHLORIDE, PRESERVATIVE FREE 10 ML: 5 INJECTION INTRAVENOUS at 08:33

## 2021-10-05 RX ADMIN — ISOSORBIDE DINITRATE 10 MG: 10 TABLET ORAL at 08:33

## 2021-10-05 RX ADMIN — HEPARIN SODIUM 5000 UNITS: 5000 INJECTION INTRAVENOUS; SUBCUTANEOUS at 06:52

## 2021-10-05 ASSESSMENT — PAIN SCALES - GENERAL
PAINLEVEL_OUTOF10: 0

## 2021-10-05 NOTE — PROGRESS NOTES
Connor Habermann  Neurology Follow-up  [facility] Neurology    Date of Service: 10/5/2021    Subjective:   CC: Follow up today regarding: Acute encephalopathy    Events noted. Chart and lab reviewed. No complaints. Pleasantly confused. Follows simple commands. ROS : A 10-12 system review could not be obtained due to poor cooperation and confusion. family history includes Diabetes in his mother.     Past Medical History:   Diagnosis Date    CAD (coronary artery disease)     Chronic renal disease     Dr. 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 ([facility] [address]     Type II or unspecified type diabetes mellitus without mention of complication, not stated as uncontrolled      Current Facility-Administered Medications   Medication Dose Route Frequency Provider Last Rate Last Admin    carvedilol (COREG) tablet 18.75 mg  18.75 mg Oral BID  Indira Hendrickson MD        sodium bicarbonate tablet 1,300 mg  1,300 mg Oral TID Indira Hendrickson MD        insulin lispro (HUMALOG) injection vial 0-6 Units  0-6 Units SubCUTAneous TID WC Hermila Kim MD        insulin lispro (HUMALOG) injection vial 0-3 Units  0-3 Units SubCUTAneous Nightly Hermila Kim MD   1 Units at 10/04/21 2110    epoetin long-epbx (RETACRIT) injection 5,000 Units  5,000 Units SubCUTAneous Weekly Indira Hendrickson MD   5,000 Units at 10/04/21 1637    tamsulosin (FLOMAX) capsule 0.4 mg  0.4 mg Oral Daily Hermila Kim MD   0.4 mg at 10/05/21 4023    hydrALAZINE (APRESOLINE) tablet 25 mg  25 mg Oral 3 times per day Tylene DANGELO Browne CNP   25 mg at 10/05/21 3953    isosorbide dinitrate (ISORDIL) tablet 10 mg  10 mg Oral TID TylDANGELO Jones CNP   10 mg at 10/05/21 7297    heparin (porcine) injection 5,000 Units  5,000 Units SubCUTAneous 3 times per day DANGELO Rodriguez CNP   5,000 Units at 10/05/21 3370    pantoprazole (PROTONIX) tablet 40 mg  40 mg Oral QAM AC DANGELO Rodriguez CNP   40 mg at 10/05/21 0652    [Held by provider] insulin glargine (LANTUS) injection vial 14 Units  14 Units SubCUTAneous Nightly Geovanny Bar MD   14 Units at 10/03/21 2113    atorvastatin (LIPITOR) tablet 20 mg  20 mg Oral Daily Geovanny Bar MD   20 mg at 10/05/21 6098    aspirin EC tablet 81 mg  81 mg Oral Daily Geovanny Bar MD   81 mg at 10/05/21 6021    sodium chloride flush 0.9 % injection 10 mL  10 mL IntraVENous 2 times per day Geovanny Bar MD   10 mL at 10/05/21 8378    sodium chloride flush 0.9 % injection 10 mL  10 mL IntraVENous PRN Geovanny Bar MD        0.9 % sodium chloride infusion  25 mL IntraVENous PRN Geovanny Bar MD   Stopped at 10/02/21 0344    magnesium sulfate 2000 mg in 50 mL IVPB premix  2,000 mg IntraVENous PRN Geovanny Bar MD        promethazine (PHENERGAN) tablet 12.5 mg  12.5 mg Oral Q6H PRN Geovanny Bar MD        Or    [facility]dansetron [facility]) injection 4 mg  4 mg IntraVENous Q6H PRN Geovanny Bar MD        senna (SENOKOT) tablet 8.6 mg  1 tablet Oral Daily PRN Geovanny Bar MD        perflutren lipid microspheres (DEFINITY) injection 1.65 mg  1.5 mL IntraVENous ONCE PRN Geovanny Bar MD        glucose (GLUTOSE) 40 % oral gel 15 g  15 g Oral PRN Geovanny Bar MD        dextrose 50 % IV solution  12.5 g IntraVENous PRN Geovanny Bar MD   12.5 g at 10/04/21 0810    glucagon (rDNA) injection 1 mg  1 mg IntraMUSCular PRN Geovanny Bar MD        dextrose 5 % solution  100 mL/hr IntraVENous PRN Geovanny Bar MD        Vitamin D (CHOLECALCIFEROL) tablet 2,000 Units  2,000 Units Oral Daily Geovanny Bar MD   2,000 Units at 10/05/21 7340     Allergies   Allergen Reactions    Ace Inhibitors      And arbs  cough    Morphine       reports that he has never smoked. He has never used smokeless tobacco. He reports that he does not drink alcohol and does not use drugs.          Objective:  Constitutional:   Vitals:    10/05/21 0134 10/05/21 0651 10/05/21 0745 10/05/21 1221   BP: (!) 100/50 122/66 116/63 (!) 112/59   Pulse: 71 75 75 68   Resp: 18 18 18 18   Temp: 97.5 °F (36.4 °C) 97.9 °F (36.6 °C) 98 °F (36.7 °C) 97.7 °F (36.5 °C)   TempSrc: Oral Oral Oral Oral   SpO2: 99% 100% 100% 99%   Weight:  134 lb 14.7 oz (61.2 kg)     Height:           General appearance: Confused   Mental Status:   AAO times one. Attention and concentration: poor, waxing and waning. Language: Fluent but nonsensical speech. Able to follow simple directions. Recent memory: Impaired  Remote memory: Impaired  Poor fund of knowledge  Cranial Nerves:   II: Pupils: equal, round, reactive to light  III,IV,VI: No gaze preference. No nystagmus  V: Facial sensation: Grossly unremarkable. VII: Facial strength and movements: intact and symmetric  XII: Tongue movements : midline  Musculoskeletal:  The patient can move all 4 extremities. No apparent focal weakness. Tone: Normal tone. No rigidity. Reflexes: symmetric 2+ in both arms and legs  Coordination: No tremors  Sensation: Withdraws to pain  Gait cannot be examined due to confusion      Data:  LABS:   Lab Results   Component Value Date     10/05/2021    K 3.9 10/05/2021    K 3.6 10/04/2021     10/05/2021    CO2 15 10/05/2021    BUN 32 10/05/2021    CREATININE 3.7 10/05/2021    GFRAA 20 10/05/2021    LABGLOM 16 10/05/2021    GLUCOSE 101 10/05/2021    CALCIUM 8.0 10/05/2021     Lab Results   Component Value Date    WBC 5.7 10/04/2021    RBC 2.83 10/04/2021    HGB 9.3 10/04/2021    HCT 27.5 10/04/2021    MCV 97.1 10/04/2021    RDW 15.3 10/04/2021     10/04/2021   No results found for: INR, PROTIME    Neuroimaging and labs were independently reviewed by me  Reviewed notes from different physicians. Impression:    Acute encephalopathy - likely metabolic due to underlying infection. Unable to have MRI due to sternal wires. ADRIAN on CKD  Possible GIB - GI deferring endoscopy.    Acute cystitis    Recommendation    Monitor on telemetry. Supportive care. Avoid sedatives and narcotics. Resume aspirin when okay with GI. Continue statin. Continue abx for UTI. Glycemic control. A1c 5.9. Follow Cr levels. Cardiology work-up for ischemia. May be discharged from a neurological perspective when medically stable. We will sign off. Please call with questions. Marshall Armstrong, STEPHANE      This dictation was generated by voice recognition computer software. Although all attempts are made to edit the dictation for accuracy, there may be errors in the transcription that are not intended.

## 2021-10-05 NOTE — PROGRESS NOTES
For patient safety, an AVASYS camera has been placed in patient's room. AVASYS monitoring needed for increased risk of falls/confusion, Pulling at Lines, Substance Withdrawal, Delirium, Elopement Risk and Potential episodes of aggression or violence. Writer placed call to monitor observer to verify camera number [id number] and review patient name, reason for monitoring, and contact information for primary RN. Patient, Family/Healthcare Decision Maker, HCPOA and Legal Guardian notified of use of remote monitoring upon implementation of camera and needs reinforcement.

## 2021-10-05 NOTE — PLAN OF CARE
Problem: Falls - Risk of:  Goal: Will remain free from falls  Description: Will remain free from falls  Note:      Bed alarm on, call light with in reach. Encourage pt to use call light. Will continue to monitor safety. Problem: Injury - Risk of, Physical Injury:  Goal: Will remain free from falls  Description: Will remain free from falls  Note:      Will continue to monitor for safety, avasys camera in room. Call light with in reach.

## 2021-10-05 NOTE — PROGRESS NOTES
[facility] NEPHROLOGY    [facility]              [phone]                         Interval History and plan:      Creatinine is higher at 3.7  Interactive  GFR is 15 ml/min  Bicarb is 15  No pedal edema and not on oxygen and lying flat on room air  Has EF of 30%, no plans for angiogram  BP is soft  Plan:    No indication for renal replacement therapy at this point but he may need in the future    Creatinine is getting worse, likely not tolerating low BP  Bicarb is also very low  Treat with po bicarb  And also decrease dose of coreg    Renal function should be stable prior to DC                    Assessment :     Acute Kidney Injury on CKD 4  ADRIAN likely due to -prerenal etiology  Cr on consultation 3.3  Baseline Cr- 3  CKD likely due to diabetes hypertension recurrent ADRIAN  He was admitted to [facility] on 6/29- 7/8/21- with ADRIAN  Cr at DC was 3.38 from peak of 4.75(pre-renal)  UA- small blood, small nitrite, wbc 21-50  Renal Imaging:-10/21-right kidney 8.2 cm, left 8.3 cm, no hydronephrosis  Echo: pending  7/21- nromal EF, RV pressure < 35 mmHg      Hypertension   BP: (116-122)/(63-66)  Pulse:  [75]   BP goal inpatient [phone] systolic inpatient    Anemia of CKD  Iron sat normal  Hb 9.5 on consult    Acidosis  Oral bicarb ok    BPH  History of renal stones  CAD  Carotid artery occlusion  Diabetes mellitus        [facility] Nephrology would like to thank Rafy Edgar MD   for opportunity to serve this patient      Please call with questions at-   24 Hrs Answering service [phone] or  7 am- 5 pm via Perfect serve or cell phone  Marnie Lai MD          CC/reason for consult :     ADRIAN     HPI :     Macon Baumgarten is a 78 y.o. male presented to   the hospital on 9/30/2021 with altered mental status from nursing facility. He is unable to provide details. Per reports he has had bright red blood per rectum.   He was brought to the emergency room where work-up showed possible inferolateral ischemia on the heart and had QTc prolongation. CT scan of the head was done which was negative. He is scheduled to do MRI but has not done yet. He was found to have metabolic acidosis, ADRIAN. Also elevation of troponin. He had a UA done which showed possible infection, culture is pending also blood culture is sent and results are pending. He is currently on IV antibiotics. We are consulted for ADRIAN and related issues    ROS:     Positives Listed Bold. All other remaining systems are negative. Constitutional:  fever, chills, weakness, weight change, fatigue,      Skin:  rash, pruritus, hair loss, bruising, dry skin, petechiae. Head, Face, Neck   headaches, swelling,  cervical adenopathy. Respiratory: shortness of breath, cough, or wheezing  Cardiovascular: chest pain, palpitations, dizzy, edema  Gastrointestinal: nausea, vomiting, diarrhea, constipation,belly pain    Yellow skin, blood in stool  Musculoskeletal:  back pain, muscle weakness, gait problems,       joint pain or swelling. Genitourinary:  dysuria, poor urine flow, flank pain, blood in urine  Neurologic:  vertigo, TIA'S, syncope, seizures, focal weakness  Psychosocial:  insomnia, anxiety, or depression. Additional positive findings: -         PMH/PSH/SH/Family History:     Past Medical History:   Diagnosis Date    CAD (coronary artery disease)     Chronic renal disease     Dr. Salter Yazidism Hyperlipidemia     Hypertension     [facility]inopathy due to secondary DM ([facility] [address]     Type II or unspecified type diabetes mellitus without mention of complication, not stated as uncontrolled        Past Surgical History:   Procedure Laterality Date    CARDIAC SURGERY  2006    quad by pass    COLONOSCOPY      pt refuses to have one    EYE SURGERY Bilateral 2003   [address] LUNG SURGERY  2006    scraped lungs        reports that he has never smoked.  He has never used smokeless tobacco. He reports that he does not drink alcohol and does not use drugs. family history includes Diabetes in his mother.          Medication:     Current Facility-Administered Medications: insulin lispro (HUMALOG) injection vial 0-6 Units, 0-6 Units, SubCUTAneous, TID WC  insulin lispro (HUMALOG) injection vial 0-3 Units, 0-3 Units, SubCUTAneous, Nightly  epoetin long-epbx (RETACRIT) injection 5,000 Units, 5,000 Units, SubCUTAneous, Weekly  tamsulosin (FLOMAX) capsule 0.4 mg, 0.4 mg, Oral, Daily  hydrALAZINE (APRESOLINE) tablet 25 mg, 25 mg, Oral, 3 times per day  isosorbide dinitrate (ISORDIL) tablet 10 mg, 10 mg, Oral, TID  heparin (porcine) injection 5,000 Units, 5,000 Units, SubCUTAneous, 3 times per day  pantoprazole (PROTONIX) tablet 40 mg, 40 mg, Oral, QAM AC  carvedilol (COREG) tablet 25 mg, 25 mg, Oral, BID WC  [Held by provider] insulin glargine (LANTUS) injection vial 14 Units, 14 Units, SubCUTAneous, Nightly  atorvastatin (LIPITOR) tablet 20 mg, 20 mg, Oral, Daily  aspirin EC tablet 81 mg, 81 mg, Oral, Daily  sodium chloride flush 0.9 % injection 10 mL, 10 mL, IntraVENous, 2 times per day  sodium chloride flush 0.9 % injection 10 mL, 10 mL, IntraVENous, PRN  0.9 % sodium chloride infusion, 25 mL, IntraVENous, PRN  magnesium sulfate 2000 mg in 50 mL IVPB premix, 2,000 mg, IntraVENous, PRN  promethazine (PHENERGAN) tablet 12.5 mg, 12.5 mg, Oral, Q6H PRN **OR** ondansetron (ZOFRAN) injection 4 mg, 4 mg, IntraVENous, Q6H PRN  senna (SENOKOT) tablet 8.6 mg, 1 tablet, Oral, Daily PRN  perflutren lipid microspheres (DEFINITY) injection 1.65 mg, 1.5 mL, IntraVENous, ONCE PRN  glucose (GLUTOSE) 40 % oral gel 15 g, 15 g, Oral, PRN  dextrose 50 % IV solution, 12.5 g, IntraVENous, PRN  glucagon (rDNA) injection 1 mg, 1 mg, IntraMUSCular, PRN  dextrose 5 % solution, 100 mL/hr, IntraVENous, PRN  Vitamin D (CHOLECALCIFEROL) tablet 2,000 Units, 2,000 Units, Oral, Daily       Vitals :     Vitals:    10/05/21 0745   BP: 116/63   Pulse: 75   Resp: 18   Temp: 98 °F (36.7 °C)   SpO2: 100%       I & O :       Intake/Output Summary (Last 24 hours) at 10/5/2021 1136  Last data filed at 10/5/2021 0908  Gross per 24 hour   Intake 130 ml   Output 363 ml   Net -233 ml        Physical Examination :     General appearance: male in NAD, alert and awake. HEENT: EOM intact, no icterus. Trachea is midline. Neck : No mass, appears symmetrical, no JVD   Respiratory: Respiratory effort appears normal, no wheeze, no crackles  Cardiovascular: Ausculation- No M/R/G, no edema  Abdomen: No visible mass or tenderness  Musculoskeletal:  No clubbing,cyanosis, joints with no swelling or deformity. Skin:no rashes, ulcers, induration, no jaundice. Neuro: face symmetric, no focal deficits. Appropriate responses.  CN 2-12 grossly intact    Additional findings:-        LABS:     Recent Labs     10/03/21  0612 10/04/21  0816   WBC 4.5 5.7   HGB 9.1* 9.3*   HCT 26.9* 27.5*   * 138     Recent Labs     10/03/21  0612 10/04/21  0816 10/05/21  1059    140 137   K 3.8 3.6 3.9   * 111* 111*   CO2 18* 19* 15*   BUN 37* 33* 32*   CREATININE 3.1* 3.4* 3.7*   GLUCOSE 92 107* 101*

## 2021-10-05 NOTE — PROGRESS NOTES
Occupational Therapy  Facility/Department: [facility] B3 - MED SURG  Daily Treatment Note  NAME: Randee Davis  : 1945  MRN: [id number]    Date of Service: 10/5/2021    Discharge Recommendations:  Subacute/Skilled Nursing Facility       Assessment   Performance deficits / Impairments: Decreased functional mobility ; Decreased ADL status; Decreased cognition;Decreased safe awareness;Decreased strength;Decreased endurance;Decreased balance;Decreased high-level IADLs;Decreased fine motor control;Decreased vision/visual deficit; Decreased coordination  Assessment: Pt tolerated OT session fair, more alert and oriented this session. Pt continues to demo impaired sitting and standing balance with posterior lean, requiring mod A of 2 for transfers and initial standing balance. Co-tx collaboration this date with PT staff to safely progress pt toward goals. Pt will have better occupational performance outcomes within a co-treatment than 1:1 session. Pt functioning below his baseline and would benefit from continued skilled OT in SNF setting at d/c. Prognosis: Good;Fair  OT Education: OT Role;Plan of Care;Transfer Training;Orientation  Disease Specific Education: Pt educated on importance of OOB mobility, prevention of complications of bedrest, and general safety during hospitalization. Pt does not verbalize understanding and would benefit from reinforcement. Barriers to Learning: cognition  REQUIRES OT FOLLOW UP: Yes  Activity Tolerance  Activity Tolerance: Patient Tolerated treatment well  Activity Tolerance: Vitals: BP= 129/63, HR= 70, SPO2= 98%  Safety Devices  Safety Devices in place: Yes  Type of devices: Left in bed;Bed alarm in place;Call light within reach;Nurse notified;Gait belt         Patient Diagnosis(es): The primary encounter diagnosis was Acute renal failure superimposed on chronic kidney disease, unspecified CKD stage, unspecified acute renal failure type ([facility] [address]  Diagnoses of Urinary tract infection without hematuria, site unspecified and Altered mental status, unspecified altered mental status type were also pertinent to this visit. 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 has a past surgical history that includes Cardiac surgery (2006); Lung surgery (2006); Kidney stone surgery (1996); Eye surgery (Bilateral, 2003); and Colonoscopy. Restrictions  Restrictions/Precautions  Restrictions/Precautions: Up as Tolerated, General Precautions, Fall Risk  Position Activity Restriction  Other position/activity restrictions: A[employer] monitor in room, legally blind     Subjective   General  Patient assessed for rehabilitation services?: Yes  Response to previous treatment: Patient with no complaints from previous session  Family / Caregiver Present: No    Subjective  Subjective: Pt resting in bed at approach, pleasant and agreeable to OT treatment. Vital Signs  Patient Currently in Pain: Denies     Orientation  Orientation  Orientation Level: Oriented to person;Oriented to place;Oriented to situation;Disoriented to time     Objective    ADL  Feeding: Setup;Verbal cueing; Increased time to complete  Toileting: Dependent/Total (purewick)     Balance  Sitting Balance: Moderate assistance (initially mod A due to posterior lean, improves to CGA as session progresses)  Standing Balance: Dependent/Total (varies from min A of 1 to mod A of 2 with RW, posterior lean)  Standing Balance  Activity: 1st trial static stand 2 mins; 2nd trial stepping toward HOB    Bed mobility  Supine to Sit: Moderate assistance (to L with HOB elevated)  Sit to Supine:  Moderate assistance     Transfers  Sit to stand: Dependent/Total;2 Person assistance (mod A of 2)  Stand to sit: Dependent/Total;2 Person assistance (mod A of 2) Cognition  Overall Cognitive Status: Exceptions  Following Commands: Follows one step commands with repetition; Follows one step commands with increased time  Attention Span: Attends with cues to redirect  Safety Judgement: Decreased awareness of need for assistance;Decreased awareness of need for safety  Insights: Decreased awareness of deficits  Initiation: Requires cues for some  Sequencing: Requires cues for some     Plan   Plan  Times per week: 3-5x  Specific instructions for Next Treatment: cotx  Current Treatment Recommendations: Balance Training, Functional Mobility Training, Safety Education & Training, Cognitive Reorientation, Self-Care / ADL, Patient/Caregiver Education & Training, Home Management Training, Endurance Training    AM-PAC Score  AM-PAC Inpatient Daily Activity Raw Score: 12 (10/05/21 1449)  AM-PAC Inpatient ADL T-Scale Score : 30.6 (10/05/21 1449)  ADL Inpatient CMS 0-100% Score: 66.57 (10/05/21 1449)  ADL Inpatient CMS G-Code Modifier : CL (10/05/21 1449)    Goals  Short term goals  Time Frame for Short term goals: 1 week by 10/8  Short term goal 1: Pt will complete toileting with Mod A by 10/4-- GOAL NOT MET, dependent 10/5/21  Short term goal 2: Pt will complete LBD with Mod  A-- ongoing 10/5/21  Short term goal 3: Pt will complete grooming seated EOB with Min A-- ongoing 10/5/21  Short term goal 4: Pt will complete UBD seated EOB with Min A-- ongoing 10/5/21  Long term goals  Time Frame for Long term goals : STGs=LTGs  Patient Goals   Patient goals : unable to state       Therapy Time   Individual Concurrent Group Co-treatment   Time In 1300         Time Out 1326         Minutes [address] [facility], GONZALEZ/L

## 2021-10-05 NOTE — CARE COORDINATION
Call back received from Justino at [facility]. States facility WOULD be able to straight cath patient prn. Rapid covid needed (ordered at 1:14 PM).      Report to be called to: [phone]  Fax orders to: [phone]

## 2021-10-05 NOTE — CARE COORDINATION
Per primary nurse, Valentine Reeder, pt likely to discharge back to [facility] today. Questions if staff at SNF can straight cath pt every 4 hours. VM left for admissions staff, Zurdo Goodson, to see if staff can accommodate. Transport arranged for 1400  with [facility]. Primary nurse aware.

## 2021-10-05 NOTE — PROGRESS NOTES
Physical Therapy  Facility/Department: [facility] B3 - MED SURG  Daily Treatment Note  NAME: Randee Davis  : 1945  MRN: [id number]    Date of Service: 10/5/2021    Discharge Recommendations:  Subacute/Skilled Nursing Facility   PT Equipment Recommendations  Equipment Needed: No    Assessment   Body structures, Functions, Activity limitations: Decreased functional mobility ; Decreased safe awareness;Decreased balance;Decreased endurance;Decreased strength;Decreased cognition  Assessment: Pt demonstrating bed mobility with modA, t/f with modA x 2 and standing with modA to maxA x 1. Pt demonstrates posterior lean in sitting and heavy lean in standing requiring up to modA/maxA x 1 and unable to full correct for progression of gait this date despite maximal cues. Pt will benefit from continued skilled PT in acute care setting to address above deficits. Co-tx collaboration this date with OT staff to safely progress pt toward goals. Pt will have better performance outcomes within a co-treatment than 1:1 session. Treatment Diagnosis: weakness, decreased mobility, decreased cognition  Prognosis: Good  Decision Making: Medium Complexity  Patient Education: Disease Specific: pt educated in general safety, reorientation provided. Pt able to repeat back information but will need reinforcement  Barriers to Learning: confusion  REQUIRES PT FOLLOW UP: Yes  Activity Tolerance  Activity Tolerance: Patient Tolerated treatment well  Activity Tolerance: BP= 129/63, HR= 70, SPO2= 98%     Patient Diagnosis(es): The primary encounter diagnosis was Acute renal failure superimposed on chronic kidney disease, unspecified CKD stage, unspecified acute renal failure type ([facility] [address] Diagnoses of Urinary tract infection without hematuria, site unspecified and Altered mental status, unspecified altered mental status type were also pertinent to this visit.      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 has a past surgical history that includes Cardiac surgery (2006); Lung surgery (2006); Kidney stone surgery (1996); Eye surgery (Bilateral, 2003); and Colonoscopy. Restrictions  Restrictions/Precautions  Restrictions/Precautions: Up as Tolerated, General Precautions, Fall Risk  Position Activity Restriction  Other position/activity restrictions: AvaSys monitor in room, legally blind  Subjective   General  Chart Reviewed: Yes  Response To Previous Treatment: Patient with no complaints from previous session. Family / Caregiver Present: No  Referring Practitioner: Veronica Palmer DO  Subjective  Subjective: Pt supine in bed on approach, pleasantly confused, agreeable to PT tx  General Comment  Comments: RN cleared pt for session, requesting for pt to remain in bed at end of session  Pain Screening  Patient Currently in Pain: Denies  Vital Signs  Patient Currently in Pain: Denies       Orientation  Orientation  Orientation Level: Oriented to person;Disoriented to time;Oriented to place;Oriented to situation  Cognition      Objective   Bed mobility  Supine to Sit: Moderate assistance (to L with HOB elevated)  Sit to Supine: Moderate assistance  Transfers  Sit to Stand: Dependent/Total;2 Person Assistance (mod of 2)  Stand to sit: Dependent/Total;2 Person Assistance (mod of 2)  Ambulation  Ambulation?: No (unsafe d/t profound posterior lean)     Balance  Comments: Sitting Balance:  Moderate assistance (initially mod A due to posterior lean, improves to CGA as session progresses)  Standing Balance: Dependent/Total (varies from min A of 1 to mod A of 2 with RW, posterior lean)  Exercises  Heelslides: 10 B  Hip Abduction: 10 B  Knee Long Arc Quad: 10 B  Ankle Pumps: 15 B         Comment: 1st trial static stand 2 mins; 2nd trial stepping toward [facility]  AM-PAC Score  AM-PAC Inpatient Mobility Raw Score : 10 (10/05/21 1542)  AM-PAC Inpatient T-Scale Score : 32.29 (10/05/21 1542)  Mobility Inpatient CMS 0-100% Score: 76.75 (10/05/21 1542)  Mobility Inpatient CMS G-Code Modifier : CL (10/05/21 1542)          Goals  Short term goals  Time Frame for Short term goals: 1 week (10/7) unless otherwise specified  Short term goal 1: pt to perform bed mob Supervised -- 10/05: Stoney Orris supine to/from sit  Short term goal 2: pt to perform transfers CG -- 10/05: Stoney Orris x 2 sit to/from stand with RW  Short term goal 3: pt to amb with least restrictive or no device 50 ft CG -- 10/05: TRISTEN  Short term goal 4: pt to participate in LE Ex 8-10 reps by 10/5 -- 10/05:initiated  Patient Goals   Patient goals : Pt is confused, does not state goals    Plan    Plan  Times per week: 3-5 x wk  Times per day: Daily  Specific instructions for Next Treatment: Progress mobility as tolerated  Current Treatment Recommendations: Strengthening, Transfer Training, Endurance Training, Cognitive Reorientation, Balance Training, Gait Training, Functional Mobility Training, Safety Education & Training, Neuromuscular Re-education, Home Exercise Program, Patient/Caregiver Education & Training, Positioning  Safety Devices  Type of devices:  All fall risk precautions in place, Gait belt, Bed alarm in place, Patient at risk for falls, Nurse notified, Call light within reach, Left in bed, Telesitter in use     Therapy Time   Individual Concurrent Group Co-treatment   Time In 1300         Time Out 1324         Minutes 24         Timed Code Treatment Minutes: [address]

## 2021-10-05 NOTE — PLAN OF CARE
Problem: Skin Integrity:  Goal: Will show no infection signs and symptoms  Description: Will show no infection signs and symptoms  Outcome: Ongoing   Decrease Marcelo scale. Turning pt every two hours to help with skin breakdown. Skin barrier cream applied. Heels elevated off bed. Will continue to monitor skin. Problem: Confusion - Acute:  Goal: Absence of continued neurological deterioration signs and symptoms  Description: Absence of continued neurological deterioration signs and symptoms  Outcome: Ongoing   Patient is alert to person, disoriented to time, place at times, and situation.

## 2021-10-05 NOTE — PROGRESS NOTES
Patient's EF (Ejection Fraction) is less than 40%    Heart Failure Medications:   Diuretics[de-identified] None     (One of the following REQUIRED for EF <40%/SYSTOLIC FAILURE but MAY be used in EF% >40%/DIASTOLIC FAILURE)        ACE[de-identified] None        ARB[de-identified] None         ARNI[de-identified] None    (Beta Blockers)   NON- Evidenced Based Beta Blocker (for EF% >40%/DIASTOLIC FAILURE): None     Evidenced Based Beta Blocker::(REQUIRED for EF% <40%/SYSTOLIC FAILURE) Carvedilol- Coreg  . .................................................................................................................................................. Patient's weights and intake/output reviewed: Yes    Patient's Last Weight: 134 lbs obtained by bed scale. Difference of 1 lbs less than last documented weight. 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 >>For CHF and Comorbidity documentation on Education Time and Topics, please see Education Tab    Progressive Mobility Assessment:  What is this patient's Current Level of Mobility?: Ambulatory- with Assistance  How was this patient Mobilized today?: Edge of Bed and Up to Chair                 With Whom? PCA, PT and OT                 Level of Difficulty/Assistance: 2x Assist     Pt resting in bed at this time on room air. Pt denies shortness of breath. Pt without lower extremity edema.      Patient and/or Family's stated Goal of Care this Admission: reduce shortness of breath, increase activity tolerance, better understand heart failure and disease management, be more comfortable and reduce lower extremity edema prior to discharge        :

## 2021-10-05 NOTE — PROGRESS NOTES
For patient safety, an AVASYS camera has been placed in patient's room. AVASYS monitoring needed for Confusion, Increased Fall Risk, Pulling at Lines, Delirium, Elopement Risk and Potential episodes of aggression or violence. Writer placed call to monitor observer to verify camera number [id number] and review patient name, reason for monitoring, and contact information for primary RN. Patient, Family/Healthcare Decision Maker and Legal Guardian notified of use of remote monitoring upon implementation of camera and verbalized understanding.

## 2021-10-05 NOTE — PROGRESS NOTES
[facility]   Daily Progress Note    Admit Date:  9/30/2021  HPI:    Chief Complaint   Patient presents with    Altered Mental Status     Pt arrived Gabriela Ulices, mental status change over the past 2 days. Pt is normally alert and oriented per EMS report. Pt now implusive alert only to self. Pt was found to have moderate blood today from rectum.         Interval history: Marion Mueller is being followed for elevated troponin. Presented with altered mental status from nursing home, legally blind. Hx CAD/ CABG, ICM, HYPERTENSION, HLD,CKD, DM, anemia. Subjective:  Mr. Meño Horta awake and alert. Denies any chest pain or shortness of breath.     Objective:   /63   Pulse 75   Temp 98 °F (36.7 °C) (Oral)   Resp 18   Ht 5' 8\" (1.727 m)   Wt 134 lb 14.7 oz (61.2 kg)   SpO2 100%   BMI 20.51 kg/m²       Intake/Output Summary (Last 24 hours) at 10/5/2021 1030  Last data filed at 10/5/2021 0908  Gross per 24 hour   Intake 130 ml   Output 363 ml   Net -233 ml       NYHA: III    Physical Exam:  General:  Awake, alert, NAD  Skin:  Warm and dry  Neck:  JVD<8  Chest:  Clear to auscultation, no wheezes/rhonchi/rales  Telemetry: Sinus rhythm 70-80s  Cardiovascular:  RRR S1S2, no m/r/g   Abdomen:  Soft, nontender, +bowel sounds  Extremities: No bilateral lower extremity edema    Medications:    insulin lispro  0-6 Units SubCUTAneous TID WC    insulin lispro  0-3 Units SubCUTAneous Nightly    epoetin long-epbx  5,000 Units SubCUTAneous Weekly    tamsulosin  0.4 mg Oral Daily    hydrALAZINE  25 mg Oral 3 times per day    isosorbide dinitrate  10 mg Oral TID    heparin (porcine)  5,000 Units SubCUTAneous 3 times per day    pantoprazole  40 mg Oral QAM AC    carvedilol  25 mg Oral BID WC    [Held by provider] insulin glargine  14 Units SubCUTAneous Nightly    atorvastatin  20 mg Oral Daily    aspirin  81 mg Oral Daily    sodium chloride flush  10 mL IntraVENous 2 times per day    Vitamin D 2,000 Units Oral Daily      sodium chloride Stopped (10/02/21 0344)    dextrose         Lab Data:  CBC:   Recent Labs     10/03/21  0612 10/04/21  0816   WBC 4.5 5.7   HGB 9.1* 9.3*   * 138     BMP:    Recent Labs     10/03/21  0612 10/04/21  0816    140   K 3.8 3.6   CO2 18* 19*   BUN 37* 33*   CREATININE 3.1* 3.4*     INR:  No results for input(s): INR in the last 72 hours. BNP:  No results for input(s): PROBNP in the last 72 hours. No results found for: LVEF, LVEFMODE    Testing:    Coronary Artery Disease s/p CABG  03/31/2006 Cath: EF=20%. Severe 3 vessel disease. [facility]  04/26/2006 CABG: LIMA-> LAD^Diag. SVG->OM^PDA. Marija Almodovar  03/07/2008 MPI: EF.39, No evidence of reversible ischemia  06/11/2013 MPI: EF=45%, inferolateral scar. No ischemia  Cardiomyopathy  03/31/2008 Echo: EF=25-30%, Mild MR, Mild LAE, Global hypokinesis  05/08/2009 Echo: EF=45-50%, mild aortic root dilatation at 4.0cm  05/25/2011 Echo: EF= 45-50%, Grade I DD, Moderate LVH, inferior HK  05/23/2013 Echo: EF=50-55%, aortic root=3.6cm  08/06/2019 Echo: EF=50-55%, grade I DD, mild LAE, mild to mod MR        Echocardiogram 7/1/2021  Summary:   Mild aortic root dilatation. There is mild mitral regurgitation. The left ventricular function is low normal.   Overall left ventricular ejection fraction is estimated to be 50-55%. There is borderline global hypokinesis of the left ventricle. Right ventricular systolic pressure is normal at <35 mmHg. Echo 9/30/21  The left ventricular systolic function is severely reduced with an ejection fraction of 15-20 %. There is global with regional variations of wall motion, inferior wall moves   best.   Grade II diastolic dysfunction with elevated filling pressure. Moderate mitral regurgitation. Mild aortic, tricuspid and aortic regurgitation. Right ventricular systolic function is moderately reduced .    Systolic pulmonic artery pressure (SPAP) is estimated at 48 mmHg consistent with mild pulmonary hypertension (Right atrial pressure of 8mmHg). LEXISCAN NUCLEAR STRESS 10/4/21  Summary    ABNORMAL HIGH RISK STRESS TEST    LV size is dilated and severely reduced systolic function. Left ventricular    ejection fraction of 32%. Moderate global hypokinesis with severe    hypokinesis of the basal and mid inferior wall. There is a large defect in    the basal and mid inferior wall at stress that does not reverse consistent    with scar. No gross ischemia. Principal Problem:    Acute encephalopathy  Active Problems:    CAD (coronary artery disease)    DM (diabetes mellitus), type 2 (HCC)    Hyperlipidemia    Hypertension    Acute renal failure superimposed on chronic kidney disease (HCC)    Acute UTI    Elevated troponin    Cardiomyopathy ([facility] [address]    Normocytic anemia  Resolved Problems:    * No resolved hospital problems. *      Assessment:  Elevated troponin: down trending  Ischemic cardiomyopathy with worsening LVEF down to 15 to 20%  CAD s/p CABG 2006  Diabetes  HLD  CKD stage IV  Carotid stenosis  Anemia    Plan:  1. Stress test with EF 32%, large scar but no ischemia. No cath given no ischemia and CKD4, asymptomatic  2. Continue evidence-based beta blocker Coreg 25 mg bid  3. Continue hydralazine and nitrate combination - not able to use ACEi/ARB/ ARNI given A/ CKD  4. Continue ASA and statin  5. No need for diuretic at this time. 6. Needs follow up with primary cardiologist to optimize guideline directed medical therapy (Dr. Xena Porter)  7. Nothing further to contribute. Will sign off. Call if needed.        [facility] [address] heart cardiology       Nelson Wilcox, DANGELO - CNP, 10/5/2021, 10:30 AM

## 2021-10-05 NOTE — PROGRESS NOTES
Hospitalist Progress Note      PCP: Luis Manuel Every    Date of Admission: 9/30/2021    Chief Complaint: [address] Course:     H& P reviewed     Subjective:    Pt awake and alert. Denied any new complaint.        Medications:  Reviewed    Infusion Medications    sodium chloride Stopped (10/02/21 0344)    dextrose       Scheduled Medications    carvedilol  18.75 mg Oral BID WC    sodium bicarbonate  1,300 mg Oral TID    insulin lispro  0-6 Units SubCUTAneous TID WC    insulin lispro  0-3 Units SubCUTAneous Nightly    epoetin long-epbx  5,000 Units SubCUTAneous Weekly    tamsulosin  0.4 mg Oral Daily    hydrALAZINE  25 mg Oral 3 times per day    isosorbide dinitrate  10 mg Oral TID    heparin (porcine)  5,000 Units SubCUTAneous 3 times per day    pantoprazole  40 mg Oral QAM AC    [Held by provider] insulin glargine  14 Units SubCUTAneous Nightly    atorvastatin  20 mg Oral Daily    aspirin  81 mg Oral Daily    sodium chloride flush  10 mL IntraVENous 2 times per day    Vitamin D  2,000 Units Oral Daily     PRN Meds: sodium chloride flush, sodium chloride, magnesium sulfate, promethazine **OR** ondansetron, senna, perflutren lipid microspheres, glucose, dextrose, glucagon (rDNA), dextrose      Intake/Output Summary (Last 24 hours) at 10/5/2021 1323  Last data filed at 10/5/2021 0908  Gross per 24 hour   Intake 130 ml   Output 363 ml   Net -233 ml       Physical Exam Performed:    BP (!) 112/59   Pulse 68   Temp 97.7 °F (36.5 °C) (Oral)   Resp 18   Ht 5' 8\" (1.727 m)   Wt 134 lb 14.7 oz (61.2 kg)   SpO2 99%   BMI 20.51 kg/m²       Labs:   Recent Labs     10/03/21  0612 10/04/21  0816   WBC 4.5 5.7   HGB 9.1* 9.3*   HCT 26.9* 27.5*   * 138     Recent Labs     10/03/21  0612 10/04/21  0816 10/05/21  1059    140 137   K 3.8 3.6 3.9   * 111* 111*   CO2 18* 19* 15*   BUN 37* 33* 32*   CREATININE 3.1* 3.4* 3.7*   CALCIUM 8.5 8.3 8.0*     Recent Labs     10/04/21  0816 AST 11*   ALT 8*   BILITOT 0.3   ALKPHOS 66     No results for input(s): INR in the last 72 hours. No results for input(s): Prentis Revels in the last 72 hours. Urinalysis:      Lab Results   Component Value Date    NITRU Negative 09/30/2021    WBCUA 21-50 09/30/2021    BACTERIA 3+ 09/30/2021    RBCUA 5-10 09/30/2021    BLOODU SMALL 09/30/2021    SPECGRAV 1.015 09/30/2021    GLUCOSEU Negative 09/30/2021       Radiology:  NM Cardiac Stress Test Nuclear Imaging   Final Result      VL Extremity Venous Left   Final Result      XR CHEST PORTABLE   Final Result   1. Congestive heart failure. US RETROPERITONEAL COMPLETE   Final Result   Grossly unremarkable ultrasound of the kidneys and urinary bladder. No   hydronephrosis. CT HEAD WO CONTRAST   Final Result   No acute intracranial abnormality. Small right mastoid effusion                 Assessment/Plan:    Active Hospital Problems    Diagnosis     Elevated troponin [R77.8]     Cardiomyopathy ([facility] [address] [I42.9]     Normocytic anemia [D64.9]     Acute renal failure superimposed on chronic kidney disease (HCC) [N17.9, N18.9]     Acute UTI [N39.0]     Acute encephalopathy [G93.40]     DM (diabetes mellitus), type 2 ([facility] [address] [E11.9]     Hypertension [I10]     Hyperlipidemia [E78.5]     CAD (coronary artery disease) [I25.10]      BRBPR with anemia: no acute GIB noted inpatient with no need for endoscopy at this time  per GI. Continue to follow clinically for any reoccurrence. hgb stable          Acute metab encephalopathy: likely due to UTI, worsening of underlying dementia. Neuro assisting. MRI  Brain-unable to obtain d/t sternal wires. Continue supportive care, avoid sedatives    ADRIAN on CKD : likely prerenal , had improved with IVF. nephro assisting. Cr uptrending to 3.7 on lab today. Coreg dose decreased and started  On PO bicarb. Monitor and avoid nephrotoxins      UTI with possible acute mastoditis:UC grew skin/urogenital rick. Completed treatment with IV ceftriaxone and IV Zyvox, d/obey on 10/4. DMII with hypoglycemia: Lantus held with no further hypoglycemia. Continue SSI . Monitor and adjust insulin doses as needed      Cardiomyopathy : Ef 15-20% on echo. continue beta-blocker, hydralazine and nitrate combination. No ACE/ARB/ARNI/aldosterone antagonist due to renal insufficiency. CAD with elevated trop : cardio assisting . Stress test abnormal-EF 32%, large scar with no ischemia. Medical management recommended per cardio. Continue BB, aspirin, statin        Anemia: likely anemia of chronic disease due to CKD. on retacrit weekly      Urinary retention: likely due to  UTI which has been treated. Started on Flomax. Ryan deferred for now as patient likely to pull it given his AMS. continue intermittent straight cath. DVT Prophylaxis: scds  Diet: ADULT DIET; Regular; 4 carb choices (60 gm/meal); Low Sodium (2 gm)  Code Status: Full Code    PT/OT Eval Status: consulted    Dispo -no discharge to SNF today per nephro as would like creatinine to be stable prior to discharge.      Krysta Jones MD

## 2021-10-05 NOTE — PROGRESS NOTES
patient here with CKD stage 2 with retention. No output over night. Order for straight cath one time that was done yesterday.  [facility]

## 2021-10-05 NOTE — CARE COORDINATION
No discharge order or signed MAXIMILIAN at this time. Transport next available is at 2045 - transport changed and primary nurse, Marianne, notified, as well as Vickie Postal at [facility].    1:24 PM  Provider called Marianne back. States pt not leaving today d/t elevated creatinine. CM updated Juanita at [facility]. Transport with [facility] cancelled with dispatcher, Josh Godfrey.

## 2021-10-06 LAB
ANION GAP SERPL CALCULATED.3IONS-SCNC: 10 MMOL/L (ref 3–16)
BUN BLDV-MCNC: 35 MG/DL (ref 7–20)
CALCIUM SERPL-MCNC: 8 MG/DL (ref 8.3–10.6)
CHLORIDE BLD-SCNC: 112 MMOL/L (ref 99–110)
CO2: 19 MMOL/L (ref 21–32)
CREAT SERPL-MCNC: 3.8 MG/DL (ref 0.8–1.3)
GFR AFRICAN AMERICAN: 19
GFR NON-AFRICAN AMERICAN: 16
GLUCOSE BLD-MCNC: 161 MG/DL (ref 70–99)
GLUCOSE BLD-MCNC: 169 MG/DL (ref 70–99)
GLUCOSE BLD-MCNC: 174 MG/DL (ref 70–99)
GLUCOSE BLD-MCNC: 200 MG/DL (ref 70–99)
GLUCOSE BLD-MCNC: 203 MG/DL (ref 70–99)
HCT VFR BLD CALC: 25.3 % (ref 40.5–52.5)
HEMOGLOBIN: 8.8 G/DL (ref 13.5–17.5)
MCH RBC QN AUTO: 33.1 PG (ref 26–34)
MCHC RBC AUTO-ENTMCNC: 34.6 G/DL (ref 31–36)
MCV RBC AUTO: 95.6 FL (ref 80–100)
PDW BLD-RTO: 15.6 % (ref 12.4–15.4)
PERFORMED ON: ABNORMAL
PLATELET # BLD: 104 K/UL (ref 135–450)
PMV BLD AUTO: 8.8 FL (ref 5–10.5)
POTASSIUM SERPL-SCNC: 4.2 MMOL/L (ref 3.5–5.1)
RBC # BLD: 2.65 M/UL (ref 4.2–5.9)
SODIUM BLD-SCNC: 141 MMOL/L (ref 136–145)
WBC # BLD: 4.7 K/UL (ref 4–11)

## 2021-10-06 PROCEDURE — 97530 THERAPEUTIC ACTIVITIES: CPT

## 2021-10-06 PROCEDURE — 6370000000 HC RX 637 (ALT 250 FOR IP): Performed by: INTERNAL MEDICINE

## 2021-10-06 PROCEDURE — 85027 COMPLETE CBC AUTOMATED: CPT

## 2021-10-06 PROCEDURE — 51702 INSERT TEMP BLADDER CATH: CPT

## 2021-10-06 PROCEDURE — 97535 SELF CARE MNGMENT TRAINING: CPT

## 2021-10-06 PROCEDURE — 6360000002 HC RX W HCPCS: Performed by: NURSE PRACTITIONER

## 2021-10-06 PROCEDURE — 2580000003 HC RX 258: Performed by: HOSPITALIST

## 2021-10-06 PROCEDURE — 6370000000 HC RX 637 (ALT 250 FOR IP): Performed by: NURSE PRACTITIONER

## 2021-10-06 PROCEDURE — 97116 GAIT TRAINING THERAPY: CPT

## 2021-10-06 PROCEDURE — 51798 US URINE CAPACITY MEASURE: CPT

## 2021-10-06 PROCEDURE — 1200000000 HC SEMI PRIVATE

## 2021-10-06 PROCEDURE — 6370000000 HC RX 637 (ALT 250 FOR IP): Performed by: HOSPITALIST

## 2021-10-06 PROCEDURE — 97110 THERAPEUTIC EXERCISES: CPT

## 2021-10-06 PROCEDURE — 36415 COLL VENOUS BLD VENIPUNCTURE: CPT

## 2021-10-06 PROCEDURE — 80048 BASIC METABOLIC PNL TOTAL CA: CPT

## 2021-10-06 RX ADMIN — Medication 2000 UNITS: at 08:29

## 2021-10-06 RX ADMIN — ISOSORBIDE DINITRATE 10 MG: 10 TABLET ORAL at 08:29

## 2021-10-06 RX ADMIN — ISOSORBIDE DINITRATE 10 MG: 10 TABLET ORAL at 22:51

## 2021-10-06 RX ADMIN — ATORVASTATIN CALCIUM 20 MG: 10 TABLET, FILM COATED ORAL at 08:29

## 2021-10-06 RX ADMIN — INSULIN LISPRO 2 UNITS: 100 INJECTION, SOLUTION INTRAVENOUS; SUBCUTANEOUS at 18:13

## 2021-10-06 RX ADMIN — PANTOPRAZOLE SODIUM 40 MG: 40 TABLET, DELAYED RELEASE ORAL at 05:46

## 2021-10-06 RX ADMIN — TAMSULOSIN HYDROCHLORIDE 0.4 MG: 0.4 CAPSULE ORAL at 08:29

## 2021-10-06 RX ADMIN — INSULIN LISPRO 1 UNITS: 100 INJECTION, SOLUTION INTRAVENOUS; SUBCUTANEOUS at 22:52

## 2021-10-06 RX ADMIN — SODIUM BICARBONATE 650 MG TABLET 1300 MG: at 08:29

## 2021-10-06 RX ADMIN — SODIUM BICARBONATE 650 MG TABLET 1300 MG: at 15:28

## 2021-10-06 RX ADMIN — HYDRALAZINE HYDROCHLORIDE 10 MG: 10 TABLET, FILM COATED ORAL at 15:28

## 2021-10-06 RX ADMIN — INSULIN LISPRO 1 UNITS: 100 INJECTION, SOLUTION INTRAVENOUS; SUBCUTANEOUS at 13:12

## 2021-10-06 RX ADMIN — CARVEDILOL 18.75 MG: 6.25 TABLET, FILM COATED ORAL at 08:29

## 2021-10-06 RX ADMIN — SODIUM CHLORIDE, PRESERVATIVE FREE 10 ML: 5 INJECTION INTRAVENOUS at 08:30

## 2021-10-06 RX ADMIN — ASPIRIN 81 MG: 81 TABLET, COATED ORAL at 08:29

## 2021-10-06 RX ADMIN — ISOSORBIDE DINITRATE 10 MG: 10 TABLET ORAL at 15:28

## 2021-10-06 RX ADMIN — HEPARIN SODIUM 5000 UNITS: 5000 INJECTION INTRAVENOUS; SUBCUTANEOUS at 22:51

## 2021-10-06 RX ADMIN — HYDRALAZINE HYDROCHLORIDE 10 MG: 10 TABLET, FILM COATED ORAL at 22:51

## 2021-10-06 RX ADMIN — SODIUM CHLORIDE, PRESERVATIVE FREE 10 ML: 5 INJECTION INTRAVENOUS at 22:51

## 2021-10-06 RX ADMIN — HEPARIN SODIUM 5000 UNITS: 5000 INJECTION INTRAVENOUS; SUBCUTANEOUS at 05:46

## 2021-10-06 RX ADMIN — SODIUM BICARBONATE 650 MG TABLET 1300 MG: at 22:51

## 2021-10-06 RX ADMIN — INSULIN LISPRO 1 UNITS: 100 INJECTION, SOLUTION INTRAVENOUS; SUBCUTANEOUS at 08:30

## 2021-10-06 RX ADMIN — HEPARIN SODIUM 5000 UNITS: 5000 INJECTION INTRAVENOUS; SUBCUTANEOUS at 15:28

## 2021-10-06 RX ADMIN — HYDRALAZINE HYDROCHLORIDE 10 MG: 10 TABLET, FILM COATED ORAL at 05:46

## 2021-10-06 RX ADMIN — CARVEDILOL 18.75 MG: 6.25 TABLET, FILM COATED ORAL at 18:12

## 2021-10-06 ASSESSMENT — PAIN SCALES - GENERAL
PAINLEVEL_OUTOF10: 0

## 2021-10-06 NOTE — PROGRESS NOTES
Md notified: Pt had non-injurious \"fall\" pt lowered self to side of bed. Alarm was on and staff responded quickly to assist pt back to bed.

## 2021-10-06 NOTE — PROGRESS NOTES
Occupational Therapy  Facility/Department: [facility] B3 - MED SURG  Daily Treatment Note  NAME: Ruth Land  : 1945  MRN: [id number]    Date of Service: 10/6/2021    Discharge Recommendations:  Subacute/Skilled Nursing Facility       Assessment   Performance deficits / Impairments: Decreased functional mobility ; Decreased ADL status; Decreased cognition;Decreased safe awareness;Decreased strength;Decreased endurance;Decreased balance;Decreased high-level IADLs;Decreased fine motor control;Decreased vision/visual deficit; Decreased coordination  Assessment: Pt agreeable to cotx with PT d/t assist level, cognition, activity luis eduardo and to promote better outcomes than 1: 1 session. Pt cont to require 2 person assist for transfers and mobility, limited by cognition and poor vision. Pt cont to require assist for all ADls, cont to recommend SNF at discharge. OT Education: OT Role;Plan of Care;Transfer Training;Orientation  Patient Education: not an independent learner  Barriers to Learning: cogntition  REQUIRES OT FOLLOW UP: Yes  Activity Tolerance  Activity Tolerance: Patient Tolerated treatment well  Activity Tolerance: 115/84 HR 84 O2 97% on RA  Safety Devices  Safety Devices in place: Yes  Type of devices: Left in bed;Bed alarm in place;Call light within reach;Nurse notified;Gait belt         Patient Diagnosis(es): The primary encounter diagnosis was Acute renal failure superimposed on chronic kidney disease, unspecified CKD stage, unspecified acute renal failure type ([facility] [address] Diagnoses of Urinary tract infection without hematuria, site unspecified and Altered mental status, unspecified altered mental status type were also pertinent to this visit. 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    has a past surgical history that includes Cardiac surgery (2006); Lung surgery (2006); Kidney stone surgery (1996); Eye surgery (Bilateral, 2003); and Colonoscopy. Restrictions  Restrictions/Precautions  Restrictions/Precautions: Up as Tolerated, General Precautions, Fall Risk  Position Activity Restriction  Other position/activity restrictions: [employer] monitor in room, legally blind  Subjective   General  Chart Reviewed: Yes  Patient assessed for rehabilitation services?: Yes  Response to previous treatment: Patient with no complaints from previous session  Family / Caregiver Present: No  Subjective  Subjective: Pt supine, agreeable  General Comment  Comments: RN clears for therapy  Pre Treatment Pain Screening  Intervention List: Patient able to continue with treatment  Vital Signs  Patient Currently in Pain: Denies   Orientation  Orientation  Overall Orientation Status: Impaired  Orientation Level: Oriented to person;Oriented to place;Oriented to situation;Disoriented to time  Objective    ADL  UE Dressing: Maximum assistance  LE Dressing: Maximum assistance  Toileting: Dependent/Total (straight cath by nursing)        Balance  Sitting Balance: Moderate assistance (up to mod A for posterior LOB)  Standing Balance: Dependent/Total (fluctuates from min x 1 to mod A x 2)  Standing Balance  Time: 1-2 min x 2  Activity: transfer to chair, fucntional mobility in room, return to bed per RN  Functional Mobility  Functional - Mobility Device: Rolling Walker  Activity: Other  Assist Level: Dependent/Total  Functional Mobility Comments: Mod A x 2     Transfers  Sit to stand: Dependent/Total;2 Person assistance  Stand to sit: Dependent/Total;2 Person assistance  Transfer Comments: mod A x 2                       Cognition  Overall Cognitive Status: Exceptions  Arousal/Alertness: Delayed responses to stimuli  Following Commands: Follows one step commands with repetition; Follows one step commands with increased time  Attention Span: Attends with cues to redirect  Memory: Decreased recall of biographical Information;Decreased short term memory;Decreased recall of recent events;Decreased recall of precautions;Decreased long term memory  Safety Judgement: Decreased awareness of need for assistance;Decreased awareness of need for safety  Problem Solving: Decreased awareness of errors  Insights: Decreased awareness of deficits  Initiation: Requires cues for some  Sequencing: Requires cues for some     Plan   Plan  Times per week: 3-5x  Specific instructions for Next Treatment: cotx  Current Treatment Recommendations: Balance Training, Functional Mobility Training, Safety Education & Training, Cognitive Reorientation, Self-Care / ADL, Patient/Caregiver Education & Training, Home Management Training, Endurance Training    AM-PAC Score        AM-PAC Inpatient Daily Activity Raw Score: 11 (10/06/21 1603)  AM-PAC Inpatient ADL T-Scale Score : 29.04 (10/06/21 1603)  ADL Inpatient CMS 0-100% Score: 70.42 (10/06/21 1603)  ADL Inpatient CMS G-Code Modifier : CL (10/06/21 1603)    Goals  Short term goals  Time Frame for Short term goals: 1 week by 10/8  Short term goal 1: Pt will complete toileting with Mod A by 10/4-- GOAL NOT MET, dependent 10/6/21  Short term goal 2: Pt will complete LBD with Mod  A-- ongoing 10/6/21  Short term goal 3: Pt will complete grooming seated EOB with Min A-- ongoing 10/6/21  Short term goal 4: Pt will complete UBD seated EOB with Min A-- ongoing 10/6/21  Long term goals  Time Frame for Long term goals : STGs=LTGs  Patient Goals   Patient goals : unable to state       Therapy Time   Individual Concurrent Group Co-treatment   Time In 0940         Time Out 1020         Minutes 40         Timed Code Treatment Minutes: 40 Minutes     If pt discharges prior to next session, this note will serve as discharge summary. See case management note for discharge disposition.    Charu Sanchez OT

## 2021-10-06 NOTE — CARE COORDINATION
Chart review-From [facility]. Plan to return at dc. Re-inserting roth. Urology consult. Possible dc tomorrow.

## 2021-10-06 NOTE — CONSULTS
Consult placed    Who:Dr. Sara Medley  Date:10/6/2021,  Time:12:10 PM        Electronically signed by Delonte Quiroz on 10/6/2021 at 12:10 PM

## 2021-10-06 NOTE — PROGRESS NOTES
Physical Therapy  Facility/Department: [facility] B3 - MED SURG  Daily Treatment Note  NAME: Romy De Los Santos  : 1945  MRN: [id number]    Date of Service: 10/6/2021    Discharge Recommendations:  Subacute/Skilled Nursing Facility   PT Equipment Recommendations  Equipment Needed: No    Assessment   Body structures, Functions, Activity limitations: Decreased functional mobility ; Decreased safe awareness;Decreased balance;Decreased endurance;Decreased strength;Decreased cognition  Assessment: Pt demonstrating bed mobility with modA, t/f with mod/maxA x 2 and amb with modA x2 20'. Pt demonstrates posterior lean in sitting and standing requiring which he sometimes can correct w/maximal cues. Pt will benefit from continued skilled PT in acute care setting to address above deficits. Co-tx collaboration this date with OT staff to safely progress pt toward goals. Pt will have better performance outcomes within a co-treatment than 1:1 session. Treatment Diagnosis: weakness, decreased mobility, decreased cognition  Prognosis: Good  Decision Making: Medium Complexity  Patient Education: Disease Specific: pt educated in general safety, reorientation provided. Pt able to repeat back information but will need reinforcement  Barriers to Learning: confusion  REQUIRES PT FOLLOW UP: Yes  Activity Tolerance  Activity Tolerance: Patient Tolerated treatment well  Activity Tolerance: /64  HR 84  O2 97% RA     Patient Diagnosis(es): The primary encounter diagnosis was Acute renal failure superimposed on chronic kidney disease, unspecified CKD stage, unspecified acute renal failure type ([facility] [address] Diagnoses of Urinary tract infection without hematuria, site unspecified and Altered mental status, unspecified altered mental status type were also pertinent to this visit.      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 ([address] and Type II or 1200)  AM-PAC Inpatient T-Scale Score : 36.74 (10/06/21 1200)  Mobility Inpatient CMS 0-100% Score: 64.91 (10/06/21 1200)  Mobility Inpatient CMS G-Code Modifier : CL (10/06/21 1200)          Goals  Short term goals  Time Frame for Short term goals: 1 week (10/7) unless otherwise specified  Short term goal 1: pt to perform bed mob Supervised -- 10/06: Blue Albrecht supine to/from sit  Short term goal 2: pt to perform transfers CG -- 10/06: mod/max A x 2 sit to/from stand with RW  Short term goal 3: pt to amb with least restrictive or no device 50 ft CG -- 10/06 rw mod A x 2 20'  Short term goal 4: pt to participate in LE Ex 8-10 reps by 10/5 -- 10/06:initiated  Patient Goals   Patient goals : Pt is confused, does not state goals    Plan    Plan  Times per week: 3-5 x wk  Times per day: Daily  Specific instructions for Next Treatment: Progress mobility as tolerated  Current Treatment Recommendations: Strengthening, Transfer Training, Endurance Training, Cognitive Reorientation, Balance Training, Gait Training, Functional Mobility Training, Safety Education & Training, Neuromuscular Re-education, Home Exercise Program, Patient/Caregiver Education & Training, Positioning  Safety Devices  Type of devices:  All fall risk precautions in place, Gait belt, Bed alarm in place, Patient at risk for falls, Nurse notified, Call light within reach, Left in bed     Therapy Time   Individual Concurrent Group Co-treatment   Time In 0940         Time Out 1020         Minutes 40         Timed Code Treatment Minutes: [address]

## 2021-10-06 NOTE — CONSULTS
10/06/2021     PT/INR:  No results found for: PROTIME, INR  PTT:  No results found for: APTT[APTT        Impression/Plan:   Acute urinary retention requiring placement of Ryan catheter  UTI  ADRIAN    --Continue Flomax-discharged with this medication  -- continue abx per primary   --Keep Ryan in while patient has ADRIAN-allow the bladder to drain and closer to discharge can attempt a trial of void-if the patient fails trial void please reinsert Ryan and discharge with this in place and we will see him 7 to 10 days later for trial of void, if he passes trial of void then we will see him as outpt in few weeks to establish care     Signing off-call with questions    Thank you for the opportunity to be involved in the care of this patient - please call with questions    Héctor Tucker MD  [facility]  Office - [phone]

## 2021-10-06 NOTE — PROGRESS NOTES
[facility]    [facility]. [facility]              [phone]                         Interval History and plan:      Bicarbonate is stable improving with p.o. bicarbonate  Has no pedal edema  Creatinine continues to go up to 3.8  Ryan placed and seen by urology for possible urinary retention    Plan:  Multiple changes made  Has appointment    Hopefully creatinine will start coming down because of change in the coreg dose, and slight improvement in blood pressure                   Assessment :     Acute Kidney Injury on CKD 4  ADRIAN likely due to -prerenal etiology  Cr on consultation 3.3  Baseline Cr- 3  CKD likely due to diabetes hypertension recurrent ADRIAN  He was admitted to [facility] on 6/29- 7/8/21- with ADRIAN  Cr at DC was 3.38 from peak of 4.75(pre-renal)  UA- small blood, small nitrite, wbc 21-50  Renal Imaging:-10/21-right kidney 8.2 cm, left 8.3 cm, no hydronephrosis  Echo: pending  7/21- nromal EF, RV pressure < 35 mmHg      Hypertension   BP: (110-125)/(58-71)  Pulse:  [71-86]   BP goal inpatient [id number] systolic inpatient    Anemia of CKD  Iron sat normal  Hb 9.5 on consult    Acidosis  Oral bicarb ok    BPH  History of renal stones  CAD  Carotid artery occlusion  Diabetes mellitus        [facility] would like to thank Jonathan Sherwood MD   for opportunity to serve this patient      Please call with questions at-   24 Hrs Answering service [phone] or  7 am- 5 pm via Perfect serve or cell phone  Valentin Gonzalez MD          CC/reason for consult :     ADRIAN     HPI :     Shayla Wall is a 78 y.o. male presented to   the hospital on 9/30/2021 with altered mental status from nursing facility. He is unable to provide details. Per reports he has had bright red blood per rectum. He was brought to the emergency room where work-up showed possible inferolateral ischemia on the heart and had QTc prolongation. CT scan of the head was done which was negative.   He is scheduled to do MRI but has not done yet. He was found to have metabolic acidosis, ADRIAN. Also elevation of troponin. He had a UA done which showed possible infection, culture is pending also blood culture is sent and results are pending. He is currently on IV antibiotics. We are consulted for ADRIAN and related issues    ROS:     Positives Listed Bold. All other remaining systems are negative. Constitutional:  fever, chills, weakness, weight change, fatigue,      Skin:  rash, pruritus, hair loss, bruising, dry skin, petechiae. Head, Face, Neck   headaches, swelling,  cervical adenopathy. Respiratory: shortness of breath, cough, or wheezing  Cardiovascular: chest pain, palpitations, dizzy, edema  Gastrointestinal: nausea, vomiting, diarrhea, constipation,belly pain    Yellow skin, blood in stool  Musculoskeletal:  back pain, muscle weakness, gait problems,       joint pain or swelling. Genitourinary:  dysuria, poor urine flow, flank pain, blood in urine  Neurologic:  vertigo, TIA'S, syncope, seizures, focal weakness  Psychosocial:  insomnia, anxiety, or depression. Additional positive findings: -         PMH/PSH/SH/Family History:     Past Medical History:   Diagnosis Date    CAD (coronary artery disease)     Chronic renal disease     Dr. 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 He has never used smokeless tobacco. He reports that he does not drink alcohol and does not use drugs. family history includes Diabetes in his mother.          Medication:     Current Facility-Administered Medications: carvedilol (COREG) tablet 18.75 mg, 18.75 mg, Oral, BID WC  sodium bicarbonate tablet 1,300 mg, 1,300 mg, Oral, TID  hydrALAZINE (APRESOLINE) tablet 10 mg, 10 mg, Oral, 3 times per day  insulin lispro (HUMALOG) injection vial 0-6 Units, 0-6 Units, SubCUTAneous, TID WC  insulin lispro (HUMALOG) injection vial 0-3 Units, 0-3 Units, SubCUTAneous, Nightly  epoetin long-epbx (RETACRIT) injection 5,000 Units, 5,000 Units, SubCUTAneous, Weekly  tamsulosin (FLOMAX) capsule 0.4 mg, 0.4 mg, Oral, Daily  isosorbide dinitrate (ISORDIL) tablet 10 mg, 10 mg, Oral, TID  heparin (porcine) injection 5,000 Units, 5,000 Units, SubCUTAneous, 3 times per day  pantoprazole (PROTONIX) tablet 40 mg, 40 mg, Oral, QAM AC  [Held by provider] insulin glargine (LANTUS) injection vial 14 Units, 14 Units, SubCUTAneous, Nightly  atorvastatin (LIPITOR) tablet 20 mg, 20 mg, Oral, Daily  aspirin EC tablet 81 mg, 81 mg, Oral, Daily  sodium chloride flush 0.9 % injection 10 mL, 10 mL, IntraVENous, 2 times per day  sodium chloride flush 0.9 % injection 10 mL, 10 mL, IntraVENous, PRN  0.9 % sodium chloride infusion, 25 mL, IntraVENous, PRN  magnesium sulfate 2000 mg in 50 mL IVPB premix, 2,000 mg, IntraVENous, PRN  promethazine (PHENERGAN) tablet 12.5 mg, 12.5 mg, Oral, Q6H PRN **OR** ondansetron (ZOFRAN) injection 4 mg, 4 mg, IntraVENous, Q6H PRN  senna (SENOKOT) tablet 8.6 mg, 1 tablet, Oral, Daily PRN  perflutren lipid microspheres (DEFINITY) injection 1.65 mg, 1.5 mL, IntraVENous, ONCE PRN  glucose (GLUTOSE) 40 % oral gel 15 g, 15 g, Oral, PRN  dextrose 50 % IV solution, 12.5 g, IntraVENous, PRN  glucagon (rDNA) injection 1 mg, 1 mg, IntraMUSCular, PRN  dextrose 5 % solution, 100 mL/hr, IntraVENous, PRN  Vitamin D (CHOLECALCIFEROL) tablet 2,000 Units, 2,000 Units, Oral, Daily       Vitals :     Vitals:    10/06/21 1528   BP: 116/60   Pulse: 78   Resp: 18   Temp: 97.5 °F (36.4 °C)   SpO2: 100%       I & O : Intake/Output Summary (Last 24 hours) at 10/6/2021 1542  Last data filed at 10/6/2021 1424  Gross per 24 hour   Intake 1280 ml   Output 600 ml   Net 680 ml        Physical Examination :     General appearance: male in NAD, alert and awake. HEENT: EOM intact, no icterus. Trachea is midline. Neck : No mass, appears symmetrical, no JVD   Respiratory: Respiratory effort appears normal, no wheeze, no crackles  Cardiovascular: Ausculation- No M/R/G, no edema  Abdomen: No visible mass or tenderness  Musculoskeletal:  No clubbing,cyanosis, joints with no swelling or deformity. Skin:no rashes, ulcers, induration, no jaundice. Neuro: face symmetric, no focal deficits. Appropriate responses.  CN 2-12 grossly intact    Additional findings:-        LABS:     Recent Labs     10/04/21  0816 10/06/21  0847   WBC 5.7 4.7   HGB 9.3* 8.8*   HCT 27.5* 25.3*    104*     Recent Labs     10/04/21  0816 10/05/21  1059 10/06/21  0847    137 141   K 3.6 3.9 4.2   * 111* 112*   CO2 19* 15* 19*   BUN 33* 32* 35*   CREATININE 3.4* 3.7* 3.8*   GLUCOSE 107* 101* 169*

## 2021-10-06 NOTE — PROGRESS NOTES
Hospitalist Progress Note      PCP: Jack Garcia    Date of Admission: 9/30/2021    Chief Complaint: [address] Course:     H& P reviewed     Subjective:    Pt awake and denied any new complaint.  Pt has urinary retention getting intermittent cath      Medications:  Reviewed    Infusion Medications    sodium chloride Stopped (10/02/21 0344)    dextrose       Scheduled Medications    carvedilol  18.75 mg Oral BID WC    sodium bicarbonate  1,300 mg Oral TID    hydrALAZINE  10 mg Oral 3 times per day    insulin lispro  0-6 Units SubCUTAneous TID WC    insulin lispro  0-3 Units SubCUTAneous Nightly    epoetin long-epbx  5,000 Units SubCUTAneous Weekly    tamsulosin  0.4 mg Oral Daily    isosorbide dinitrate  10 mg Oral TID    heparin (porcine)  5,000 Units SubCUTAneous 3 times per day    pantoprazole  40 mg Oral QAM AC    [Held by provider] insulin glargine  14 Units SubCUTAneous Nightly    atorvastatin  20 mg Oral Daily    aspirin  81 mg Oral Daily    sodium chloride flush  10 mL IntraVENous 2 times per day    Vitamin D  2,000 Units Oral Daily     PRN Meds: sodium chloride flush, sodium chloride, magnesium sulfate, promethazine **OR** ondansetron, senna, perflutren lipid microspheres, glucose, dextrose, glucagon (rDNA), dextrose      Intake/Output Summary (Last 24 hours) at 10/6/2021 1058  Last data filed at 10/6/2021 0917  Gross per 24 hour   Intake 1760 ml   Output 300 ml   Net 1460 ml       Physical Exam Performed:    /71   Pulse 86   Temp 98.4 °F (36.9 °C) (Oral)   Resp 16   Ht 5' 8\" (1.727 m)   Wt 134 lb 7.7 oz (61 kg)   SpO2 100%   BMI 20.45 kg/m²       Labs:   Recent Labs     10/04/21  0816 10/06/21  0847   WBC 5.7 4.7   HGB 9.3* 8.8*   HCT 27.5* 25.3*    104*     Recent Labs     10/04/21  0816 10/05/21  1059 10/06/21  0847    137 141   K 3.6 3.9 4.2   * 111* 112*   CO2 19* 15* 19*   BUN 33* 32* 35*   CREATININE 3.4* 3.7* 3.8*   CALCIUM 8.3 8.0* 8.0*     Recent Labs     10/04/21  0816   AST 11*   ALT 8*   BILITOT 0.3   ALKPHOS 66     No results for input(s): INR in the last 72 hours. No results for input(s): Patrisha Meigs in the last 72 hours. Urinalysis:      Lab Results   Component Value Date    NITRU Negative 09/30/2021    WBCUA 21-50 09/30/2021    BACTERIA 3+ 09/30/2021    RBCUA 5-10 09/30/2021    BLOODU SMALL 09/30/2021    SPECGRAV 1.015 09/30/2021    GLUCOSEU Negative 09/30/2021       Radiology:  NM Cardiac Stress Test Nuclear Imaging   Final Result      VL Extremity Venous Left   Final Result      XR CHEST PORTABLE   Final Result   1. Congestive heart failure. US RETROPERITONEAL COMPLETE   Final Result   Grossly unremarkable ultrasound of the kidneys and urinary bladder. No   hydronephrosis. CT HEAD WO CONTRAST   Final Result   No acute intracranial abnormality. Small right mastoid effusion                 Assessment/Plan:    Active Hospital Problems    Diagnosis     Elevated troponin [R77.8]     Cardiomyopathy ([facility] [I42.9]     Normocytic anemia [D64.9]     Acute renal failure superimposed on chronic kidney disease (HCC) [N17.9, N18.9]     Acute UTI [N39.0]     Acute encephalopathy [G93.40]     DM (diabetes mellitus), type 2 ([address] [E11.9]     Hypertension [I10]     Hyperlipidemia [E78.5]     CAD (coronary artery disease) [I25.10]      BRBPR with anemia: no acute GIB noted inpatient with no need for endoscopy at this time  per GI. Continue to follow clinically for any reoccurrence. hgb stable          Acute metab encephalopathy: likely due to UTI, worsening of underlying dementia. Neuro assisted MRI  Brain-unable to obtain d/t sternal wires. Continue supportive care, avoid sedatives    ADRIAN on CKD : likely prerenal , had improved with IVF. nephro assisting. Cr uptrending to 3.8 on lab. nephro assisting. Monitor and avoid nephrotoxins      UTI with possible acute mastoditis:UC grew skin/urogenital rick. Completed treatment with IV ceftriaxone and IV Zyvox, d/obey on 10/4. DMII with hypoglycemia: Lantus held with no further hypoglycemia. Continue SSI . Monitor and adjust insulin doses as needed      Cardiomyopathy : Ef 15-20% on echo. continue beta-blocker, hydralazine and nitrate combination. No ACE/ARB/ARNI/aldosterone antagonist due to renal insufficiency. CAD with elevated trop : cardio assisting . Stress test abnormal-EF 32%, large scar with no ischemia. Medical management recommended per cardio. Continue BB, aspirin, statin        Anemia: likely anemia of chronic disease due to CKD. on retacrit weekly      Urinary retention: persistent despite getting flomax and getting intermittent straight cath. UTI has been treated. Place roth. Consult urology    DVT Prophylaxis: scds  Diet: ADULT DIET; Regular; 4 carb choices (60 gm/meal);  Low Sodium (2 gm)  Code Status: Full Code    PT/OT Eval Status: consulted    Dispo - likely when renal function stabilizes to SNF     Lennox Hook, MD

## 2021-10-06 NOTE — ADT AUTH CERT
Patient Demographics    Name Patient ID SSN Gender Identity Birth Date   Kannan Villa [id number]  Male [birth year] (78 yrs)   Address Phone Email Employer    [address]   Marina Curry [phone] (S)   [phone] (W) [email or web address] RETIRED    [place] [employer] Occupation Emp Status    [employer] White (non-) -- Retired    Reg Status PCP Date Last Verified Next Review Date    Verified Marquita [place]  [phone] 09/30/21 10/30/21    Admission Date Discharge Date Admitting Provider     21 -- Gurpreet Diamond MD     Marital Status Confucianism       --      Emergency Contact 1   Kimberley Calhoun (I)   [phone] Keesha Bhatt)   [facility] [de-identified]  CV[employer] Subscriber Name/Sex/Relation Subscriber  Subscriber Address/Phone Subscriber Emp/Emp Phone   1.  [facility] MEDICARE   [id number] Sancho To - Male   (Self) 1945 [address], [address]   [phone](O)   [phone](K)    Utilization Reviews       Urinary Tract Infection (UTI) - Care Day 3 (10/2/2021) by Marisa Aguirre RN       Review Entered Review Status   10/4/2021 16:19 Completed      Criteria Review      Care Day: 3 Care Date: 10/2/2021 Level of Care: Inpatient Floor    Guideline Day 3    Clinical Status    ( ) * Hemodynamic stability    10/4/2021 4:19 PM EDT by Rebecca Samuel      wbc  5.1  hgb 9.1  platelets 508    86.3  20  70   144/70  94%ra    ( ) * Mental status at baseline    10/4/2021 4:19 PM EDT by Christopher Sullivan remains confused    ( ) * Antibiotic regimen for next level of care established    (X) * Urine output adequate    ( ) * Renal function at baseline or acceptable for next level of care    10/4/2021 4:19 PM EDT by Rebecca Samuel      lasix cont to be held per Renal note    (X) * Pain absent or managed    (X) * Oral intake adequate    (X) * Afebrile or temperature acceptable for next level of care    (X) * Vomiting absent    ( ) * Discharge plans and education understood    Activity    ( ) * Ambulatory or acceptable for next level of care    Routes    ( ) * Oral hydration    ( ) * Oral medications or regimen acceptable for next level of care    10/4/2021 4:19 PM EDT by Fay Guerrero      coreg 25mg bid  apresoline 25mg qd    ( ) * Oral diet or acceptable for next level of care    Interventions    (X) Renal function tests, urinalysis    10/4/2021 4:19 PM EDT by Fay dobson 3.1  bun 41    Medications    (X) Antibiotics    10/4/2021 4:19 PM EDT by Fay Guerrero      rocephin 1000mg iv q24hrs  zyvox 600mg IV bid    * Milestone      Urinary Tract Infection (UTI) - Care Day 2 (10/1/2021) by Arabella Conley RN       Review Entered Review Status   10/4/2021 16:13 Completed      Criteria Review      Care Day: 2 Care Date: 10/1/2021 Level of Care: Inpatient Floor    Guideline Day 2    Level Of Care    ( ) Floor    10/4/2021 4:13 PM EDT by Fay Guerrero      79 yo male coronary disease, diabetes, hyperlipidemia, hypertension, and urinary tract infection is admitted with confusion. Per GI: continue asa; Monitor for evidence of gross bleeding.     cxr:  shows possible congestive heart failure    Clinical Status    ( ) * Hypotension absent    10/4/2021 4:13 PM EDT by Fay Guerrero      10  19  653/33  98% ra    ( ) * Mental status at baseline    10/4/2021 4:13 PM EDT by Fay Guerrero      confused    (X) * Afebrile or fever improved    10/4/2021 4:13 PM EDT by Fay Guerrero      98.1    (X) * Vomiting absent or improved    Activity    ( ) * Ambulatory    Routes    (X) IV medications    10/4/2021 4:13 PM EDT by Fay Guerrero      protonix 40mg iv qd    Interventions    ( ) * Reversible urinary system abnormality (eg, obstruction, abscess) absent, addressed, or to be treated at next level of care    (X) WBC    10/4/2021 4:13 PM EDT by Fay Guerrero      4.8    ( ) Renal function tests    10/4/2021 4:13 PM EDT by Fay dobson 3.0  bun 44   trop 0.08    renal consult Medications    (X) Antibiotics    10/4/2021 4:13 PM EDT by Nasir Gar      rocephin 1000mg iv q24hrs  zyvox 600mg bid    * Milestone   Additional Notes   10/1   Per Renal:   Plan:   Advanced CKD but his creatinine is now at baseline unlikely to cause of altered mental status   We will continue to follow without fluids since he is at baseline renal function at this time   Agree with holding torsemide   Will check for viral serologies SPEP and UPEP and protein creatinine ratio

## 2021-10-07 LAB
ANION GAP SERPL CALCULATED.3IONS-SCNC: 11 MMOL/L (ref 3–16)
BASOPHILS ABSOLUTE: 0 K/UL (ref 0–0.2)
BASOPHILS RELATIVE PERCENT: 0.7 %
BUN BLDV-MCNC: 38 MG/DL (ref 7–20)
CALCIUM SERPL-MCNC: 8.2 MG/DL (ref 8.3–10.6)
CHLORIDE BLD-SCNC: 108 MMOL/L (ref 99–110)
CO2: 19 MMOL/L (ref 21–32)
CREAT SERPL-MCNC: 4.1 MG/DL (ref 0.8–1.3)
EOSINOPHILS ABSOLUTE: 0.2 K/UL (ref 0–0.6)
EOSINOPHILS RELATIVE PERCENT: 4.5 %
GFR AFRICAN AMERICAN: 17
GFR NON-AFRICAN AMERICAN: 14
GLUCOSE BLD-MCNC: 142 MG/DL (ref 70–99)
GLUCOSE BLD-MCNC: 153 MG/DL (ref 70–99)
GLUCOSE BLD-MCNC: 218 MG/DL (ref 70–99)
GLUCOSE BLD-MCNC: 220 MG/DL (ref 70–99)
GLUCOSE BLD-MCNC: 77 MG/DL (ref 70–99)
GLUCOSE BLD-MCNC: 78 MG/DL (ref 70–99)
GLUCOSE BLD-MCNC: 86 MG/DL (ref 70–99)
HBV SURFACE AB TITR SER: <3.5 MIU/ML
HCT VFR BLD CALC: 25 % (ref 40.5–52.5)
HEMOGLOBIN: 8.6 G/DL (ref 13.5–17.5)
HEPATITIS B SURFACE ANTIGEN INTERPRETATION: NORMAL
HEPATITIS C ANTIBODY INTERPRETATION: NORMAL
INR BLD: 1.08 (ref 0.88–1.12)
LYMPHOCYTES ABSOLUTE: 0.7 K/UL (ref 1–5.1)
LYMPHOCYTES RELATIVE PERCENT: 14 %
MCH RBC QN AUTO: 32.6 PG (ref 26–34)
MCHC RBC AUTO-ENTMCNC: 34.6 G/DL (ref 31–36)
MCV RBC AUTO: 94.2 FL (ref 80–100)
MONOCYTES ABSOLUTE: 0.4 K/UL (ref 0–1.3)
MONOCYTES RELATIVE PERCENT: 7 %
NEUTROPHILS ABSOLUTE: 3.9 K/UL (ref 1.7–7.7)
NEUTROPHILS RELATIVE PERCENT: 73.8 %
PDW BLD-RTO: 15.5 % (ref 12.4–15.4)
PERFORMED ON: ABNORMAL
PERFORMED ON: NORMAL
PERFORMED ON: NORMAL
PLATELET # BLD: 103 K/UL (ref 135–450)
PMV BLD AUTO: 9 FL (ref 5–10.5)
POTASSIUM SERPL-SCNC: 4.2 MMOL/L (ref 3.5–5.1)
PROTHROMBIN TIME: 12.3 SEC (ref 9.9–12.7)
RBC # BLD: 2.65 M/UL (ref 4.2–5.9)
SODIUM BLD-SCNC: 138 MMOL/L (ref 136–145)
TOTAL CK: 64 U/L (ref 39–308)
WBC # BLD: 5.3 K/UL (ref 4–11)

## 2021-10-07 PROCEDURE — 6370000000 HC RX 637 (ALT 250 FOR IP): Performed by: INTERNAL MEDICINE

## 2021-10-07 PROCEDURE — 87340 HEPATITIS B SURFACE AG IA: CPT

## 2021-10-07 PROCEDURE — 6360000002 HC RX W HCPCS: Performed by: NURSE PRACTITIONER

## 2021-10-07 PROCEDURE — 2580000003 HC RX 258: Performed by: INTERNAL MEDICINE

## 2021-10-07 PROCEDURE — 97530 THERAPEUTIC ACTIVITIES: CPT

## 2021-10-07 PROCEDURE — 1200000000 HC SEMI PRIVATE

## 2021-10-07 PROCEDURE — 80048 BASIC METABOLIC PNL TOTAL CA: CPT

## 2021-10-07 PROCEDURE — 86704 HEP B CORE ANTIBODY TOTAL: CPT

## 2021-10-07 PROCEDURE — 85610 PROTHROMBIN TIME: CPT

## 2021-10-07 PROCEDURE — 6370000000 HC RX 637 (ALT 250 FOR IP): Performed by: NURSE PRACTITIONER

## 2021-10-07 PROCEDURE — 86803 HEPATITIS C AB TEST: CPT

## 2021-10-07 PROCEDURE — 82550 ASSAY OF CK (CPK): CPT

## 2021-10-07 PROCEDURE — 6370000000 HC RX 637 (ALT 250 FOR IP): Performed by: HOSPITALIST

## 2021-10-07 PROCEDURE — 85025 COMPLETE CBC W/AUTO DIFF WBC: CPT

## 2021-10-07 PROCEDURE — 86706 HEP B SURFACE ANTIBODY: CPT

## 2021-10-07 PROCEDURE — 36415 COLL VENOUS BLD VENIPUNCTURE: CPT

## 2021-10-07 PROCEDURE — 97116 GAIT TRAINING THERAPY: CPT

## 2021-10-07 PROCEDURE — 2580000003 HC RX 258: Performed by: HOSPITALIST

## 2021-10-07 RX ORDER — SODIUM CHLORIDE, SODIUM GLUCONATE, SODIUM ACETATE, POTASSIUM CHLORIDE AND MAGNESIUM CHLORIDE 526; 502; 368; 37; 30 MG/100ML; MG/100ML; MG/100ML; MG/100ML; MG/100ML
INJECTION, SOLUTION INTRAVENOUS CONTINUOUS
Status: DISCONTINUED | OUTPATIENT
Start: 2021-10-07 | End: 2021-10-08

## 2021-10-07 RX ADMIN — SODIUM BICARBONATE 650 MG TABLET 1300 MG: at 15:27

## 2021-10-07 RX ADMIN — HYDRALAZINE HYDROCHLORIDE 10 MG: 10 TABLET, FILM COATED ORAL at 15:26

## 2021-10-07 RX ADMIN — ISOSORBIDE DINITRATE 10 MG: 10 TABLET ORAL at 22:25

## 2021-10-07 RX ADMIN — HEPARIN SODIUM 5000 UNITS: 5000 INJECTION INTRAVENOUS; SUBCUTANEOUS at 06:37

## 2021-10-07 RX ADMIN — CARVEDILOL 18.75 MG: 6.25 TABLET, FILM COATED ORAL at 17:05

## 2021-10-07 RX ADMIN — ISOSORBIDE DINITRATE 10 MG: 10 TABLET ORAL at 08:53

## 2021-10-07 RX ADMIN — HEPARIN SODIUM 5000 UNITS: 5000 INJECTION INTRAVENOUS; SUBCUTANEOUS at 15:27

## 2021-10-07 RX ADMIN — TAMSULOSIN HYDROCHLORIDE 0.4 MG: 0.4 CAPSULE ORAL at 08:53

## 2021-10-07 RX ADMIN — HYDRALAZINE HYDROCHLORIDE 10 MG: 10 TABLET, FILM COATED ORAL at 06:37

## 2021-10-07 RX ADMIN — ASPIRIN 81 MG: 81 TABLET, COATED ORAL at 08:53

## 2021-10-07 RX ADMIN — SODIUM BICARBONATE 650 MG TABLET 1300 MG: at 22:25

## 2021-10-07 RX ADMIN — Medication 2000 UNITS: at 08:52

## 2021-10-07 RX ADMIN — SODIUM CHLORIDE, SODIUM GLUCONATE, SODIUM ACETATE, POTASSIUM CHLORIDE AND MAGNESIUM CHLORIDE: 526; 502; 368; 37; 30 INJECTION, SOLUTION INTRAVENOUS at 12:01

## 2021-10-07 RX ADMIN — CARVEDILOL 18.75 MG: 6.25 TABLET, FILM COATED ORAL at 08:53

## 2021-10-07 RX ADMIN — HYDRALAZINE HYDROCHLORIDE 10 MG: 10 TABLET, FILM COATED ORAL at 22:25

## 2021-10-07 RX ADMIN — ATORVASTATIN CALCIUM 20 MG: 10 TABLET, FILM COATED ORAL at 08:53

## 2021-10-07 RX ADMIN — INSULIN LISPRO 1 UNITS: 100 INJECTION, SOLUTION INTRAVENOUS; SUBCUTANEOUS at 22:25

## 2021-10-07 RX ADMIN — SODIUM BICARBONATE 650 MG TABLET 1300 MG: at 08:52

## 2021-10-07 RX ADMIN — SODIUM CHLORIDE, PRESERVATIVE FREE 10 ML: 5 INJECTION INTRAVENOUS at 08:56

## 2021-10-07 RX ADMIN — PANTOPRAZOLE SODIUM 40 MG: 40 TABLET, DELAYED RELEASE ORAL at 06:37

## 2021-10-07 RX ADMIN — ISOSORBIDE DINITRATE 10 MG: 10 TABLET ORAL at 15:26

## 2021-10-07 ASSESSMENT — PAIN SCALES - GENERAL
PAINLEVEL_OUTOF10: 0

## 2021-10-07 NOTE — PROGRESS NOTES
[facility] NEPHROLOGY    [facility]              [phone]                         Interval History and plan:      Creatinine continues to go up  Blood pressure is okay  Has very concentrated urine      Plan:  Renal function continues to get worse  We will try gentle hydration to see if that helps  Will be careful and give only 50 mL/h and ask nurse to us for edema and swelling is obtained 50ml/hr and watch for edema/sob  He appears to have very advanced CKD  His estimated GFR is 14 mL however if he has a lot of muscle mass loss that tends to be inaccurate  May need to start dialysis                     Assessment :     Acute Kidney Injury on CKD 4  ADRIAN likely due to -prerenal etiology  Cr on consultation 3.3  Baseline Cr- 3  CKD likely due to diabetes hypertension recurrent ADRIAN  He was admitted to [facility] on 6/29- 7/8/21- with ADRIAN  Cr at [place] was 3.38 from peak of 4.75(pre-renal)  UA- small blood, small nitrite, wbc 21-50  Renal Imaging:-10/21-right kidney 8.2 cm, left 8.3 cm, no hydronephrosis  Echo: 9/21-EF 15 to 47%, grade 2 diastolic dysfunction  7/48- nromal EF, RV pressure < 35 mmHg      Hypertension   BP: (117-138)/(58-74)  Pulse:  [83-89]   BP goal inpatient 324-530 systolic inpatient    Anemia of CKD  Iron sat normal  Hb 9.5 on consult    Acidosis  Oral bicarb ok    BPH  History of renal stones  CAD  Carotid artery occlusion  Diabetes mellitus        [facility] Nephrology would like to thank Dayron Hernandez MD   for opportunity to serve this patient      Please call with questions at-   24 Hrs Answering service [phone] or  7 am- 5 pm via Perfect serve or cell phone  Nathanael Diaz MD          CC/reason for consult :     ADRIAN     HPI :     Melissa Quintana is a 78 y.o. male presented to   the hospital on 9/30/2021 with altered mental status from nursing facility. He is unable to provide details. Per reports he has had bright red blood per rectum.   He was brought to smokeless tobacco. He reports that he does not drink alcohol and does not use drugs. family history includes Diabetes in his mother.          Medication:     Current Facility-Administered Medications: electrolyte-A (PLASMALYTE-A) solution, , IntraVENous, Continuous  carvedilol (COREG) tablet 18.75 mg, 18.75 mg, Oral, BID WC  sodium bicarbonate tablet 1,300 mg, 1,300 mg, Oral, TID  hydrALAZINE (APRESOLINE) tablet 10 mg, 10 mg, Oral, 3 times per day  insulin lispro (HUMALOG) injection vial 0-6 Units, 0-6 Units, SubCUTAneous, TID WC  insulin lispro (HUMALOG) injection vial 0-3 Units, 0-3 Units, SubCUTAneous, Nightly  epoetin long-epbx (RETACRIT) injection 5,000 Units, 5,000 Units, SubCUTAneous, Weekly  tamsulosin (FLOMAX) capsule 0.4 mg, 0.4 mg, Oral, Daily  isosorbide dinitrate (ISORDIL) tablet 10 mg, 10 mg, Oral, TID  heparin (porcine) injection 5,000 Units, 5,000 Units, SubCUTAneous, 3 times per day  pantoprazole (PROTONIX) tablet 40 mg, 40 mg, Oral, QAM AC  [Held by provider] insulin glargine (LANTUS) injection vial 14 Units, 14 Units, SubCUTAneous, Nightly  atorvastatin (LIPITOR) tablet 20 mg, 20 mg, Oral, Daily  aspirin EC tablet 81 mg, 81 mg, Oral, Daily  sodium chloride flush 0.9 % injection 10 mL, 10 mL, IntraVENous, 2 times per day  sodium chloride flush 0.9 % injection 10 mL, 10 mL, IntraVENous, PRN  0.9 % sodium chloride infusion, 25 mL, IntraVENous, PRN  magnesium sulfate 2000 mg in 50 mL IVPB premix, 2,000 mg, IntraVENous, PRN  promethazine (PHENERGAN) tablet 12.5 mg, 12.5 mg, Oral, Q6H PRN **OR** ondansetron (ZOFRAN) injection 4 mg, 4 mg, IntraVENous, Q6H PRN  senna (SENOKOT) tablet 8.6 mg, 1 tablet, Oral, Daily PRN  perflutren lipid microspheres (DEFINITY) injection 1.65 mg, 1.5 mL, IntraVENous, ONCE PRN  glucose (GLUTOSE) 40 % oral gel 15 g, 15 g, Oral, PRN  dextrose 50 % IV solution, 12.5 g, IntraVENous, PRN  glucagon (rDNA) injection 1 mg, 1 mg, IntraMUSCular, PRN  dextrose 5 % solution, 100 mL/hr, IntraVENous, PRN  Vitamin D (CHOLECALCIFEROL) tablet 2,000 Units, 2,000 Units, Oral, Daily       Vitals :     Vitals:    10/07/21 0956   BP: (!) 117/58   Pulse: 83   Resp:    Temp:    SpO2: 96%       I & O :       Intake/Output Summary (Last 24 hours) at 10/7/2021 1123  Last data filed at 10/7/2021 4429  Gross per 24 hour   Intake 480 ml   Output 425 ml   Net 55 ml        Physical Examination :     General appearance: male in NAD, alert and awake. HEENT: EOM intact, no icterus. Trachea is midline. Neck : No mass, appears symmetrical, no JVD   Respiratory: Respiratory effort appears normal, no wheeze, no crackles  Cardiovascular: Ausculation- No M/R/G, no edema  Abdomen: No visible mass or tenderness  Musculoskeletal:  No clubbing,cyanosis, joints with no swelling or deformity. Skin:no rashes, ulcers, induration, no jaundice. Neuro: face symmetric, no focal deficits. Appropriate responses.  CN 2-12 grossly intact    Additional findings:-        LABS:     Recent Labs     10/06/21  0847 10/07/21  0745   WBC 4.7 5.3   HGB 8.8* 8.6*   HCT 25.3* 25.0*   * 103*     Recent Labs     10/05/21  1059 10/06/21  0847 10/07/21  0745    141 138   K 3.9 4.2 4.2   * 112* 108   CO2 15* 19* 19*   BUN 32* 35* 38*   CREATININE 3.7* 3.8* 4.1*   GLUCOSE 101* 169* 77

## 2021-10-07 NOTE — PROGRESS NOTES
Hospitalist Progress Note      PCP: Karen Thomas    Date of Admission: 9/30/2021    Chief Complaint: [address] Course:     H& P reviewed     Subjective: Ryan placed yesterday. Patient seen sitting comfortably in chair and denies any complaints. Cr uptrending on lab today. Medications:  Reviewed    Infusion Medications    sodium chloride Stopped (10/02/21 0344)    dextrose       Scheduled Medications    carvedilol  18.75 mg Oral BID WC    sodium bicarbonate  1,300 mg Oral TID    hydrALAZINE  10 mg Oral 3 times per day    insulin lispro  0-6 Units SubCUTAneous TID WC    insulin lispro  0-3 Units SubCUTAneous Nightly    epoetin long-epbx  5,000 Units SubCUTAneous Weekly    tamsulosin  0.4 mg Oral Daily    isosorbide dinitrate  10 mg Oral TID    heparin (porcine)  5,000 Units SubCUTAneous 3 times per day    pantoprazole  40 mg Oral QAM AC    [Held by provider] insulin glargine  14 Units SubCUTAneous Nightly    atorvastatin  20 mg Oral Daily    aspirin  81 mg Oral Daily    sodium chloride flush  10 mL IntraVENous 2 times per day    Vitamin D  2,000 Units Oral Daily     PRN Meds: sodium chloride flush, sodium chloride, magnesium sulfate, promethazine **OR** ondansetron, senna, perflutren lipid microspheres, glucose, dextrose, glucagon (rDNA), dextrose      Intake/Output Summary (Last 24 hours) at 10/7/2021 1011  Last data filed at 10/7/2021 0952  Gross per 24 hour   Intake 480 ml   Output 425 ml   Net 55 ml       Physical Exam Performed:    BP (!) 117/58   Pulse 83   Temp 97.7 °F (36.5 °C) (Oral)   Resp 16   Ht 5' 8\" (1.727 m)   Wt 137 lb 9.1 oz (62.4 kg)   SpO2 96%   BMI 20.92 kg/m²      General appearance: No apparent distress, appears stated age and cooperative. Legally blind  HEENT: Pupils equal, round, Conjunctivae/corneas clear. Neck: Supple, with full range of motion. No jugular venous distention. Trachea midline. Respiratory:  Normal respiratory effort. Clear to auscultation, bilaterally without Rales/Wheezes/Rhonchi. Cardiovascular: Regular rate and rhythm with normal S1/S2 without murmurs, rubs or gallops. Abdomen: Soft, non-tender, non-distended with normal bowel sounds. Musculoskeletal: No overt significant bilateral edema  Skin:  Warm and dry  Neurologic:  Legally blind otherwise Neurovascularly intact without any focal sensory/motor deficits. Cranial nerves: II-XII intact, grossly non-focal.  Psychiatric: Alert and oriented,  limited insight       Labs:   Recent Labs     10/06/21  0847 10/07/21  0745   WBC 4.7 5.3   HGB 8.8* 8.6*   HCT 25.3* 25.0*   * 103*     Recent Labs     10/05/21  1059 10/06/21  0847 10/07/21  0745    141 138   K 3.9 4.2 4.2   * 112* 108   CO2 15* 19* 19*   BUN 32* 35* 38*   CREATININE 3.7* 3.8* 4.1*   CALCIUM 8.0* 8.0* 8.2*     No results for input(s): AST, ALT, BILIDIR, BILITOT, ALKPHOS in the last 72 hours. No results for input(s): INR in the last 72 hours. No results for input(s): Laksh[place] in the last 72 hours. Urinalysis:      Lab Results   Component Value Date    NITRU Negative 09/30/2021    WBCUA 21-50 09/30/2021    BACTERIA 3+ 09/30/2021    RBCUA 5-10 09/30/2021    BLOODU SMALL 09/30/2021    SPECGRAV 1.015 09/30/2021    GLUCOSEU Negative 09/30/2021       Radiology:  NM Cardiac Stress Test Nuclear Imaging   Final Result      VL Extremity Venous Left   Final Result      XR CHEST PORTABLE   Final Result   1. Congestive heart failure. US RETROPERITONEAL COMPLETE   Final Result   Grossly unremarkable ultrasound of the kidneys and urinary bladder. No   hydronephrosis. CT HEAD WO CONTRAST   Final Result   No acute intracranial abnormality.       Small right mastoid effusion                 Assessment/Plan:    Active Hospital Problems    Diagnosis     Elevated troponin [R77.8]     Cardiomyopathy ([address] [I42.9]     Normocytic anemia [D64.9]     Acute renal failure superimposed on chronic kidney disease ([facility] [N17.9, N18.9]     Acute UTI [N39.0]     Acute encephalopathy [G93.40]     DM [facility]diabetes mellitus[facility] [E11.9]     Hypertension [I10]     Hyperlipidemia [E78.5]     CAD (coronary artery disease) [I25.10]      BRBPR with anemia: no acute GIB noted inpatient with no need for endoscopy at this time  per GI. Continue to follow clinically for any reoccurrence. hgb stable          Acute metab encephalopathy: likely due to UTI, worsening of underlying dementia. Neuro assisted MRI  Brain-unable to obtain d/t sternal wires. Continue supportive care, avoid sedatives    ADRIAN on CKD : likely prerenal , had improved with IVF. nephro assisting. Cr uptrending . nephro assisting. Started on IVF. May need to start dialysis if not improving. Monitor and avoid nephrotoxins      UTI with possible acute mastoditis:UC grew skin/urogenital rick. Completed treatment with IV ceftriaxone and IV Zyvox, d/obey on 10/4. DMII with hypoglycemia: Lantus held with no further hypoglycemia. Continue SSI . Monitor and adjust insulin doses as needed      Cardiomyopathy : Ef 15-20% on echo. continue beta-blocker, hydralazine and nitrate combination. No ACE/ARB/ARNI/aldosterone antagonist due to renal insufficiency. CAD with elevated trop : cardio assisting . Stress test abnormal-EF 32%, large scar with no ischemia. Medical management recommended per cardio. Continue BB, aspirin, statin        Anemia: likely anemia of chronic disease due to CKD. on retacrit weekly      Urinary retention: persistent despite getting flomax and getting intermittent straight cath. UTI has been treated. Urology recs appreciated. Continue Ryan    DVT Prophylaxis: scds  Diet: ADULT DIET; Regular; 4 carb choices (60 gm/meal);  Low Sodium (2 gm)  Code Status: Full Code    PT/OT Eval Status: consulted    Dispo - likely when renal function stabilizes to SNF     Yadi Lundberg MD

## 2021-10-07 NOTE — PROGRESS NOTES
Comprehensive Nutrition Assessment    Type and Reason for Visit:  RD Nutrition Re-Screen/LOS    Nutrition Recommendations/Plan:   1. Continue regular, carb control, low sodium diet   2. Start Glucerna once daily  3. Encourage po intakes  4. Monitor po intakes, nutrition adequacy, weights, pertinent labs, BMs    Nutrition Assessment:  LOS. Pt admitted 9/30 r/t AMS. Pt currently on regular, carb control diet, low sodium. Pt reported good appetite. 0-100% intakes, with improvements over the past couple days per EMR. ONS to be added once daily. Nephrology is following r/t impaired kidney function. No c/o N/V/D or abdominal pain reported this morning. Will continue to monitor. Malnutrition Assessment:  Malnutrition Status: At risk for malnutrition (Comment)    Context:  Acute Illness         Estimated Daily Nutrient Needs:  Energy (kcal):  9846-9007; Weight Used for Energy Requirements:  Current (62.3kg)     Protein (g):  62-75; Weight Used for Protein Requirements:  Current (1-1.2)         Method Used for Fluid Requirements:  1 ml/kcal      Nutrition Related Findings:  stg 2 to coccyx recorded on 10/6. BG 78-203mg/dL x24hr. BM x1 on 10/5. Trace edema, BLE. BUN 38. Creatinine 4.1. Wounds:          Current Nutrition Therapies:    ADULT DIET; Regular; 4 carb choices (60 gm/meal);  Low Sodium (2 gm)  Adult Oral Nutrition Supplement; Diabetic Oral Supplement    Anthropometric Measures:  · Height: 5' 8\" (172.7 cm)  · Current Body Weight: 137 lb 9.1 oz (62.4 kg)     · Ideal Body Weight: 154 lbs;   · BMI: 20.9  · BMI Categories: Underweight (BMI less than 22) age over 72       Nutrition Diagnosis:   · Inadequate energy intake related to cognitive or neurological impairment as evidenced by intake 51-75%, intake 26-50%, intake 0-25%      Nutrition Interventions:   Food and/or Nutrient Delivery:  Continue Current Diet  Nutrition Education/Counseling:  Education not indicated   Coordination of Nutrition Care:  Continue to monitor while inpatient    Goals:  Pt will consume 50% or more of meals during this admission       Nutrition Monitoring and Evaluation:   Behavioral-Environmental Outcomes:  None Identified   Food/Nutrient Intake Outcomes:  Food and Nutrient Intake  Physical Signs/Symptoms Outcomes:  Biochemical Data, Weight, Skin, Nutrition Focused Physical Findings     Discharge Planning:    Continue current diet     Electronically signed by Shayla Stoddard on 10/7/21 at 11:17 AM EDT    Contact: [phone]

## 2021-10-07 NOTE — PROGRESS NOTES
Physical Therapy  Facility/Department: [facility] B3 - MED SURG  Daily Treatment Note  NAME: Nette Decker  : 1945  MRN: [id number]    Date of Service: 10/7/2021    Discharge Recommendations:  Subacute/Skilled Nursing Facility   PT Equipment Recommendations  Equipment Needed: No    Assessment   Body structures, Functions, Activity limitations: Decreased functional mobility ; Decreased safe awareness;Decreased balance;Decreased endurance;Decreased strength;Decreased cognition;Decreased posture  Assessment: Pt performing functional mobility tasks with less assistance than in previous sessions (min-modA x 1 for most tasks today), although still confused and struggling with following multi-step commands. Pt had particular difficulty following commands and performing 180*/360* turns when approaching his chair to sit. Recommend SNF at D/C in light of current deficits. Treatment Diagnosis: weakness, decreased mobility, decreased cognition  Specific instructions for Next Treatment: Progress ther ex and mobility as tolerated  Prognosis: Good  Decision Making: Medium Complexity  PT Education: Goals;PT Role;Plan of Care;General Safety; Disease Specific Education;Gait Training;Transfer Training;Orientation; Functional Mobility Training;Equipment;Precautions  Patient Education: Disease-Specific Education: pt educated in general safety and safe technique when ambulating with RW; pt demonstrates some understanding but will require lots of reinforcement 2* impaired cognition. Barriers to Learning: Confusion  REQUIRES PT FOLLOW UP: Yes  Activity Tolerance: Patient Tolerated treatment well;Patient limited by cognitive status     Patient Diagnosis(es): The primary encounter diagnosis was Acute renal failure superimposed on chronic kidney disease, unspecified CKD stage, unspecified acute renal failure type ([facility] [address]  Diagnoses of Urinary tract infection without hematuria, site unspecified and Altered mental status, unspecified altered mental status type were also pertinent to this visit. 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 has a past surgical history that includes Cardiac surgery (2006); Lung surgery (2006); Kidney stone surgery (1996); Eye surgery (Bilateral, 2003); and Colonoscopy. Restrictions  Restrictions/Precautions  Restrictions/Precautions: Up as Tolerated, General Precautions, Fall Risk  Position Activity Restriction  Other position/activity restrictions: [employer] monitor in room, pt is legally blind     Subjective   General  Chart Reviewed: Yes  Response To Previous Treatment: Patient with no complaints from previous session. Family / Caregiver Present: No  Referring Practitioner: Henna Arteaga DO  Subjective  Subjective: Pt agreeable to work with PT this morning with min encouragement. Pt states he remembers falling last night. \"I got up to use the bathroom and before I knew it, I was down on my knees. \"  General Comment  Comments: Pt resting in bed upon entry of PT, RN cleared pt for PT today  Pain Screening  Patient Currently in Pain: Denies     Vital Signs  Pulse: 83  Heart Rate Source: Monitor  BP: (!) 117/58  BP Location: Left upper arm  Patient Position: Semi fowlers  Patient Currently in Pain: Denies  Oxygen Therapy  SpO2: 96 %  Pulse Oximeter Device Mode: Intermittent  Pulse Oximeter Device Location: Left;Finger  O2 Device: None (Room air)    Orientation  Orientation  Orientation Level: Oriented to person;Disoriented to time;Disoriented to place; Disoriented to situation     Objective   Bed mobility  Supine to Sit: Contact guard assistance (moving toward L side, HOB elevated ~45 degrees, increased time needed to complete)  Scooting: Stand by assistance (to scoot forward to EOB) Transfers  Sit to Stand: Moderate Assistance (from EOB to RW, mod cues needed for safe hand placement/technique)  Stand to sit: Minimal Assistance  Bed to Chair: Moderate assistance (using RW, initial part of transfer required Ivelisse x 1 although assist level increased to modA x 1 as transfer progressed)     Ambulation  Surface: level tile  Device: [address]: Minimal assistance; Moderate assistance (Ivelisse x 1 increasing to modA x 1 as amb progressed)  Quality of Gait: Pt amb with straight path at first, although path became more deviated as amb progressed. Pt with particular difficulty performing 180*/360* turns with max cues and modA x 1 needed to successfully complete task. Moderately unsteady with decreased stride length B.  LOB x 4 (usually posteriorly or toward L), corrected with modA from PT. Gait Deviations: Slow Brionna;Decreased step length;Decreased step height;Staggers; Deviated path  Distance: x 25 feet     Balance  Posture: Fair  Sitting - Static: Good;-  Sitting - Dynamic: Fair  Standing - Static: Fair;-  Standing - Dynamic: Poor;+    AM-PAC Score  AM-PAC Inpatient Mobility Raw Score : 13 (10/07/21 1055)  AM-PAC Inpatient T-Scale Score : 36.74 (10/07/21 1055)  Mobility Inpatient CMS 0-100% Score: 64.91 (10/07/21 1055)  Mobility Inpatient CMS G-Code Modifier : CL (10/07/21 1055)    Goals  Short term goals  Time Frame for Short term goals: 1 week (10/7) unless otherwise specified - goal time frame extended x 1 week to 10/14/21 secondary to longer-than-expected LOS  Short term goal 1: pt to perform bed mobility with supervision  Short term goal 2: pt to perform transfers with CGA x 1  Short term goal 3: pt to amb with least restrictive or no device x 50 feet with CGA x 1  Short term goal 4: pt to participate in LE Ex 8-10 reps by 10/5 - MET 10/06/21  Patient Goals   Patient goals : Pt is confused, does not state goals when asked    Plan    Times per week: 3-5x/week in acute care  Times per day: Daily  Specific instructions for Next Treatment: Progress ther ex and mobility as tolerated  Current Treatment Recommendations: Strengthening, Transfer Training, Endurance Training, Cognitive Reorientation, Balance Training, Gait Training, Functional Mobility Training, Safety Education & Training, Neuromuscular Re-education, Home Exercise Program, Patient/Caregiver Education & Training, Positioning  Safety Devices: All fall risk precautions in place, Gait belt, Patient at risk for falls, Nurse notified, Call light within reach, Left in chair, Telesitter in use, Chair alarm in place     Therapy Time   Individual Concurrent Group Co-treatment   Time In 0948         Time Out 1028         Minutes 40         Timed Code Treatment Minutes: [facility] [place], [place] #[id number]    If pt is unable to be seen after this session, please let this note serve as discharge summary. Please see case management note for discharge disposition. Thank you.

## 2021-10-07 NOTE — PLAN OF CARE
Problem: Nutrition  Goal: Optimal nutrition therapy  Outcome: Ongoing  Note: Nutrition Problem #1: Inadequate energy intake  Intervention: Food and/or Nutrient Delivery: Continue Current Diet  Nutritional Goals: Pt will consume 50% or more of meals during this admission

## 2021-10-08 LAB
ANION GAP SERPL CALCULATED.3IONS-SCNC: 13 MMOL/L (ref 3–16)
BASOPHILS ABSOLUTE: 0 K/UL (ref 0–0.2)
BASOPHILS RELATIVE PERCENT: 0.7 %
BUN BLDV-MCNC: 39 MG/DL (ref 7–20)
CALCIUM SERPL-MCNC: 8.3 MG/DL (ref 8.3–10.6)
CHLORIDE BLD-SCNC: 109 MMOL/L (ref 99–110)
CO2: 20 MMOL/L (ref 21–32)
CREAT SERPL-MCNC: 3.7 MG/DL (ref 0.8–1.3)
EOSINOPHILS ABSOLUTE: 0.2 K/UL (ref 0–0.6)
EOSINOPHILS RELATIVE PERCENT: 3.8 %
GFR AFRICAN AMERICAN: 20
GFR NON-AFRICAN AMERICAN: 16
GLUCOSE BLD-MCNC: 142 MG/DL (ref 70–99)
GLUCOSE BLD-MCNC: 149 MG/DL (ref 70–99)
GLUCOSE BLD-MCNC: 156 MG/DL (ref 70–99)
GLUCOSE BLD-MCNC: 171 MG/DL (ref 70–99)
GLUCOSE BLD-MCNC: 186 MG/DL (ref 70–99)
HCT VFR BLD CALC: 25.4 % (ref 40.5–52.5)
HEMOGLOBIN: 8.5 G/DL (ref 13.5–17.5)
HEPATITIS B CORE TOTAL ANTIBODY: NEGATIVE
LYMPHOCYTES ABSOLUTE: 0.6 K/UL (ref 1–5.1)
LYMPHOCYTES RELATIVE PERCENT: 15.5 %
MCH RBC QN AUTO: 32.3 PG (ref 26–34)
MCHC RBC AUTO-ENTMCNC: 33.5 G/DL (ref 31–36)
MCV RBC AUTO: 96.4 FL (ref 80–100)
MONOCYTES ABSOLUTE: 0.4 K/UL (ref 0–1.3)
MONOCYTES RELATIVE PERCENT: 8.9 %
NEUTROPHILS ABSOLUTE: 2.9 K/UL (ref 1.7–7.7)
NEUTROPHILS RELATIVE PERCENT: 71.1 %
PDW BLD-RTO: 15.4 % (ref 12.4–15.4)
PERFORMED ON: ABNORMAL
PLATELET # BLD: 89 K/UL (ref 135–450)
PLATELET SLIDE REVIEW: ABNORMAL
PMV BLD AUTO: 9.6 FL (ref 5–10.5)
POTASSIUM SERPL-SCNC: 4.6 MMOL/L (ref 3.5–5.1)
RBC # BLD: 2.63 M/UL (ref 4.2–5.9)
SLIDE REVIEW: ABNORMAL
SODIUM BLD-SCNC: 142 MMOL/L (ref 136–145)
WBC # BLD: 4.1 K/UL (ref 4–11)

## 2021-10-08 PROCEDURE — 6370000000 HC RX 637 (ALT 250 FOR IP): Performed by: INTERNAL MEDICINE

## 2021-10-08 PROCEDURE — 36415 COLL VENOUS BLD VENIPUNCTURE: CPT

## 2021-10-08 PROCEDURE — 97530 THERAPEUTIC ACTIVITIES: CPT

## 2021-10-08 PROCEDURE — 97110 THERAPEUTIC EXERCISES: CPT

## 2021-10-08 PROCEDURE — 6370000000 HC RX 637 (ALT 250 FOR IP): Performed by: NURSE PRACTITIONER

## 2021-10-08 PROCEDURE — 2580000003 HC RX 258: Performed by: INTERNAL MEDICINE

## 2021-10-08 PROCEDURE — 80048 BASIC METABOLIC PNL TOTAL CA: CPT

## 2021-10-08 PROCEDURE — 1200000000 HC SEMI PRIVATE

## 2021-10-08 PROCEDURE — 85025 COMPLETE CBC W/AUTO DIFF WBC: CPT

## 2021-10-08 PROCEDURE — 6370000000 HC RX 637 (ALT 250 FOR IP): Performed by: HOSPITALIST

## 2021-10-08 PROCEDURE — 2580000003 HC RX 258: Performed by: HOSPITALIST

## 2021-10-08 RX ORDER — SODIUM CHLORIDE, SODIUM GLUCONATE, SODIUM ACETATE, POTASSIUM CHLORIDE AND MAGNESIUM CHLORIDE 526; 502; 368; 37; 30 MG/100ML; MG/100ML; MG/100ML; MG/100ML; MG/100ML
INJECTION, SOLUTION INTRAVENOUS CONTINUOUS
Status: DISCONTINUED | OUTPATIENT
Start: 2021-10-08 | End: 2021-10-09

## 2021-10-08 RX ADMIN — SODIUM CHLORIDE, SODIUM GLUCONATE, SODIUM ACETATE, POTASSIUM CHLORIDE AND MAGNESIUM CHLORIDE: 526; 502; 368; 37; 30 INJECTION, SOLUTION INTRAVENOUS at 10:07

## 2021-10-08 RX ADMIN — SODIUM BICARBONATE 650 MG TABLET 1300 MG: at 09:03

## 2021-10-08 RX ADMIN — ATORVASTATIN CALCIUM 20 MG: 10 TABLET, FILM COATED ORAL at 09:03

## 2021-10-08 RX ADMIN — SODIUM CHLORIDE, PRESERVATIVE FREE 10 ML: 5 INJECTION INTRAVENOUS at 20:56

## 2021-10-08 RX ADMIN — SODIUM BICARBONATE 650 MG TABLET 1300 MG: at 20:56

## 2021-10-08 RX ADMIN — CARVEDILOL 18.75 MG: 6.25 TABLET, FILM COATED ORAL at 09:03

## 2021-10-08 RX ADMIN — ISOSORBIDE DINITRATE 10 MG: 10 TABLET ORAL at 20:56

## 2021-10-08 RX ADMIN — HYDRALAZINE HYDROCHLORIDE 10 MG: 10 TABLET, FILM COATED ORAL at 23:44

## 2021-10-08 RX ADMIN — HYDRALAZINE HYDROCHLORIDE 10 MG: 10 TABLET, FILM COATED ORAL at 06:38

## 2021-10-08 RX ADMIN — CARVEDILOL 18.75 MG: 6.25 TABLET, FILM COATED ORAL at 16:42

## 2021-10-08 RX ADMIN — INSULIN LISPRO 1 UNITS: 100 INJECTION, SOLUTION INTRAVENOUS; SUBCUTANEOUS at 18:17

## 2021-10-08 RX ADMIN — SODIUM BICARBONATE 650 MG TABLET 1300 MG: at 14:06

## 2021-10-08 RX ADMIN — ISOSORBIDE DINITRATE 10 MG: 10 TABLET ORAL at 09:03

## 2021-10-08 RX ADMIN — ISOSORBIDE DINITRATE 10 MG: 10 TABLET ORAL at 14:06

## 2021-10-08 RX ADMIN — ASPIRIN 81 MG: 81 TABLET, COATED ORAL at 09:03

## 2021-10-08 RX ADMIN — INSULIN LISPRO 1 UNITS: 100 INJECTION, SOLUTION INTRAVENOUS; SUBCUTANEOUS at 12:53

## 2021-10-08 RX ADMIN — PANTOPRAZOLE SODIUM 40 MG: 40 TABLET, DELAYED RELEASE ORAL at 06:38

## 2021-10-08 RX ADMIN — INSULIN LISPRO 1 UNITS: 100 INJECTION, SOLUTION INTRAVENOUS; SUBCUTANEOUS at 09:04

## 2021-10-08 RX ADMIN — Medication 2000 UNITS: at 09:04

## 2021-10-08 RX ADMIN — TAMSULOSIN HYDROCHLORIDE 0.4 MG: 0.4 CAPSULE ORAL at 09:03

## 2021-10-08 RX ADMIN — SODIUM CHLORIDE, SODIUM GLUCONATE, SODIUM ACETATE, POTASSIUM CHLORIDE AND MAGNESIUM CHLORIDE: 526; 502; 368; 37; 30 INJECTION, SOLUTION INTRAVENOUS at 20:56

## 2021-10-08 RX ADMIN — HYDRALAZINE HYDROCHLORIDE 10 MG: 10 TABLET, FILM COATED ORAL at 14:06

## 2021-10-08 ASSESSMENT — PAIN SCALES - GENERAL
PAINLEVEL_OUTOF10: 8
PAINLEVEL_OUTOF10: 0

## 2021-10-08 ASSESSMENT — PAIN DESCRIPTION - LOCATION: LOCATION: ABDOMEN

## 2021-10-08 NOTE — PROGRESS NOTES
[facility]    [facility]              [phone]                         Interval History and plan:      Creatinine is slightly better  He is alert and oriented  But appears to be forgetful      Plan:     He appears to have very advanced CKD  His estimated GFR is 14 mL however if he has a lot of muscle mass loss that tends to be inaccurate  May need to start dialysis    Continue with current fluid but will stop tonight so that he does not have edema or shortness of breath given history of congestive heart failure  We will need to decide about fluids tomorrow after seeing the patient tomorrow  Urine output is not much  If he does not  he might need to be started on dialysis on Monday                     Assessment :     Acute Kidney Injury on CKD 4  ADRIAN likely due to -prerenal etiology  Cr on consultation 3.3  Baseline Cr- 3  CKD likely due to diabetes hypertension recurrent ADRIAN  He was admitted to [facility] on 6/29- 7/8/21- with ADRIAN  Cr at DC was 3.38 from peak of 4.75(pre-renal)  UA- small blood, small nitrite, wbc 21-50  Renal Imaging:-10/21-right kidney 8.2 cm, left 8.3 cm, no hydronephrosis  Echo: 9/21-EF 15 to 72%, grade 2 diastolic dysfunction  6/02- nromal EF, RV pressure < 35 mmHg      Hypertension   BP: (123)/(71)  Pulse:  [86]   BP goal inpatient [id number] systolic inpatient    Anemia of CKD  Iron sat normal  Hb 9.5 on consult    Acidosis  Oral bicarb ok    BPH  History of renal stones  CAD  Carotid artery occlusion  Diabetes mellitus        [facility] would like to thank Kae Martinez MD   for opportunity to serve this patient      Please call with questions at-   24 Hrs Answering service [phone] or  7 am- 5 pm via Perfect serve or cell phone  Erlinda Forrester MD          CC/reason for consult :     ADRIAN     HPI :     Lexie Gage is a 78 y.o. male presented to   the hospital on 9/30/2021 with altered mental status from nursing facility. He is unable to provide details. Per reports he has had bright red blood per rectum. He was brought to the emergency room where work-up showed possible inferolateral ischemia on the heart and had QTc prolongation. CT scan of the head was done which was negative. He is scheduled to do MRI but has not done yet. He was found to have metabolic acidosis, ADRIAN. Also elevation of troponin. He had a UA done which showed possible infection, culture is pending also blood culture is sent and results are pending. He is currently on IV antibiotics. We are consulted for ADRIAN and related issues    ROS:     Positives Listed Bold. All other remaining systems are negative. Constitutional:  fever, chills, weakness, weight change, fatigue,      Skin:  rash, pruritus, hair loss, bruising, dry skin, petechiae. Head, Face, Neck   headaches, swelling,  cervical adenopathy. Respiratory: shortness of breath, cough, or wheezing  Cardiovascular: chest pain, palpitations, dizzy, edema  Gastrointestinal: nausea, vomiting, diarrhea, constipation,belly pain    Yellow skin, blood in stool  Musculoskeletal:  back pain, muscle weakness, gait problems,       joint pain or swelling. Genitourinary:  dysuria, poor urine flow, flank pain, blood in urine  Neurologic:  vertigo, TIA'S, syncope, seizures, focal weakness  Psychosocial:  insomnia, anxiety, or depression.   Additional positive findings: -         PMH/PSH/SH/Family History:     Past Medical History:   Diagnosis Date    CAD (coronary artery disease)     Chronic renal disease     Dr. 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 He has never used smokeless tobacco. He reports that he does not drink alcohol and does not use drugs. family history includes Diabetes in his mother.          Medication:     Current Facility-Administered Medications: electrolyte-A (PLASMALYTE-A) solution, , IntraVENous, Continuous  [START ON 10/11/2021] epoetin long-epbx (RETACRIT) injection 1,000 Units, 1,000 Units, SubCUTAneous, Weekly  carvedilol (COREG) tablet 18.75 mg, 18.75 mg, Oral, BID WC  sodium bicarbonate tablet 1,300 mg, 1,300 mg, Oral, TID  hydrALAZINE (APRESOLINE) tablet 10 mg, 10 mg, Oral, 3 times per day  insulin lispro (HUMALOG) injection vial 0-6 Units, 0-6 Units, SubCUTAneous, TID WC  insulin lispro (HUMALOG) injection vial 0-3 Units, 0-3 Units, SubCUTAneous, Nightly  tamsulosin (FLOMAX) capsule 0.4 mg, 0.4 mg, Oral, Daily  isosorbide dinitrate (ISORDIL) tablet 10 mg, 10 mg, Oral, TID  heparin (porcine) injection 5,000 Units, 5,000 Units, SubCUTAneous, 3 times per day  pantoprazole (PROTONIX) tablet 40 mg, 40 mg, Oral, QAM AC  [Held by provider] insulin glargine (LANTUS) injection vial 14 Units, 14 Units, SubCUTAneous, Nightly  atorvastatin (LIPITOR) tablet 20 mg, 20 mg, Oral, Daily  aspirin EC tablet 81 mg, 81 mg, Oral, Daily  sodium chloride flush 0.9 % injection 10 mL, 10 mL, IntraVENous, 2 times per day  sodium chloride flush 0.9 % injection 10 mL, 10 mL, IntraVENous, PRN  0.9 % sodium chloride infusion, 25 mL, IntraVENous, PRN  magnesium sulfate 2000 mg in 50 mL IVPB premix, 2,000 mg, IntraVENous, PRN  promethazine (PHENERGAN) tablet 12.5 mg, 12.5 mg, Oral, Q6H PRN **OR** ondansetron (ZOFRAN) injection 4 mg, 4 mg, IntraVENous, Q6H PRN  senna (SENOKOT) tablet 8.6 mg, 1 tablet, Oral, Daily PRN  perflutren lipid microspheres (DEFINITY) injection 1.65 mg, 1.5 mL, IntraVENous, ONCE PRN  glucose (GLUTOSE) 40 % oral gel 15 g, 15 g, Oral, PRN  dextrose 50 % IV solution, 12.5 g, IntraVENous, PRN  glucagon (rDNA) injection 1 mg, 1 mg, IntraMUSCular, PRN  dextrose 5 % solution, 100 mL/hr, IntraVENous, PRN  Vitamin D (CHOLECALCIFEROL) tablet 2,000 Units, 2,000 Units, Oral, Daily       Vitals :     Vitals:    10/08/21 1145   BP: 123/71   Pulse: 86   Resp: 16   Temp: 97.5 °F (36.4 °C)   SpO2: 100%       I & O :       Intake/Output Summary (Last 24 hours) at 10/8/2021 1601  Last data filed at 10/8/2021 1553  Gross per 24 hour   Intake 270 ml   Output 475 ml   Net -205 ml        Physical Examination :     General appearance: male in NAD, alert and awake. HEENT: EOM intact, no icterus. Trachea is midline. Neck : No mass, appears symmetrical, no JVD   Respiratory: Respiratory effort appears normal, no wheeze, no crackles  Cardiovascular: Ausculation- No M/R/G, no edema  Abdomen: No visible mass or tenderness  Musculoskeletal:  No clubbing,cyanosis, joints with no swelling or deformity. Skin:no rashes, ulcers, induration, no jaundice. Neuro: face symmetric, no focal deficits. Appropriate responses.  CN 2-12 grossly intact    Additional findings:-        LABS:     Recent Labs     10/06/21  0847 10/07/21  0745 10/08/21  0640   WBC 4.7 5.3 4.1   HGB 8.8* 8.6* 8.5*   HCT 25.3* 25.0* 25.4*   * 103* 89*     Recent Labs     10/06/21  0847 10/07/21  0745 10/08/21  0640    138 142   K 4.2 4.2 4.6   * 108 109   CO2 19* 19* 20*   BUN 35* 38* 39*   CREATININE 3.8* 4.1* 3.7*   GLUCOSE 169* 77 149*

## 2021-10-08 NOTE — CARE COORDINATION
Chart review- Plan to return to [address] at [email or web address] [place]. JAMAR cancelled early in week due to increased Creatinine. Nephrology following. Plasmalyte for low BP. Will continue to follow.

## 2021-10-08 NOTE — PROGRESS NOTES
Hospitalist Progress Note      PCP: Vinod Medrano    Date of Admission: 9/30/2021    Chief Complaint: [address] Course: H& P reviewed     Subjective: Ryan placed. Patient seen sitting comfortably in chair and denies any complaints. Cr improved today       Medications:  Reviewed    Infusion Medications    electrolyte-A      sodium chloride Stopped (10/02/21 0344)    dextrose       Scheduled Medications    [START ON 10/11/2021] epoetin long-epbx  1,000 Units SubCUTAneous Weekly    carvedilol  18.75 mg Oral BID WC    sodium bicarbonate  1,300 mg Oral TID    hydrALAZINE  10 mg Oral 3 times per day    insulin lispro  0-6 Units SubCUTAneous TID WC    insulin lispro  0-3 Units SubCUTAneous Nightly    tamsulosin  0.4 mg Oral Daily    isosorbide dinitrate  10 mg Oral TID    heparin (porcine)  5,000 Units SubCUTAneous 3 times per day    pantoprazole  40 mg Oral QAM AC    [Held by provider] insulin glargine  14 Units SubCUTAneous Nightly    atorvastatin  20 mg Oral Daily    aspirin  81 mg Oral Daily    sodium chloride flush  10 mL IntraVENous 2 times per day    Vitamin D  2,000 Units Oral Daily     PRN Meds: sodium chloride flush, sodium chloride, magnesium sulfate, promethazine **OR** ondansetron, senna, perflutren lipid microspheres, glucose, dextrose, glucagon (rDNA), dextrose      Intake/Output Summary (Last 24 hours) at 10/8/2021 1720  Last data filed at 10/8/2021 1649  Gross per 24 hour   Intake 270 ml   Output 375 ml   Net -105 ml       Physical Exam Performed:    /61   Pulse 85   Temp 97.8 °F (36.6 °C) (Oral)   Resp 16   Ht 5' 8\" (1.727 m)   Wt 137 lb 9.1 oz (62.4 kg)   SpO2 94%   BMI 20.92 kg/m²      General appearance: No apparent distress, appears stated age and cooperative. Legally blind  HEENT: Pupils equal, round, Conjunctivae/corneas clear. Neck: Supple, with full range of motion. No jugular venous distention. Trachea midline.   Respiratory:  Normal respiratory effort. Clear to auscultation, bilaterally without Rales/Wheezes/Rhonchi. Cardiovascular: Regular rate and rhythm with normal S1/S2 without murmurs, rubs or gallops. Abdomen: Soft, non-tender, non-distended with normal bowel sounds. Musculoskeletal: No overt significant bilateral edema  Skin:  Warm and dry  Neurologic:  Legally blind otherwise Neurovascularly intact without any focal sensory/motor deficits. Cranial nerves: II-XII intact, grossly non-focal.  Psychiatric: Alert and oriented,  limited insight       Labs:   Recent Labs     10/06/21  0847 10/07/21  0745 10/08/21  0640   WBC 4.7 5.3 4.1   HGB 8.8* 8.6* 8.5*   HCT 25.3* 25.0* 25.4*   * 103* 89*     Recent Labs     10/06/21  0847 10/07/21  0745 10/08/21  0640    138 142   K 4.2 4.2 4.6   * 108 109   CO2 19* 19* 20*   BUN 35* 38* 39*   CREATININE 3.8* 4.1* 3.7*   CALCIUM 8.0* 8.2* 8.3     No results for input(s): AST, ALT, BILIDIR, BILITOT, ALKPHOS in the last 72 hours. Recent Labs     10/07/21  1242   INR 1.08     Recent Labs     10/07/21  0745   CKTOTAL 64       Urinalysis:      Lab Results   Component Value Date    NITRU Negative 09/30/2021    WBCUA 21-50 09/30/2021    BACTERIA 3+ 09/30/2021    RBCUA 5-10 09/30/2021    BLOODU SMALL 09/30/2021    SPECGRAV 1.015 09/30/2021    GLUCOSEU Negative 09/30/2021       Radiology:  NM Cardiac Stress Test Nuclear Imaging   Final Result      VL Extremity Venous Left   Final Result      XR CHEST PORTABLE   Final Result   1. Congestive heart failure. US RETROPERITONEAL COMPLETE   Final Result   Grossly unremarkable ultrasound of the kidneys and urinary bladder. No   hydronephrosis. CT HEAD WO CONTRAST   Final Result   No acute intracranial abnormality.       Small right mastoid effusion                 Assessment/Plan:    Active Hospital Problems    Diagnosis     Elevated troponin [R77.8]     Cardiomyopathy ([address] [I42.9]     Normocytic anemia [D64.9]     [facility] [N17.9, N18.9]     Acute UTI [N39.0]     Acute encephalopathy [G93.40]     DM (diabetes mellitus), type 2 ([facility] [E11.9]     Hypertension [I10]     Hyperlipidemia [E78.5]     CAD (coronary artery disease) [I25.10]      BRBPR with anemia: no acute GIB noted inpatient with no need for endoscopy at this time  per GI. Continue to follow clinically for any reoccurrence. hgb stable    Acute metab encephalopathy: likely due to UTI, worsening of underlying dementia. Neuro assisted MRI  Brain-unable to obtain d/t sternal wires. Continue supportive care, avoid sedatives    ADRIAN on CKD : likely prerenal , had improved with IVF. nephro assisting. Cr uptrending . nephro assisting. Started on IVF. May need to start dialysis if not improving. Monitor and avoid nephrotoxins    UTI with possible acute mastoditis:UC grew skin/urogenital rick. Completed treatment with IV ceftriaxone and IV Zyvox, d/obey on 10/4. DMII with hypoglycemia: Lantus held with no further hypoglycemia. Continue SSI . Monitor and adjust insulin doses as needed    Cardiomyopathy : Ef 15-20% on echo. continue beta-blocker, hydralazine and nitrate combination. No ACE/ARB/ARNI/aldosterone antagonist due to renal insufficiency. CAD with elevated trop : cardio assisting . Stress test abnormal-EF 32%, large scar with no ischemia. Medical management recommended per cardio. Continue BB, aspirin, statin    Anemia: likely anemia of chronic disease due to CKD. on retacrit weekly      Urinary retention: persistent despite getting flomax and getting intermittent straight cath. UTI has been treated. Urology recs appreciated. Continue Ryan    DVT Prophylaxis: scds  Diet: ADULT DIET; Regular; 4 carb choices (60 gm/meal);  Low Sodium (2 gm)  Adult Oral Nutrition Supplement; Diabetic Oral Supplement  Code Status: Full Code    PT/OT Eval Status: consulted    Dispo - likely when renal function stabilizes to SNF     Jacque Bernheim, APRN - CNP

## 2021-10-08 NOTE — PROGRESS NOTES
Occupational Therapy  Facility/Department: [facility] B3 - MED SURG  Daily Treatment Note  NAME: Ke Lugo  : 1945  MRN: [id number]    Date of Service: 10/8/2021    Discharge Recommendations:  Subacute/Skilled Nursing Facility       Assessment   Performance deficits / Impairments: Decreased functional mobility ; Decreased ADL status; Decreased cognition;Decreased safe awareness;Decreased strength;Decreased endurance;Decreased balance;Decreased high-level IADLs;Decreased fine motor control;Decreased vision/visual deficit; Decreased coordination  Assessment: Pt tolerated OT session well, pleasant and cooperative throughout. Due to visual deficits and unfamiliar environment pt requries verbal and tactile cueing with increased time for all activities. Pt min A for transfers and standing balance with RW, demos progress from previous OT treatment. Pt functioning below his baseline and would benefit from continued skilled OT in SNF setting at d/c. Prognosis: Good;Fair  OT Education: OT Role;Plan of Care;Transfer Training;Orientation;Home Exercise Program  Disease Specific Education: Pt educated on importance of OOB mobility, prevention of complications of bedrest, and general safety during hospitalization. Pt verbalized understanding, but would benefit from reinforcement. Barriers to Learning: cognition  REQUIRES OT FOLLOW UP: Yes  Activity Tolerance  Activity Tolerance: Patient Tolerated treatment well  Activity Tolerance: Vitals: BP= 116/67, HR= 85, SPO2= 99%  Safety Devices  Safety Devices in place: Yes  Type of devices: Left in chair;Chair alarm in place;Call light within reach;Nurse notified;Gait belt         Patient Diagnosis(es): The primary encounter diagnosis was Acute renal failure superimposed on chronic kidney disease, unspecified CKD stage, unspecified acute renal failure type ([facility] [address]  Diagnoses of Urinary tract infection without hematuria, site unspecified and Altered mental status, unspecified altered mental status type were also pertinent to this visit. 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 has a past surgical history that includes Cardiac surgery (2006); Lung surgery (2006); Kidney stone surgery (1996); Eye surgery (Bilateral, 2003); and Colonoscopy. Restrictions  Restrictions/Precautions  Restrictions/Precautions: Up as Tolerated, General Precautions, Fall Risk  Position Activity Restriction  Other position/activity restrictions: [employer] monitor in room, pt is legally blind, CHEL roth     Subjective   General  Chart Reviewed: Yes  Patient assessed for rehabilitation services?: Yes  Response to previous treatment: Patient with no complaints from previous session  Family / Caregiver Present: No    Subjective  Subjective: Pt resting in bed, pleasant and agreeable to OT treatment. Vital Signs  Patient Currently in Pain: Denies     Orientation  Orientation  Orientation Level: Oriented to person;Oriented to place;Oriented to situation;Disoriented to time     Objective    ADL  Toileting: Dependent/Total (roth)     Balance  Sitting Balance: Minimal assistance  Standing Balance: Minimal assistance (RW)  Standing Balance  Activity: bed to chair with RW    Bed mobility  Supine to Sit: Moderate assistance (to L with HOB elevated)     Transfers  Sit to stand: Minimal assistance  Stand to sit: Minimal assistance     Cognition  Overall Cognitive Status: Exceptions  Following Commands: Follows one step commands with repetition; Follows one step commands with increased time  Attention Span: Attends with cues to redirect  Safety Judgement: Decreased awareness of need for safety  Insights: Decreased awareness of deficits  Sequencing: Requires cues for some     Type of ROM/Therapeutic Exercise  Type of ROM/Therapeutic Exercise: AROM  Comment: seated in chair, tactile and verbal cues due to visual deficits  Exercises  Shoulder Flexion: 15x  Horizontal ABduction: 15x  Horizontal ADduction: 15x  Elbow Flexion: 15x  Elbow Extension: 15x  Supination: 15x  Pronation: 15x  Wrist Flexion: 15x  Wrist Extension: 15x  Finger Flexion: 15x  Finger Extension: 15x        Plan   Plan  Times per week: 3-5x  Specific instructions for Next Treatment: cotx  Current Treatment Recommendations: Balance Training, Functional Mobility Training, Safety Education & Training, Cognitive Reorientation, Self-Care / ADL, Patient/Caregiver Education & Training, Home Management Training, Endurance Training    AM-PAC Score  AM-PAC Inpatient Daily Activity Raw Score: 12 (10/08/21 1311)  AM-PAC Inpatient ADL T-Scale Score : 30.6 (10/08/21 1311)  ADL Inpatient CMS 0-100% Score: 66.57 (10/08/21 1311)  ADL Inpatient CMS G-Code Modifier : CL (10/08/21 1311)    Goals  Short term goals  Time Frame for Short term goals: 1 week by 10/8  Short term goal 1: Pt will complete toileting with Mod A by 10/4-- GOAL NOT MET, dependent 10/6/21  Short term goal 2: Pt will complete LBD with Mod  A-- GOAL NOT MET, NT 10/8/21  Short term goal 3: Pt will complete grooming seated EOB with Min A-- GOAL NOT MET, NT 10/8/21  Short term goal 4: Pt will complete UBD seated EOB with Min A-- GOAL MOT MET, NT 10/8/21  Long term goals  Time Frame for Long term goals : STGs=LTGs  Patient Goals   Patient goals : unable to state       Therapy Time   Individual Concurrent Group Co-treatment   Time In 1140         Time Out 1218         Minutes [address] [facility], GONZALEZ/L

## 2021-10-08 NOTE — PLAN OF CARE
Problem: Pain:  Goal: Control of acute pain  Description: Control of acute pain  10/8/2021 1836 by [facility] - OLE Wear  Outcome: Ongoing  Note: Pt will be satisfied with pain control. Pt uses numeric pain rating scale with reassessments after pain med administration. Will continue to monitor progression throughout shift. Problem: Falls - Risk of:  Goal: Will remain free from falls  Description: Will remain free from falls  Outcome: Ongoing  Note: Pt high fall risk. Instructed to use call light before getting out of bed and when in need of assistance. Call light within reach. Bed in low position. Bed alarm and nonskid footwear on. Will continue to monitor. Problem: Metabolic:  Goal: Ability to maintain appropriate glucose levels will improve  Description: Ability to maintain appropriate glucose levels will improve  Outcome: Ongoing  Note: Pt will have accuchecks before meals and at bedtime with sliding scale insulin in place for coverage. Will continue to monitor for signs and symptoms of hypoglycemia and hyperglycemia throughout shift.

## 2021-10-09 LAB
ANION GAP SERPL CALCULATED.3IONS-SCNC: 14 MMOL/L (ref 3–16)
BASOPHILS ABSOLUTE: 0 K/UL (ref 0–0.2)
BASOPHILS RELATIVE PERCENT: 0.7 %
BUN BLDV-MCNC: 38 MG/DL (ref 7–20)
CALCIUM SERPL-MCNC: 8.5 MG/DL (ref 8.3–10.6)
CHLORIDE BLD-SCNC: 108 MMOL/L (ref 99–110)
CO2: 19 MMOL/L (ref 21–32)
CREAT SERPL-MCNC: 3.4 MG/DL (ref 0.8–1.3)
EOSINOPHILS ABSOLUTE: 0.1 K/UL (ref 0–0.6)
EOSINOPHILS RELATIVE PERCENT: 3.7 %
GFR AFRICAN AMERICAN: 22
GFR NON-AFRICAN AMERICAN: 18
GLUCOSE BLD-MCNC: 140 MG/DL (ref 70–99)
GLUCOSE BLD-MCNC: 163 MG/DL (ref 70–99)
GLUCOSE BLD-MCNC: 184 MG/DL (ref 70–99)
GLUCOSE BLD-MCNC: 191 MG/DL (ref 70–99)
GLUCOSE BLD-MCNC: 196 MG/DL (ref 70–99)
HCT VFR BLD CALC: 24.9 % (ref 40.5–52.5)
HEMOGLOBIN: 8.4 G/DL (ref 13.5–17.5)
LYMPHOCYTES ABSOLUTE: 0.5 K/UL (ref 1–5.1)
LYMPHOCYTES RELATIVE PERCENT: 15.6 %
MCH RBC QN AUTO: 32.6 PG (ref 26–34)
MCHC RBC AUTO-ENTMCNC: 33.7 G/DL (ref 31–36)
MCV RBC AUTO: 96.7 FL (ref 80–100)
MONOCYTES ABSOLUTE: 0.3 K/UL (ref 0–1.3)
MONOCYTES RELATIVE PERCENT: 9.6 %
NEUTROPHILS ABSOLUTE: 2.4 K/UL (ref 1.7–7.7)
NEUTROPHILS RELATIVE PERCENT: 70.4 %
PDW BLD-RTO: 15.7 % (ref 12.4–15.4)
PERFORMED ON: ABNORMAL
PLATELET # BLD: 69 K/UL (ref 135–450)
PLATELET SLIDE REVIEW: ABNORMAL
PMV BLD AUTO: 8.3 FL (ref 5–10.5)
POTASSIUM SERPL-SCNC: 4.6 MMOL/L (ref 3.5–5.1)
RBC # BLD: 2.57 M/UL (ref 4.2–5.9)
SLIDE REVIEW: ABNORMAL
SODIUM BLD-SCNC: 141 MMOL/L (ref 136–145)
WBC # BLD: 3.4 K/UL (ref 4–11)

## 2021-10-09 PROCEDURE — 6370000000 HC RX 637 (ALT 250 FOR IP): Performed by: NURSE PRACTITIONER

## 2021-10-09 PROCEDURE — 80048 BASIC METABOLIC PNL TOTAL CA: CPT

## 2021-10-09 PROCEDURE — 51702 INSERT TEMP BLADDER CATH: CPT

## 2021-10-09 PROCEDURE — 1200000000 HC SEMI PRIVATE

## 2021-10-09 PROCEDURE — 6360000002 HC RX W HCPCS: Performed by: HOSPITALIST

## 2021-10-09 PROCEDURE — 6370000000 HC RX 637 (ALT 250 FOR IP): Performed by: INTERNAL MEDICINE

## 2021-10-09 PROCEDURE — 36415 COLL VENOUS BLD VENIPUNCTURE: CPT

## 2021-10-09 PROCEDURE — 85025 COMPLETE CBC W/AUTO DIFF WBC: CPT

## 2021-10-09 PROCEDURE — 6370000000 HC RX 637 (ALT 250 FOR IP): Performed by: HOSPITALIST

## 2021-10-09 PROCEDURE — 2580000003 HC RX 258: Performed by: INTERNAL MEDICINE

## 2021-10-09 PROCEDURE — 2580000003 HC RX 258: Performed by: HOSPITALIST

## 2021-10-09 RX ADMIN — ATORVASTATIN CALCIUM 20 MG: 10 TABLET, FILM COATED ORAL at 08:19

## 2021-10-09 RX ADMIN — SODIUM CHLORIDE, SODIUM GLUCONATE, SODIUM ACETATE, POTASSIUM CHLORIDE AND MAGNESIUM CHLORIDE: 526; 502; 368; 37; 30 INJECTION, SOLUTION INTRAVENOUS at 18:17

## 2021-10-09 RX ADMIN — ISOSORBIDE DINITRATE 10 MG: 10 TABLET ORAL at 21:54

## 2021-10-09 RX ADMIN — SODIUM CHLORIDE, PRESERVATIVE FREE 10 ML: 5 INJECTION INTRAVENOUS at 23:41

## 2021-10-09 RX ADMIN — INSULIN LISPRO 1 UNITS: 100 INJECTION, SOLUTION INTRAVENOUS; SUBCUTANEOUS at 08:21

## 2021-10-09 RX ADMIN — HYDRALAZINE HYDROCHLORIDE 10 MG: 10 TABLET, FILM COATED ORAL at 21:54

## 2021-10-09 RX ADMIN — ASPIRIN 81 MG: 81 TABLET, COATED ORAL at 08:19

## 2021-10-09 RX ADMIN — INSULIN LISPRO 1 UNITS: 100 INJECTION, SOLUTION INTRAVENOUS; SUBCUTANEOUS at 21:55

## 2021-10-09 RX ADMIN — ISOSORBIDE DINITRATE 10 MG: 10 TABLET ORAL at 15:15

## 2021-10-09 RX ADMIN — SODIUM BICARBONATE 650 MG TABLET 1300 MG: at 08:19

## 2021-10-09 RX ADMIN — HYDRALAZINE HYDROCHLORIDE 10 MG: 10 TABLET, FILM COATED ORAL at 05:31

## 2021-10-09 RX ADMIN — SODIUM BICARBONATE 650 MG TABLET 1300 MG: at 21:54

## 2021-10-09 RX ADMIN — CARVEDILOL 18.75 MG: 6.25 TABLET, FILM COATED ORAL at 15:15

## 2021-10-09 RX ADMIN — Medication 2000 UNITS: at 08:19

## 2021-10-09 RX ADMIN — HYDRALAZINE HYDROCHLORIDE 10 MG: 10 TABLET, FILM COATED ORAL at 15:16

## 2021-10-09 RX ADMIN — PANTOPRAZOLE SODIUM 40 MG: 40 TABLET, DELAYED RELEASE ORAL at 05:31

## 2021-10-09 RX ADMIN — INSULIN LISPRO 1 UNITS: 100 INJECTION, SOLUTION INTRAVENOUS; SUBCUTANEOUS at 18:16

## 2021-10-09 RX ADMIN — CARVEDILOL 18.75 MG: 6.25 TABLET, FILM COATED ORAL at 08:19

## 2021-10-09 RX ADMIN — SODIUM BICARBONATE 650 MG TABLET 1300 MG: at 16:31

## 2021-10-09 RX ADMIN — INSULIN LISPRO 1 UNITS: 100 INJECTION, SOLUTION INTRAVENOUS; SUBCUTANEOUS at 13:27

## 2021-10-09 RX ADMIN — ONDANSETRON 4 MG: 2 INJECTION INTRAMUSCULAR; INTRAVENOUS at 10:15

## 2021-10-09 RX ADMIN — ISOSORBIDE DINITRATE 10 MG: 10 TABLET ORAL at 08:19

## 2021-10-09 RX ADMIN — TAMSULOSIN HYDROCHLORIDE 0.4 MG: 0.4 CAPSULE ORAL at 08:19

## 2021-10-09 ASSESSMENT — PAIN SCALES - GENERAL
PAINLEVEL_OUTOF10: 0
PAINLEVEL_OUTOF10: 0

## 2021-10-09 NOTE — PLAN OF CARE
Problem: Falls - Risk of:  Goal: Will remain free from falls  Description: Will remain free from falls  10/9/2021 0601 by Benton Broussard RN  Outcome: Ongoing    Bed alarm on & in place. 2/4 side rails up. Pt wearing non skid footwear. Bed locked & in lowest position. Call light within reach. For patient safety, an AVASYS camera has been placed in patient's room. AVASYS monitoring needed for Confusion, Increased Fall Risk, and Delirium. Writer placed call to monitor observer to verify camera number [id number] and review patient name, reason for monitoring, and contact information for primary RN. Patient notified of use of remote monitoring upon implementation of camera and verbalized understanding. Problem: Serum Glucose Level - Abnormal:  Goal: Ability to maintain appropriate glucose levels will improve  Description: Ability to maintain appropriate glucose levels will improve  Outcome: Ongoing   The patient will demonstrate an understanding of s/s & treatment of hypoglycemia, by verbalization,  with the goal of completion at discharge.

## 2021-10-09 NOTE — PROGRESS NOTES
Hospitalist Progress Note      PCP: Torie Silver    Date of Admission: 9/30/2021    Chief Complaint: [address] Course: H&P reviewed     Subjective: Ryan in place. Patient resting comfortably and denies any complaints. Cr improved today. Medications:  Reviewed    Infusion Medications    electrolyte-A 50 mL/hr at 10/08/21 2056    sodium chloride Stopped (10/02/21 0344)    dextrose       Scheduled Medications    [START ON 10/11/2021] epoetin long-epbx  1,000 Units SubCUTAneous Weekly    carvedilol  18.75 mg Oral BID WC    sodium bicarbonate  1,300 mg Oral TID    hydrALAZINE  10 mg Oral 3 times per day    insulin lispro  0-6 Units SubCUTAneous TID WC    insulin lispro  0-3 Units SubCUTAneous Nightly    tamsulosin  0.4 mg Oral Daily    isosorbide dinitrate  10 mg Oral TID    heparin (porcine)  5,000 Units SubCUTAneous 3 times per day    pantoprazole  40 mg Oral QAM AC    [Held by provider] insulin glargine  14 Units SubCUTAneous Nightly    atorvastatin  20 mg Oral Daily    aspirin  81 mg Oral Daily    sodium chloride flush  10 mL IntraVENous 2 times per day    Vitamin D  2,000 Units Oral Daily     PRN Meds: sodium chloride flush, sodium chloride, magnesium sulfate, promethazine **OR** ondansetron, senna, perflutren lipid microspheres, glucose, dextrose, glucagon (rDNA), dextrose      Intake/Output Summary (Last 24 hours) at 10/9/2021 0739  Last data filed at 10/9/2021 0533  Gross per 24 hour   Intake 1960.41 ml   Output 400 ml   Net 1560.41 ml       Physical Exam Performed:    /75   Pulse 96   Temp 98.3 °F (36.8 °C) (Oral)   Resp 16   Ht 5' 8\" (1.727 m)   Wt 139 lb 8.8 oz (63.3 kg)   SpO2 95%   BMI 21.22 kg/m²      General appearance: No apparent distress, appears stated age and cooperative. Legally blind  HEENT: Pupils equal, round, Conjunctivae/corneas clear. Neck: Supple, with full range of motion. No jugular venous distention.  Trachea midline. Respiratory:  Normal respiratory effort. Clear to auscultation, bilaterally without Rales/Wheezes/Rhonchi. Cardiovascular: Regular rate and rhythm with normal S1/S2 without murmurs, rubs or gallops. Abdomen: Soft, non-tender, non-distended with normal bowel sounds. Musculoskeletal: No overt significant bilateral edema  Skin:  Warm and dry  Neurologic:  Legally blind otherwise Neurovascularly intact without any focal sensory/motor deficits. Cranial nerves: II-XII intact, grossly non-focal.  Psychiatric: Alert and oriented,  limited insight       Labs:   Recent Labs     10/06/21  0847 10/07/21  0745 10/08/21  0640   WBC 4.7 5.3 4.1   HGB 8.8* 8.6* 8.5*   HCT 25.3* 25.0* 25.4*   * 103* 89*     Recent Labs     10/07/21  0745 10/08/21  0640 10/09/21  0623    142 141   K 4.2 4.6 4.6    109 108   CO2 19* 20* 19*   BUN 38* 39* 38*   CREATININE 4.1* 3.7* 3.4*   CALCIUM 8.2* 8.3 8.5     No results for input(s): AST, ALT, BILIDIR, BILITOT, ALKPHOS in the last 72 hours. Recent Labs     10/07/21  1242   INR 1.08     Recent Labs     10/07/21  0745   CKTOTAL 64       Urinalysis:      Lab Results   Component Value Date    NITRU Negative 09/30/2021    WBCUA 21-50 09/30/2021    BACTERIA 3+ 09/30/2021    RBCUA 5-10 09/30/2021    BLOODU SMALL 09/30/2021    SPECGRAV 1.015 09/30/2021    GLUCOSEU Negative 09/30/2021       Radiology:  NM Cardiac Stress Test Nuclear Imaging   Final Result      VL Extremity Venous Left   Final Result      XR CHEST PORTABLE   Final Result   1. Congestive heart failure. US RETROPERITONEAL COMPLETE   Final Result   Grossly unremarkable ultrasound of the kidneys and urinary bladder. No   hydronephrosis. CT HEAD WO CONTRAST   Final Result   No acute intracranial abnormality.       Small right mastoid effusion                 Assessment/Plan:    Active Hospital Problems    Diagnosis     Elevated troponin [R77.8]     Cardiomyopathy ([address] [I42.9]     Normocytic anemia [D64.9]     Acute renal failure superimposed on chronic kidney disease (HCC) [N17.9, N18.9]     Acute UTI [N39.0]     Acute encephalopathy [G93.40]     DM (diabetes mellitus), type 2 ([facility] [address] [E11.9]     Hypertension [I10]     Hyperlipidemia [E78.5]     CAD (coronary artery disease) [I25.10]      BRBPR with anemia: no acute GIB noted inpatient with no need for endoscopy at this time  per GI. Continue to follow clinically for any reoccurrence. hgb stable    Acute metabolic encephalopathy: likely due to UTI, worsening of underlying dementia. Neuro assisted MRI  Brain-unable to obtain d/t sternal wires. Continue supportive care, avoid sedatives    ADRIAN on CKD : likely prerenal , had improved with IVF. nephro assisting. Cr uptrending . nephro assisting. Started on IVF. May need to start dialysis if not improving. Monitor and avoid nephrotoxins    UTI with possible acute mastoditis:UC grew skin/urogenital rick. Completed treatment with IV ceftriaxone and IV Zyvox, d/obey on 10/4. DMII with hypoglycemia: Lantus held with no further hypoglycemia. Continue SSI . Monitor and adjust insulin doses as needed    Cardiomyopathy : Ef 15-20% on echo. continue beta-blocker, hydralazine and nitrate combination. No ACE/ARB/ARNI/aldosterone antagonist due to renal insufficiency. CAD with elevated trop : cardio assisting . Stress test abnormal-EF 32%, large scar with no ischemia. Medical management recommended per cardio. Continue BB, aspirin, statin    Anemia: likely anemia of chronic disease due to CKD. on retacrit weekly      Urinary retention: persistent despite getting flomax and getting intermittent straight cath. UTI has been treated. Urology recs appreciated. Continue Ryan    DVT Prophylaxis: scds  Diet: ADULT DIET; Regular; 4 carb choices (60 gm/meal);  Low Sodium (2 gm)  Adult Oral Nutrition Supplement; Diabetic Oral Supplement  Code Status: Full Code    PT/OT Eval Status: consulted    Dispo - likely when renal function stabilizes to SNF     DANGELO Patiño - STEPHANE

## 2021-10-10 LAB
ANION GAP SERPL CALCULATED.3IONS-SCNC: 11 MMOL/L (ref 3–16)
BUN BLDV-MCNC: 40 MG/DL (ref 7–20)
CALCIUM SERPL-MCNC: 8.5 MG/DL (ref 8.3–10.6)
CHLORIDE BLD-SCNC: 106 MMOL/L (ref 99–110)
CO2: 24 MMOL/L (ref 21–32)
CREAT SERPL-MCNC: 3.5 MG/DL (ref 0.8–1.3)
GFR AFRICAN AMERICAN: 21
GFR NON-AFRICAN AMERICAN: 17
GLUCOSE BLD-MCNC: 145 MG/DL (ref 70–99)
GLUCOSE BLD-MCNC: 155 MG/DL (ref 70–99)
GLUCOSE BLD-MCNC: 160 MG/DL (ref 70–99)
GLUCOSE BLD-MCNC: 179 MG/DL (ref 70–99)
GLUCOSE BLD-MCNC: 193 MG/DL (ref 70–99)
PERFORMED ON: ABNORMAL
POTASSIUM SERPL-SCNC: 4.7 MMOL/L (ref 3.5–5.1)
PRO-BNP: ABNORMAL PG/ML (ref 0–449)
SODIUM BLD-SCNC: 141 MMOL/L (ref 136–145)

## 2021-10-10 PROCEDURE — 36415 COLL VENOUS BLD VENIPUNCTURE: CPT

## 2021-10-10 PROCEDURE — 6370000000 HC RX 637 (ALT 250 FOR IP): Performed by: HOSPITALIST

## 2021-10-10 PROCEDURE — 6370000000 HC RX 637 (ALT 250 FOR IP): Performed by: INTERNAL MEDICINE

## 2021-10-10 PROCEDURE — 6370000000 HC RX 637 (ALT 250 FOR IP): Performed by: NURSE PRACTITIONER

## 2021-10-10 PROCEDURE — 80048 BASIC METABOLIC PNL TOTAL CA: CPT

## 2021-10-10 PROCEDURE — 83880 ASSAY OF NATRIURETIC PEPTIDE: CPT

## 2021-10-10 PROCEDURE — 1200000000 HC SEMI PRIVATE

## 2021-10-10 PROCEDURE — 2580000003 HC RX 258: Performed by: HOSPITALIST

## 2021-10-10 PROCEDURE — 6360000002 HC RX W HCPCS: Performed by: HOSPITALIST

## 2021-10-10 RX ORDER — FAMOTIDINE 20 MG/1
10 TABLET, FILM COATED ORAL DAILY
Status: DISCONTINUED | OUTPATIENT
Start: 2021-10-10 | End: 2021-10-20 | Stop reason: HOSPADM

## 2021-10-10 RX ADMIN — ASPIRIN 81 MG: 81 TABLET, COATED ORAL at 09:01

## 2021-10-10 RX ADMIN — SODIUM CHLORIDE, PRESERVATIVE FREE 10 ML: 5 INJECTION INTRAVENOUS at 09:02

## 2021-10-10 RX ADMIN — TAMSULOSIN HYDROCHLORIDE 0.4 MG: 0.4 CAPSULE ORAL at 09:01

## 2021-10-10 RX ADMIN — SODIUM BICARBONATE 650 MG TABLET 1300 MG: at 14:24

## 2021-10-10 RX ADMIN — INSULIN LISPRO 1 UNITS: 100 INJECTION, SOLUTION INTRAVENOUS; SUBCUTANEOUS at 09:03

## 2021-10-10 RX ADMIN — FAMOTIDINE 10 MG: 20 TABLET ORAL at 15:47

## 2021-10-10 RX ADMIN — ISOSORBIDE DINITRATE 10 MG: 10 TABLET ORAL at 20:18

## 2021-10-10 RX ADMIN — INSULIN LISPRO 1 UNITS: 100 INJECTION, SOLUTION INTRAVENOUS; SUBCUTANEOUS at 17:37

## 2021-10-10 RX ADMIN — INSULIN LISPRO 1 UNITS: 100 INJECTION, SOLUTION INTRAVENOUS; SUBCUTANEOUS at 12:54

## 2021-10-10 RX ADMIN — Medication 2000 UNITS: at 09:02

## 2021-10-10 RX ADMIN — SODIUM BICARBONATE 650 MG TABLET 1300 MG: at 09:02

## 2021-10-10 RX ADMIN — ONDANSETRON 4 MG: 2 INJECTION INTRAMUSCULAR; INTRAVENOUS at 10:25

## 2021-10-10 RX ADMIN — ISOSORBIDE DINITRATE 10 MG: 10 TABLET ORAL at 09:02

## 2021-10-10 RX ADMIN — HYDRALAZINE HYDROCHLORIDE 10 MG: 10 TABLET, FILM COATED ORAL at 04:32

## 2021-10-10 RX ADMIN — PANTOPRAZOLE SODIUM 40 MG: 40 TABLET, DELAYED RELEASE ORAL at 05:13

## 2021-10-10 RX ADMIN — SODIUM BICARBONATE 650 MG TABLET 1300 MG: at 20:18

## 2021-10-10 RX ADMIN — CARVEDILOL 18.75 MG: 6.25 TABLET, FILM COATED ORAL at 16:49

## 2021-10-10 RX ADMIN — INSULIN LISPRO 1 UNITS: 100 INJECTION, SOLUTION INTRAVENOUS; SUBCUTANEOUS at 20:18

## 2021-10-10 RX ADMIN — SODIUM CHLORIDE, PRESERVATIVE FREE 10 ML: 5 INJECTION INTRAVENOUS at 20:18

## 2021-10-10 RX ADMIN — ATORVASTATIN CALCIUM 20 MG: 10 TABLET, FILM COATED ORAL at 09:02

## 2021-10-10 RX ADMIN — CARVEDILOL 18.75 MG: 6.25 TABLET, FILM COATED ORAL at 09:01

## 2021-10-10 ASSESSMENT — PAIN SCALES - GENERAL
PAINLEVEL_OUTOF10: 0

## 2021-10-10 NOTE — PROGRESS NOTES
For patient safety, an AVASYS camera has been placed in patient's room. AVASYS monitoring needed for Confusion, Increased Fall Risk, Pulling at Lines and Elopement Risk. Writer placed call to monitor observer to verify camera number [id number] and review patient name, reason for monitoring, and contact information for primary RN. Patient and Family/Healthcare Decision Maker notified of use of remote monitoring upon implementation of camera and verbalized understanding.

## 2021-10-10 NOTE — PROGRESS NOTES
[facility]    [facility]              [phone]                         Interval History and plan:    awake  Has a roth  JVD up  Some edema   Creatinine 3.7-->3.4  Urine 400  But appears to be forgetful   advanced CKD  His estimated GFR is 14 mL however if he has a lot of muscle mass loss that tends to be inaccurate  May need to start dialysis      Plan:   D/C fluids     Check BNP in am                Acute Kidney Injury/ CKD 4  ADRIAN likely due to -prerenal etiology  Cr on consultation 3.3  Baseline Cr- 3  CKD likely due to diabetes hypertension recurrent ADRIAN  He was admitted to [facility] on 6/29- 7/8/21- with ADRIAN  Cr at DC was 3.38 from peak of 4.75(pre-renal)  UA- small blood, small nitrite, wbc 21-50  Renal Imaging:-10/21-right kidney 8.2 cm, left 8.3 cm, no hydronephrosis  Echo: 9/21-EF 15 to 72%, grade 2 diastolic dysfunction  9/84- nromal EF, RV pressure < 35 mmHg      Acute metabolic encephalopathy  f underlying dementia. Neuro assisted MRI  Brain-unable to obtain d/t sternal wires. Continue supportive care, avoid sedatives     UTI   . Completed treatment with IV ceftriaxone and IV Zyvox, d/obey on 10/4.     DMII        Cardiomyopathy   : Ef 15-20%     CAD   with elevated trop :     Medical management recommended per cardio. Continue BB, aspirin, statin     Anemia  : likely anemia of chronic disease due to CKD. on retacrit weekly       Urinary retention   flomax    intermittent straight cath. UTI   has been treated.     Urology   Roth           Hypertension   BP: (111-125)/(62-71)  Pulse:  [84-86]   BP goal inpatient [phone] systolic inpatient    Anemia of CKD  Iron sat normal  Hb 9.5 on consult    Acidosis  Oral bicarb     BPH  History of renal stones  CAD  Carotid artery occlusion  Diabetes mellitus        [facility] would like to thank Mary Beth Rasheed MD   for opportunity to serve this patient      Please call with questions at-   24 Hrs Answering service [phone] or  7 am- 5 pm via Perfect serve or cell phone  Carlos Gomez MD          CC/reason for consult :     ADRIAN     HPI :     Francisca Moreno is a 78 y.o. male presented to   the hospital on 9/30/2021 with altered mental status from nursing facility. He is unable to provide details. Per reports he has had bright red blood per rectum. He was brought to the emergency room where work-up showed possible inferolateral ischemia on the heart and had QTc prolongation. CT scan of the head was done which was negative. He is scheduled to do MRI but has not done yet. He was found to have metabolic acidosis, ADRIAN. Also elevation of troponin. He had a UA done which showed possible infection, culture is pending also blood culture is sent and results are pending. He is currently on IV antibiotics. We are consulted for ADRIAN and related issues    ROS:     Positives Listed Bold. All other remaining systems are negative. Constitutional:  fever, chills, weakness, weight change, fatigue,      Skin:  rash, pruritus, hair loss, bruising, dry skin, petechiae. Head, Face, Neck   headaches, swelling,  cervical adenopathy. Respiratory: shortness of breath, cough, or wheezing  Cardiovascular: chest pain, palpitations, dizzy, edema  Gastrointestinal: nausea, vomiting, diarrhea, constipation,belly pain    Yellow skin, blood in stool  Musculoskeletal:  back pain, muscle weakness, gait problems,       joint pain or swelling. Genitourinary:  dysuria, poor urine flow, flank pain, blood in urine  Neurologic:  vertigo, TIA'S, syncope, seizures, focal weakness  Psychosocial:  insomnia, anxiety, or depression.   Additional positive findings: -         PMH/PSH/SH/Family History:     Past Medical History:   Diagnosis Date    CAD (coronary artery disease)     Chronic renal disease     Dr. 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 He has never used smokeless tobacco. He reports that he does not drink alcohol and does not use drugs. family history includes Diabetes in his mother.          Medication:     Current Facility-Administered Medications: electrolyte-A (PLASMALYTE-A) solution, , IntraVENous, Continuous  [START ON 10/11/2021] epoetin long-epbx (RETACRIT) injection 1,000 Units, 1,000 Units, SubCUTAneous, Weekly  carvedilol (COREG) tablet 18.75 mg, 18.75 mg, Oral, BID WC  sodium bicarbonate tablet 1,300 mg, 1,300 mg, Oral, TID  hydrALAZINE (APRESOLINE) tablet 10 mg, 10 mg, Oral, 3 times per day  insulin lispro (HUMALOG) injection vial 0-6 Units, 0-6 Units, SubCUTAneous, TID WC  insulin lispro (HUMALOG) injection vial 0-3 Units, 0-3 Units, SubCUTAneous, Nightly  tamsulosin (FLOMAX) capsule 0.4 mg, 0.4 mg, Oral, Daily  isosorbide dinitrate (ISORDIL) tablet 10 mg, 10 mg, Oral, TID  heparin (porcine) injection 5,000 Units, 5,000 Units, SubCUTAneous, 3 times per day  pantoprazole (PROTONIX) tablet 40 mg, 40 mg, Oral, QAM AC  [Held by provider] insulin glargine (LANTUS) injection vial 14 Units, 14 Units, SubCUTAneous, Nightly  atorvastatin (LIPITOR) tablet 20 mg, 20 mg, Oral, Daily  aspirin EC tablet 81 mg, 81 mg, Oral, Daily  sodium chloride flush 0.9 % injection 10 mL, 10 mL, IntraVENous, 2 times per day  sodium chloride flush 0.9 % injection 10 mL, 10 mL, IntraVENous, PRN  0.9 % sodium chloride infusion, 25 mL, IntraVENous, PRN  magnesium sulfate 2000 mg in 50 mL IVPB premix, 2,000 mg, IntraVENous, PRN  promethazine (PHENERGAN) tablet 12.5 mg, 12.5 mg, Oral, Q6H PRN **OR** ondansetron (ZOFRAN) injection 4 mg, 4 mg, IntraVENous, Q6H PRN  senna (SENOKOT) tablet 8.6 mg, 1 tablet, Oral, Daily PRN  perflutren lipid microspheres (DEFINITY) injection 1.65 mg, 1.5 mL, IntraVENous, ONCE PRN  glucose (GLUTOSE) 40 % oral gel 15 g, 15 g, Oral, PRN  dextrose 50 % IV solution, 12.5 g, IntraVENous, PRN  glucagon (rDNA) injection 1 mg, 1 mg, IntraMUSCular, PRN  dextrose 5 % solution, 100 mL/hr, IntraVENous, PRN  Vitamin D (CHOLECALCIFEROL) tablet 2,000 Units, 2,000 Units, Oral, Daily       Vitals :     Vitals:    10/09/21 1615   BP: 111/62   Pulse: 86   Resp: 16   Temp: 98.6 °F (37 °C)   SpO2: 95%       I & O :       Intake/Output Summary (Last 24 hours) at 10/9/2021 2058  Last data filed at 10/9/2021 1953  Gross per 24 hour   Intake 3412.91 ml   Output 400 ml   Net 3012.91 ml        Physical Examination :     General appearance: male in NAD, alert and awake. HEENT: EOM intact, no icterus. Trachea is midline. Neck : No mass, appears symmetrical, no JVD   Respiratory: Respiratory effort appears normal, no wheeze, no crackles  Cardiovascular: Ausculation- No M/R/G, no edema  Abdomen: No visible mass or tenderness  Musculoskeletal:  No clubbing,cyanosis, joints with no swelling or deformity. Skin:no rashes, ulcers, induration, no jaundice. Neuro: face symmetric, no focal deficits. Appropriate responses.  CN 2-12 grossly intact    Additional findings:-        LABS:     Recent Labs     10/07/21  0745 10/08/21  0640 10/09/21  0709   WBC 5.3 4.1 3.4*   HGB 8.6* 8.5* 8.4*   HCT 25.0* 25.4* 24.9*   * 89* 69*     Recent Labs     10/07/21  0745 10/08/21  0640 10/09/21  0623    142 141   K 4.2 4.6 4.6    109 108   CO2 19* 20* 19*   BUN 38* 39* 38*   CREATININE 4.1* 3.7* 3.4*   GLUCOSE 77 149* 140*

## 2021-10-10 NOTE — PROGRESS NOTES
MD notified: Pt /58 HR SV 80. Hydralazine 10mg Q8 and Isordil 10mg Q8 ordered but not given due to low BP. would you like to add parameters? SubQ heparin 5,000 Q8 ordered and not given due to platelet count of 69 k/ul.

## 2021-10-10 NOTE — PROGRESS NOTES
Hospitalist Progress Note  10/10/2021 2:40 PM  Subjective:   Admit Date: 9/30/2021  PCP: Donald Amin Status: Inpatient [101]  Interval History: Hospital Day: 11, admitted with acute kidney injury, GI bleed and acute metabolic encephalopathy related to UTI and underlying dementia. Diet: Controlled carbohydrate (60 gm/meal) with 2 gram sodium restriction  Medications:     epoetin long-epbx  1,000 Units SubCUTAneous Weekly   carvedilol  18.75 mg Oral BID WC   sodium bicarbonate  1,300 mg Oral TID   hydralazine  10 mg Oral TID [hold]   insulin lispro SSI   0-6 Units SubCUTAneous TID WC   tamsulosin  0.4 mg Oral Daily   isosorbide dinitrate  10 mg Oral TID   heparin   5,000 Units SubCUTAneous 3 times per day   pantoprazole  40 mg Oral QAM AC   insulin glargine  14 Units SubCUTAneous Nightly [held]   atorvastatin  20 mg Oral Daily   aspirin  81 mg Oral Daily   Vitamin D  2,000 Units Oral Daily     Recent Labs     10/08/21  0640 10/09/21  0709   WBC 4.1 3.4*   HGB 8.5* 8.4*   PLT 89* 69*   MCV 96.4 96.7     Recent Labs     10/08/21  0640 10/09/21  0623 10/10/21  0658    141 141   K 4.6 4.6 4.7    108 106   CO2 20* 19* 24   BUN 39* 38* 40*   CREATININE 3.7* 3.4* 3.5*   GLUCOSE 149* 140* 145*     POC Glucose:   10/09/21  1748 10/09/21  1953 10/10/21  0823 10/10/21  1150   184* 196* 155* 193*     proBNP (10/10) 59,081 pg/mL  Procalcitonin (10/4) 0.20 ng/mL    SARS-CoV-2 NAAT (10/5) not detected     Cardiac Stress Test Nuclear Imaging (10/4) LV size is dilated and severely reduced systolic function. Left ventricular ejection fraction of 32%. Moderate global hypokinesis with severe hypokinesis of the basal and mid inferior wall. There is a large defect in the basal and mid inferior wall at stress that does not reverse consistent  with scar. No gross ischemia.     Portable CXR (10/1) There is moderate pulmonary vascular congestion as well as a small to moderate right pleural effusion.  The cardiac silhouette is enlarged status post median sternotomy and CABG.  2 wires are coiled overlying the left hemithorax. Vipul Duff is no pneumothorax    Echocardiogram (9/30) The left ventricular systolic function is severely reduced with an ejection fraction of 15-20%. There is global with regional variations of wall motion, inferior wall moves best.  Grade II diastolic dysfunction with elevated filling pressure. Moderate mitral regurgitation. Mild aortic, tricuspid and aortic regurgitation. Right ventricular systolic function is moderately reduced . Systolic pulmonic artery pressure (SPAP) is estimated at 48 mmHg consistent with mild pulmonary hypertension (Right atrial pressure of 8 mmHg). Objective:   Vitals:  /58   Pulse 82   Temp 97.7 °F (axillary)   Resp 16   Ht 5' 8\"  Wt 63 kg  SpO2 95%   BMI 21.12 kg/m²   General appearance: not oriented, alert and cooperative with exam  Lungs: clear to auscultation bilaterally  Heart: regular rate and rhythm, S1, S2 normal, no murmur, click, rub or gallop  Abdomen: soft, non-tender; bowel sounds normal; no masses,  no organomegaly  Extremities: extremities normal, atraumatic, no cyanosis or edema  Neurologic: No obvious focal neurologic deficits. Assessment and Plan:   1. Acute kidney injury on CKD marianna 4. ADRIAN likely due to pre-renal etiology per nephrology. Baseline creatinine 3.0 and creatinine in the hospital around 3.5 mmol/L.  CKD likely due to diabetes, hypertension, and recurrent ADRIAN. Continues on sodium bicarbonate 1,300 mg TID. 2. Grade 2 diastolic heart failure:  Volume sensitive which complicates volume expansion to improve kidney function. 3. Acute metabolic encephalopathy on underlying dementia. MRI brain precluded by sternal wires. 4. Urinary tract infection:  Completed antibiotic therapy with IV ceftriaxone and Zyvox (10/4). 5. Type 2 Diabetes (controlled, HgbA1c 5.9%). Glargine insulin held.   Continues on \"sliding scale\" lispro lose dose scale (0-6 units) prandial correction insulin. 6. Ischemic Cardiomyopathy (EF 15-20%). Elevated troponin. Continues on aspirin 81 mg daily, carvedilol 18.75 mg BID. 7. Anemia of chronic disease due to CKD, continues on epoetin long-epbx 1,000 units sub-Q weekly. Hemoglobin relatively stable at 8.5 gm/dL. Iron saturation 28%. 8. Hypertension:  Started on hydralazine 10 mg TID with low systolic blood pressure. Hold for now. Continues on Isordil 10 mg TID. Plasmalyte for low blood pressure. 9. Gastric reflux and stress ulcer prophylaxis with pantoprazole (Protonix) 40 mg daily. PPI can exacerbate renal function, transition to oral famotidine (Pepcid) 20 mg BID. 10. Thrombocytopenia:  Discontinue heparin. Advance Directive: Full Code  DVT prophylaxis with (No heparin due to thrombocytopenia)  Discharge planning:  [place]. Plan to return to [address] at discharge. Discharge cancelled early in week due to increased Creatinine.        Salome Christian MD  [facility] Hospitalist

## 2021-10-10 NOTE — PLAN OF CARE
Problem: Falls - Risk of:  Goal: Will remain free from falls  Description: Will remain free from falls  Outcome: Ongoing     Problem: Falls - Risk of:  Goal: Absence of physical injury  Description: Absence of physical injury  Outcome: Ongoing   For patient safety, Visual Surveillance is in place. Fall precautions in place at all times. Bed maintained in lowest position, wheels locked, non skid footwear applied, and call light within reach. Will cont. Plan of care.

## 2021-10-10 NOTE — PROGRESS NOTES
[facility]    [facility]. [facility]              [phone]                         Interval History and plan:    resting   Has a roth  JVD up  Some edema   Creatinine 3.7-->3.4-->3.5  Urine 400-->450   forgetful   advanced CKD  His estimated GFR is 14 mL however if he has a lot of muscle mass loss that tends to be inaccurate  May need to start dialysis  BNP 49765  /62    Plan:   Off IV fluids              Acute Kidney Injury/ CKD 4  ADRIAN likely due to -prerenal etiology  Cr on consultation 3.3  Baseline Cr- 3  CKD likely due to diabetes hypertension recurrent ADRIAN  He was admitted to [facility] on 6/29- 7/8/21- with ADRIAN  Cr at DC was 3.38 from peak of 4.75(pre-renal)  UA- small blood, small nitrite, wbc 21-50  Renal Imaging:-10/21-right kidney 8.2 cm, left 8.3 cm, no hydronephrosis  Echo: 9/21-EF 15 to 38%, grade 2 diastolic dysfunction  9/77- nromal EF, RV pressure < 35 mmHg      Acute metabolic encephalopathy  f underlying dementia. Neuro assisted MRI  Brain-unable to obtain d/t sternal wires. Continue supportive care, avoid sedatives     UTI   . Completed treatment with IV ceftriaxone and IV Zyvox, d/obey on 10/4.     DMII        Cardiomyopathy   : Ef 15-20%     CAD   with elevated trop :     Medical management recommended per cardio. Continue BB, aspirin, statin     Anemia  : likely anemia of chronic disease due to CKD. on retacrit weekly       Urinary retention   flomax    intermittent straight cath. UTI   has been treated.     Urology   Roth           Hypertension   BP: (120-127)/(60-62)  Pulse:  [91]   BP goal inpatient [phone] systolic inpatient    Anemia of CKD  Iron sat normal  Hb 9.5 on consult    Acidosis  Oral bicarb     BPH  History of renal stones  CAD  Carotid artery occlusion  Diabetes mellitus        [facility] would like to thank Meenu Haq MD   for opportunity to serve this patient      Please call with questions at-   24 Hrs Answering service [phone] or  7 am- 5 pm via Perfect serve or cell phone  Finn Hartman MD          CC/reason for consult :     ADRIAN     HPI :     Marion Mueller is a 78 y.o. male presented to   the hospital on 9/30/2021 with altered mental status from nursing facility. He is unable to provide details. Per reports he has had bright red blood per rectum. He was brought to the emergency room where work-up showed possible inferolateral ischemia on the heart and had QTc prolongation. CT scan of the head was done which was negative. He is scheduled to do MRI but has not done yet. He was found to have metabolic acidosis, ADRIAN. Also elevation of troponin. He had a UA done which showed possible infection, culture is pending also blood culture is sent and results are pending. He is currently on IV antibiotics. We are consulted for ADRIAN and related issues    ROS:     Positives Listed Bold. All other remaining systems are negative. Constitutional:  fever, chills, weakness, weight change, fatigue,      Skin:  rash, pruritus, hair loss, bruising, dry skin, petechiae. Head, Face, Neck   headaches, swelling,  cervical adenopathy. Respiratory: shortness of breath, cough, or wheezing  Cardiovascular: chest pain, palpitations, dizzy, edema  Gastrointestinal: nausea, vomiting, diarrhea, constipation,belly pain    Yellow skin, blood in stool  Musculoskeletal:  back pain, muscle weakness, gait problems,       joint pain or swelling. Genitourinary:  dysuria, poor urine flow, flank pain, blood in urine  Neurologic:  vertigo, TIA'S, syncope, seizures, focal weakness  Psychosocial:  insomnia, anxiety, or depression.   Additional positive findings: -         PMH/PSH/SH/Family History:     Past Medical History:   Diagnosis Date    CAD (coronary artery disease)     Chronic renal disease     Dr. Chávez Cornea Hyperlipidemia     Hypertension     Retinopathy due to secondary DM ([facility] [address]     Type II or unspecified type diabetes mellitus without mention of complication, not stated as uncontrolled        Past Surgical History:   Procedure Laterality Date    CARDIAC SURGERY  2006    quad by pass    COLONOSCOPY      pt refuses to have one    EYE SURGERY Bilateral 2003   [address] LUNG SURGERY  2006    scraped lungs        reports that he has never smoked. He has never used smokeless tobacco. He reports that he does not drink alcohol and does not use drugs. family history includes Diabetes in his mother.          Medication:     Current Facility-Administered Medications: [START ON 10/11/2021] epoetin long-epbx (RETACRIT) injection 1,000 Units, 1,000 Units, SubCUTAneous, Weekly  carvedilol (COREG) tablet 18.75 mg, 18.75 mg, Oral, BID WC  sodium bicarbonate tablet 1,300 mg, 1,300 mg, Oral, TID  hydrALAZINE (APRESOLINE) tablet 10 mg, 10 mg, Oral, 3 times per day  insulin lispro (HUMALOG) injection vial 0-6 Units, 0-6 Units, SubCUTAneous, TID WC  insulin lispro (HUMALOG) injection vial 0-3 Units, 0-3 Units, SubCUTAneous, Nightly  tamsulosin (FLOMAX) capsule 0.4 mg, 0.4 mg, Oral, Daily  isosorbide dinitrate (ISORDIL) tablet 10 mg, 10 mg, Oral, TID  heparin (porcine) injection 5,000 Units, 5,000 Units, SubCUTAneous, 3 times per day  pantoprazole (PROTONIX) tablet 40 mg, 40 mg, Oral, QAM AC  [Held by provider] insulin glargine (LANTUS) injection vial 14 Units, 14 Units, SubCUTAneous, Nightly  atorvastatin (LIPITOR) tablet 20 mg, 20 mg, Oral, Daily  aspirin EC tablet 81 mg, 81 mg, Oral, Daily  sodium chloride flush 0.9 % injection 10 mL, 10 mL, IntraVENous, 2 times per day  sodium chloride flush 0.9 % injection 10 mL, 10 mL, IntraVENous, PRN  0.9 % sodium chloride infusion, 25 mL, IntraVENous, PRN  magnesium sulfate 2000 mg in 50 mL IVPB premix, 2,000 mg, IntraVENous, PRN  promethazine (PHENERGAN) tablet 12.5 mg, 12.5 mg, Oral, Q6H PRN **OR** ondansetron (ZOFRAN) injection 4 mg, 4 mg, IntraVENous, Q6H PRN  senna

## 2021-10-11 ENCOUNTER — APPOINTMENT (OUTPATIENT)
Dept: INTERVENTIONAL RADIOLOGY/VASCULAR | Age: 76
DRG: 673 | End: 2021-10-11
Payer: MEDICARE

## 2021-10-11 LAB
ANION GAP SERPL CALCULATED.3IONS-SCNC: 14 MMOL/L (ref 3–16)
BUN BLDV-MCNC: 46 MG/DL (ref 7–20)
CALCIUM SERPL-MCNC: 8.3 MG/DL (ref 8.3–10.6)
CHLORIDE BLD-SCNC: 107 MMOL/L (ref 99–110)
CO2: 23 MMOL/L (ref 21–32)
CREAT SERPL-MCNC: 4.3 MG/DL (ref 0.8–1.3)
GFR AFRICAN AMERICAN: 16
GFR NON-AFRICAN AMERICAN: 14
GLUCOSE BLD-MCNC: 122 MG/DL (ref 70–99)
GLUCOSE BLD-MCNC: 126 MG/DL (ref 70–99)
GLUCOSE BLD-MCNC: 130 MG/DL (ref 70–99)
GLUCOSE BLD-MCNC: 136 MG/DL (ref 70–99)
GLUCOSE BLD-MCNC: 156 MG/DL (ref 70–99)
PERFORMED ON: ABNORMAL
POTASSIUM SERPL-SCNC: 4.2 MMOL/L (ref 3.5–5.1)
SODIUM BLD-SCNC: 144 MMOL/L (ref 136–145)

## 2021-10-11 PROCEDURE — 76937 US GUIDE VASCULAR ACCESS: CPT

## 2021-10-11 PROCEDURE — 90935 HEMODIALYSIS ONE EVALUATION: CPT

## 2021-10-11 PROCEDURE — 97116 GAIT TRAINING THERAPY: CPT

## 2021-10-11 PROCEDURE — 99153 MOD SED SAME PHYS/QHP EA: CPT

## 2021-10-11 PROCEDURE — 2580000003 HC RX 258: Performed by: STUDENT IN AN ORGANIZED HEALTH CARE EDUCATION/TRAINING PROGRAM

## 2021-10-11 PROCEDURE — 99152 MOD SED SAME PHYS/QHP 5/>YRS: CPT

## 2021-10-11 PROCEDURE — 80048 BASIC METABOLIC PNL TOTAL CA: CPT

## 2021-10-11 PROCEDURE — 94761 N-INVAS EAR/PLS OXIMETRY MLT: CPT

## 2021-10-11 PROCEDURE — 1200000000 HC SEMI PRIVATE

## 2021-10-11 PROCEDURE — 97530 THERAPEUTIC ACTIVITIES: CPT

## 2021-10-11 PROCEDURE — 97535 SELF CARE MNGMENT TRAINING: CPT

## 2021-10-11 PROCEDURE — 6360000002 HC RX W HCPCS: Performed by: STUDENT IN AN ORGANIZED HEALTH CARE EDUCATION/TRAINING PROGRAM

## 2021-10-11 PROCEDURE — 0JH63XZ INSERTION OF TUNNELED VASCULAR ACCESS DEVICE INTO CHEST SUBCUTANEOUS TISSUE AND FASCIA, PERCUTANEOUS APPROACH: ICD-10-PCS | Performed by: STUDENT IN AN ORGANIZED HEALTH CARE EDUCATION/TRAINING PROGRAM

## 2021-10-11 PROCEDURE — 36415 COLL VENOUS BLD VENIPUNCTURE: CPT

## 2021-10-11 PROCEDURE — 5A1D70Z PERFORMANCE OF URINARY FILTRATION, INTERMITTENT, LESS THAN 6 HOURS PER DAY: ICD-10-PCS | Performed by: INTERNAL MEDICINE

## 2021-10-11 PROCEDURE — C1894 INTRO/SHEATH, NON-LASER: HCPCS

## 2021-10-11 PROCEDURE — 6370000000 HC RX 637 (ALT 250 FOR IP): Performed by: INTERNAL MEDICINE

## 2021-10-11 PROCEDURE — 77001 FLUOROGUIDE FOR VEIN DEVICE: CPT

## 2021-10-11 PROCEDURE — 36558 INSERT TUNNELED CV CATH: CPT

## 2021-10-11 PROCEDURE — 2700000000 HC OXYGEN THERAPY PER DAY

## 2021-10-11 PROCEDURE — 2580000003 HC RX 258: Performed by: HOSPITALIST

## 2021-10-11 PROCEDURE — 6370000000 HC RX 637 (ALT 250 FOR IP): Performed by: NURSE PRACTITIONER

## 2021-10-11 PROCEDURE — 6370000000 HC RX 637 (ALT 250 FOR IP): Performed by: HOSPITALIST

## 2021-10-11 PROCEDURE — 97110 THERAPEUTIC EXERCISES: CPT

## 2021-10-11 RX ORDER — FENTANYL CITRATE 50 UG/ML
INJECTION, SOLUTION INTRAMUSCULAR; INTRAVENOUS
Status: COMPLETED | OUTPATIENT
Start: 2021-10-11 | End: 2021-10-11

## 2021-10-11 RX ORDER — MIDAZOLAM HYDROCHLORIDE 5 MG/ML
INJECTION INTRAMUSCULAR; INTRAVENOUS
Status: COMPLETED | OUTPATIENT
Start: 2021-10-11 | End: 2021-10-11

## 2021-10-11 RX ADMIN — ATORVASTATIN CALCIUM 20 MG: 10 TABLET, FILM COATED ORAL at 10:18

## 2021-10-11 RX ADMIN — FAMOTIDINE 10 MG: 20 TABLET ORAL at 10:18

## 2021-10-11 RX ADMIN — FENTANYL CITRATE 25 MCG: 50 INJECTION INTRAMUSCULAR; INTRAVENOUS at 15:17

## 2021-10-11 RX ADMIN — MIDAZOLAM HYDROCHLORIDE 0.5 MG: 5 INJECTION, SOLUTION INTRAMUSCULAR; INTRAVENOUS at 15:17

## 2021-10-11 RX ADMIN — ISOSORBIDE DINITRATE 10 MG: 10 TABLET ORAL at 10:19

## 2021-10-11 RX ADMIN — ISOSORBIDE DINITRATE 10 MG: 10 TABLET ORAL at 21:39

## 2021-10-11 RX ADMIN — SODIUM CHLORIDE, PRESERVATIVE FREE 10 ML: 5 INJECTION INTRAVENOUS at 10:19

## 2021-10-11 RX ADMIN — CARVEDILOL 18.75 MG: 6.25 TABLET, FILM COATED ORAL at 10:18

## 2021-10-11 RX ADMIN — SODIUM BICARBONATE 650 MG TABLET 1300 MG: at 10:22

## 2021-10-11 RX ADMIN — SODIUM CHLORIDE, PRESERVATIVE FREE 10 ML: 5 INJECTION INTRAVENOUS at 21:38

## 2021-10-11 RX ADMIN — TAMSULOSIN HYDROCHLORIDE 0.4 MG: 0.4 CAPSULE ORAL at 10:18

## 2021-10-11 RX ADMIN — Medication 2000 UNITS: at 10:18

## 2021-10-11 RX ADMIN — SODIUM BICARBONATE 650 MG TABLET 1300 MG: at 21:39

## 2021-10-11 RX ADMIN — CEFAZOLIN 2000 MG: 10 INJECTION, POWDER, FOR SOLUTION INTRAVENOUS at 15:15

## 2021-10-11 ASSESSMENT — PAIN SCALES - GENERAL: PAINLEVEL_OUTOF10: 0

## 2021-10-11 NOTE — PRE SEDATION
Sedation Pre-Procedure Note    Patient Name: Nia Lopez   YOB: 1945  Room/Bed: [facility]/5949-62  Medical Record Number: [id number]  Date: 10/11/2021   Time: 1:38 PM       Indication:  Pt is a 79 y/o M with CKD/ESRD here for tunneled dialysis catheter placement. Consent: I have discussed with the patient and/or the patient representative the indication, alternatives, and the possible risks and/or complications of the planned procedure and the anesthesia methods. The patient and/or patient representative appear to understand and agree to proceed. 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 Past Surgical History:   has a past surgical history that includes Cardiac surgery (2006); Lung surgery (2006); Kidney stone surgery (1996); Eye surgery (Bilateral, 2003); and Colonoscopy.     Medications:   Scheduled Meds:    ceFAZolin  2,000 mg IntraVENous Once    famotidine  10 mg Oral Daily    epoetin long-epbx  1,000 Units SubCUTAneous Weekly    carvedilol  18.75 mg Oral BID WC    sodium bicarbonate  1,300 mg Oral TID    [Held by provider] hydrALAZINE  10 mg Oral 3 times per day    insulin lispro  0-6 Units SubCUTAneous TID WC    insulin lispro  0-3 Units SubCUTAneous Nightly    tamsulosin  0.4 mg Oral Daily    isosorbide dinitrate  10 mg Oral TID    [Held by provider] insulin glargine  14 Units SubCUTAneous Nightly    atorvastatin  20 mg Oral Daily    aspirin  81 mg Oral Daily    sodium chloride flush  10 mL IntraVENous 2 times per day    Vitamin D  2,000 Units Oral Daily     Continuous Infusions:    sodium chloride Stopped (10/02/21 0301)    dextrose       PRN Meds: sodium chloride flush, sodium chloride, magnesium sulfate, promethazine **OR** ondansetron, senna, perflutren lipid microspheres, glucose, dextrose, glucagon (rDNA), dextrose  Home Meds:   Prior to Admission medications    Medication Sig Start Date End Date Taking? Authorizing Provider   atorvastatin (LIPITOR) 40 MG tablet Take 40 mg by mouth nightly   Yes Historical Provider, MD   guaiFENesin (ROBITUSSIN) 100 MG/5ML liquid Take 200 mg by mouth 3 times daily as needed for Cough   Yes Historical Provider, MD   sodium bicarbonate 650 MG tablet Take 650 mg by mouth daily   Yes Historical Provider, MD   glipiZIDE (GLUCOTROL) 10 MG tablet TAKE ONE TABLET BY MOUTH TWICE A DAY BEFORE MEALS  Patient taking differently: 5 mg 2 times daily (before meals)  7/25/16   Enma Craven,    insulin detemir (LEVEMIR FLEXTOUCH) 100 UNIT/ML injection pen Inject 14 Units into the skin nightly 6/6/16   Enma Craven DO   torsemide (DEMADEX) 20 MG tablet Take 1/2---1  qd 1/27/16   Taylor Lockett DO   aspirin 81 MG tablet Take 81 mg by mouth daily    Historical Provider, MD   glucose blood VI test strips (ACCU-CHEK ROBERT) STRP 1 Container by In Vitro route daily 7/14/15   Nestor Lockett DO   Insulin Pen Needle 31G X 8 MM MISC 1 each by Does not apply route daily. 11/4/14   Taylor Lockett, DO   Ascorbic Acid (VITAMIN C) 250 MG tablet Take 500 mg by mouth daily. Historical Provider, MD   Cholecalciferol (VITAMIN D3) 2000 UNITS CAPS Take  by mouth daily. Historical Provider, MD   carvedilol (COREG) 25 MG tablet Take 12.5 mg by mouth 2 times daily (with meals)     Historical Provider, MD   Iron TABS Take 25 mg by mouth. Historical Provider, MD   Omega-3 Fatty Acids (OMEGA 3 PO) Take 1,200 mg by mouth 3 times daily.     Historical Provider, MD     Coumadin Use Last 7 Days:  no  Antiplatelet drug therapy use last 7 days: no  Other anticoagulant use last 7 days: no  Additional Medication Information:  None      Pre-Sedation Documentation and Exam:   I have reviewed the patient's history and review of systems.     Mallampati Airway Assessment:  Mallampati Class II - (soft palate, fauces & uvula are visible)    Prior History of Anesthesia Complications:   none    ASA Classification:  Class 3 - A patient with severe systemic disease that limits activity but is not incapacitating    Sedation/ Anesthesia Plan:   intravenous sedation    Medications Planned:   midazolam (Versed) intravenously and fentanyl intravenously    Patient is an appropriate candidate for plan of sedation: yes    Electronically signed by Noy Lomeli MD on 10/11/2021 at 1:38 PM

## 2021-10-11 NOTE — BRIEF OP NOTE
Brief Postoperative Note    Romy De Los Santos  YOB: 1945  [id number]    Pre-operative Diagnosis: ESRD, need for HD    Post-operative Diagnosis: Same    Procedure: Insertion of Tunneled CVC    Anesthesia: Local and Moderate Sedation    Surgeons/Assistants: Lennette Gilford, MD    Estimated Blood Loss: less than 5 mL    Complications: None    Specimens: Was Not Obtained    Findings: Vascath successfully placed in right internal jugular vein  Tip of vascath visualized in right atrium. Aspirated, Flushed, and HD lumens Locked with heparin.    OK to Use    Electronically signed by Lennette Gilford, MD on 10/11/2021 at 3:47 PM

## 2021-10-11 NOTE — PROGRESS NOTES
Occupational Therapy  Facility/Department: [facility] B3 - MED SURG  Daily Treatment Note  NAME: Aiden Mckinney  : 1945  MRN: [id number]    Date of Service: 10/11/2021    Discharge Recommendations:  Subacute/Skilled Nursing Facility       Assessment   Performance deficits / Impairments: Decreased functional mobility ; Decreased ADL status; Decreased cognition;Decreased safe awareness;Decreased strength;Decreased endurance;Decreased balance;Decreased high-level IADLs;Decreased fine motor control;Decreased vision/visual deficit; Decreased coordination  Assessment: Pt seen for cotx with PT d.t assist level, cognition and decreased activity tolerance and to promote increased outocmes vs 1:1 session. Pt confused iwth poorSTM noted, difficult to redirect. Pt requires mod A x 2 for sit to stands and for fucntional mobility wtih RW, d/t posterior lean, poor vision and cognition. Confused with ADl tasks. Cont to recommned SNF at discharge. OT Education: OT Role;Plan of Care;Transfer Training;Orientation;Home Exercise Program  Patient Education: not an independent learner  Barriers to Learning: cognition  Activity Tolerance  Activity Tolerance: Patient Tolerated treatment well  Activity Tolerance: 115/58 HR 74 O2 91% on RA  Safety Devices  Safety Devices in place: Yes  Type of devices: Left in chair;Chair alarm in place;Call light within reach;Nurse notified;Gait belt         Patient Diagnosis(es): The primary encounter diagnosis was Acute renal failure superimposed on chronic kidney disease, unspecified CKD stage, unspecified acute renal failure type ([facility] [address] Diagnoses of Urinary tract infection without hematuria, site unspecified and Altered mental status, unspecified altered mental status type were also pertinent to this visit.       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 has a past surgical history that includes Cardiac surgery (2006); Lung surgery (2006); Kidney stone surgery (1996); Eye surgery (Bilateral, 2003); and Colonoscopy. Restrictions  Restrictions/Precautions  Restrictions/Precautions: Up as Tolerated, General Precautions, Fall Risk  Position Activity Restriction  Other position/activity restrictions: [employer] monitor in room, pt is legally blind, CHEL roth  Subjective   General  Chart Reviewed: Yes  Patient assessed for rehabilitation services?: Yes  Response to previous treatment: Patient with no complaints from previous session  Family / Caregiver Present: No  Subjective  Subjective: Pt resting in bed, pleasant and agreeable to OT treatment. General Comment  Comments: RN clears for therapy  Vital Signs  Patient Currently in Pain: Denies   Orientation  Orientation  Orientation Level: Oriented to person;Disoriented to time;Disoriented to place; Disoriented to situation  Objective    ADL  Feeding: Setup;Verbal cueing; Increased time to complete  UE Dressing: Dependent/Total  LE Dressing: Dependent/Total  Toileting: Dependent/Total (gonzalez)  Additional Comments: pt confused, difficulty following commands this date        Balance  Sitting Balance: Contact guard assistance  Standing Balance: Moderate assistance  Standing Balance  Time: 1-2 min x 2  Activity: bed to chair with RW  Comment: then back to bed with HHA x 2 and mod/max A d/t going for procedure soon  Functional Mobility  Functional - Mobility Device: Rolling Walker  Activity: Other  Assist Level: Dependent/Total  Functional Mobility Comments: Mod A x 2     Transfers  Sit to stand:  Moderate assistance;2 Person assistance  Stand to sit: Moderate assistance;2 Person assistance  Transfer Comments: mod A x 2 posterior lean                       Cognition  Overall Cognitive Status: Exceptions  Arousal/Alertness: Delayed responses to stimuli  Following Commands: Follows one step commands with repetition; Follows one step commands with increased time  Attention Span: Attends with cues to redirect; Difficulty attending to directions  Memory: Decreased recall of biographical Information;Decreased short term memory;Decreased recall of recent events;Decreased recall of precautions;Decreased long term memory  Safety Judgement: Decreased awareness of need for safety  Problem Solving: Decreased awareness of errors  Insights: Not aware of deficits  Initiation: Requires cues for all  Sequencing: Requires cues for all  Cognition Comment: pt confused with very poor STM this date           Plan   Plan  Times per week: 3-5x  Specific instructions for Next Treatment: cotx  Current Treatment Recommendations: Balance Training, Functional Mobility Training, Safety Education & Training, Cognitive Reorientation, Self-Care / ADL, Patient/Caregiver Education & Training, Home Management Training, Endurance Training    AM-PAC Score        AM-PAC Inpatient Daily Activity Raw Score: 11 (10/11/21 1518)  AM-PAC Inpatient ADL T-Scale Score : 29.04 (10/11/21 1518)  ADL Inpatient CMS 0-100% Score: 70.42 (10/11/21 1518)  ADL Inpatient CMS G-Code Modifier : CL (10/11/21 1518)    Goals  Short term goals  Time Frame for Short term goals: 1 week by 10/8  Short term goal 1: Pt will complete toileting with Mod A by 10/4-- GOAL NOT MET, dependent 10/11/21  Short term goal 2: Pt will complete LBD with Mod  A-- GOAL NOT MET, total  [id number]  Short term goal 3: Pt will complete grooming seated EOB with Min A-- GOAL NOT MET, NT 10/11/21  Short term goal 4: Pt will complete UBD seated EOB with Min A-- GOAL MOT MET, max 10/11/21  Long term goals  Time Frame for Long term goals : STGs=LTGs  Patient Goals   Patient goals : unable to state       Therapy Time   Individual Concurrent Group Co-treatment   Time In 1158         Time Out 1225         Minutes 27         Timed Code Treatment Minutes: 27 Minutes      If pt discharges prior to next session, this note will serve as discharge summary. See case management note for discharge disposition.      Anita Denise, OT

## 2021-10-11 NOTE — PROGRESS NOTES
Called patients son Fariba Mccann for consent for the [facility]. Dr Jean Marie Marsh spoke to the patients son and all questions were answered.  Phone consent done between this RN and Dr Jean Marie Marsh

## 2021-10-11 NOTE — PROGRESS NOTES
Physical Therapy  Facility/Department: [facility] B3 - MED SURG  Daily Treatment Note  NAME: Melinda Haynes  : 1945  MRN: [id number]    Date of Service: 10/11/2021    Discharge Recommendations:  Subacute/Skilled Nursing Facility        Assessment   Body structures, Functions, Activity limitations: Decreased functional mobility ; Decreased safe awareness;Decreased balance;Decreased endurance;Decreased strength;Decreased cognition;Decreased posture  Assessment: Pt was cotx with OT due to elevated level of assist required for mobility to maximize functioanl outcomes. Pt required increased level of assist today for mobility, very confused this date as well. Pt required Ivelisse for supine to sit, but mod A x2 for sit<>stand and for gait with RW x 25 ft. Pt required max A x2 for chair to bed transfer without AD. Recommend SNF at [place] from acute setting  Treatment Diagnosis: weakness, decreased mobility, decreased cognition  Specific instructions for Next Treatment: Progress ther ex and mobility as tolerated  Prognosis: Good  Decision Making: Medium Complexity  PT Education: Goals;PT Role;Plan of Care;General Safety; Disease Specific Education;Gait Training;Transfer Training;Orientation; Functional Mobility Training;Equipment;Precautions  Patient Education: Disease-Specific Education: pt educated in general safety and safe technique when ambulating with RW; pt demonstrates some understanding but will require lots of reinforcement 2* impaired cognition. Barriers to Learning: Confusion  REQUIRES PT FOLLOW UP: Yes  Activity Tolerance  Activity Tolerance: /58  HR 75  O2 92% RA     Patient Diagnosis(es): The primary encounter diagnosis was Acute renal failure superimposed on chronic kidney disease, unspecified CKD stage, unspecified acute renal failure type ([facility] [address]  Diagnoses of Urinary tract infection without hematuria, site unspecified and Altered mental status, unspecified altered mental status type were also pertinent to this visit. 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 has a past surgical history that includes Cardiac surgery (2006); Lung surgery (2006); Kidney stone surgery (1996); Eye surgery (Bilateral, 2003); and Colonoscopy. Restrictions  Restrictions/Precautions  Restrictions/Precautions: Up as Tolerated, General Precautions, Fall Risk  Position Activity Restriction  Other position/activity restrictions: [employer] monitor in room, pt is legally blind, CHEL roth  Subjective   General  Chart Reviewed: Yes  Response To Previous Treatment: Patient with no complaints from previous session. Family / Caregiver Present: No  Referring Practitioner: Suzie Howell DO  Subjective  Subjective: Pt agreeable to work with PT this morning, pleasantly confused  General Comment  Comments: Pt resting in bed upon entry of PT, RN cleared pt for PT today  Pain Screening  Patient Currently in Pain: Denies  Vital Signs  Patient Currently in Pain: Denies       Orientation  Orientation  Orientation Level: Oriented to person;Disoriented to time;Disoriented to place; Disoriented to situation       Objective   Bed mobility  Sit to Supine: Minimal assistance  Scooting: Minimal assistance (to scoot to EOB)  Transfers  Sit to Stand: Moderate Assistance;2 Person Assistance (up to walker)  Stand to sit: Moderate Assistance  Bed to Chair: Moderate assistance;2 Person Assistance (with walker, posterior lean)  Ambulation  Ambulation?: Yes  More Ambulation?: Yes  Ambulation 1  Surface: level tile  Device: [address]: Moderate assistance;2 Person assistance  Quality of Gait: Very short shuffling steps, required full guidance and negoaition of walker from therapist and for balance due to posterior lean.   very unsteady  Gait Deviations: Slow Brionna;Decreased step length;Decreased step height;Staggers; Deviated path  Distance: 25 ft  Ambulation 2  Surface - 2: level tile  Device 2: No device  Assistance 2: 2 Person assistance (HHA x 2 with max A x2)  Quality of Gait 2: very unsteady with posterior lean, short shuffling steps  Distance: 3 ft from chair back to bed  Comments: assisted pt back to bed as going for procedure very soon     Balance  Posture: Fair  Sitting - Static: Good;-  Sitting - Dynamic: Fair  Standing - Static: Fair;-  Standing - Dynamic: Poor;+  Exercises  Hip Flexion: x 15 B  Hip Abduction: 15 B  Knee Long Arc Quad: 15 B  Ankle Pumps: 15 B     AM-PAC Score  AM-PAC Inpatient Mobility Raw Score : 11 (10/11/21 1543)  AM-PAC Inpatient T-Scale Score : 33.86 (10/11/21 1543)  Mobility Inpatient CMS 0-100% Score: 72.57 (10/11/21 1543)  Mobility Inpatient CMS G-Code Modifier : CL (10/11/21 1543)          Goals  Short term goals  Time Frame for Short term goals: 1 week (10/7) unless otherwise specified - goal time frame extended x 1 week to 10/14/21 secondary to longer-than-expected LOS  Short term goal 1: pt to perform bed mobility with supervision; 10/11 min A  Short term goal 2: pt to perform transfers with CGA x 1; 10/11 mod A x 2 up to walker  Short term goal 3: pt to amb with least restrictive or no device x 50 feet with CGA x 1; 10/11 25 ft with RW with mod A x2  Short term goal 4: pt to participate in LE Ex 8-10 reps by 10/5 - MET 10/06/21; 10/11 continue  Patient Goals   Patient goals : Pt is confused, does not state goals when asked    Plan    Plan  Times per week: 3-5x/week in acute care  Times per day: Daily  Specific instructions for Next Treatment: Progress ther ex and mobility as tolerated  Current Treatment Recommendations: Strengthening, Transfer Training, Endurance Training, Cognitive Reorientation, Balance Training, Gait Training, Functional Mobility Training, Safety Education & Training, Neuromuscular Re-education, Home Exercise Program, Patient/Caregiver Education & Training, Positioning  Safety Devices  Type of devices: All fall risk precautions in place, Gait belt, Patient at risk for falls, Nurse notified, Call light within reach, Left in chair, Telesitter in use, Chair alarm in place     Therapy Time   Individual Concurrent Group Co-treatment   Time In 1159         Time Out 1225         Minutes 26         Timed Code Treatment Minutes: 26 Minutes    If pt is discharged prior to next therapy session, this note will serve as discharge summary.     Baron Burns, PT

## 2021-10-11 NOTE — SEDATION DOCUMENTATION
Image guided tunneled dialysis catheter completed. Dr. Anayeli Gregory placed a 14.5 23 cm Palindrome Chronic Tunneled Dialysis Catheter LOT [id number] EXP 1/17/2026 in the right IJ (chest and tunneling accessing the right IJ). Line aspirates and flushes with ease. Dialysis catheter secured with sutures. Surgical site dressing clean, dry, and intact. Each dialysis access lumen heparin locked with 1.9 ml of IV heparin each. Pt tolerated procedure without any signs or symptoms of distress. Vital signs stable. Report called to  RN. Pt transported back to [facility] in stable condition via bed by transport. Line ok to use per Shubham. Vital Signs  Vitals:    10/11/21 1545   BP: 121/61   Pulse: 65   Resp: 18   Temp:    SpO2: 100%        Post Francisco Score  2 - Able to move 4 extremities voluntarily on command  2 - BP+/- 20mmHg of normal  1 - Needs oxygen to maintain oxygen saturation >90%  2 - Able to breathe deeply and cough freely  1 - Arousable on calling    Total  IV Sedation  . 5 mg Versed  25 mcg Fentanyl

## 2021-10-11 NOTE — PROGRESS NOTES
Cr going up  Saw Dr Osbaldo Pinon note  Advanced renal failure  And fluid overload    Will start dialysis  NPO  Requested IR to place tunneled line  Will ask [facility] to do outpatient placement. Patient was agreeable for dialysis if needed, when I spoke to him on Friday.

## 2021-10-11 NOTE — SEDATION DOCUMENTATION
Pt arrived for image guided tunneled dialysis catheter insertion right IJ . Procedure explained including the risk and benefits of the procedure. All questions answered. Pt verbalizes understanding of the procedure and states no more questions. Consent confirmed. Vital signs stable. Labs, allergies, medications, and code status reviewed. No contraindications noted. Time out completed prior to procedure. Pt was place supine on the IR table. Pt was placed on all cardiac monitoring. Pt placed on 2 L NC for sedation.      Vital Signs  Vitals:    10/11/21 1526   BP: (!) 118/59   Pulse: 63   Resp: 18   Temp:    SpO2: 100%    (vital signs in table format)    Pre Francisco Score  2 - Able to move 4 extremities voluntarily on command  2 - BP+/- 20mmHg of normal  2 - Fully awake  2 - Able to maintain oxygen saturation >92% on room air  2 - Able to breathe deeply and cough freely    Allergies  Ace inhibitors and Morphine (allergies)    Labs  Lab Results   Component Value Date    INR 1.08 10/07/2021    PROTIME 12.3 10/07/2021     Lab Results   Component Value Date    CREATININE 4.3 (H) 10/11/2021    BUN 46 (H) 10/11/2021     10/11/2021    K 4.2 10/11/2021     10/11/2021    CO2 23 10/11/2021     Lab Results   Component Value Date    WBC 3.4 (L) 10/09/2021    HGB 8.4 (L) 10/09/2021    HCT 24.9 (L) 10/09/2021    MCV 96.7 10/09/2021    PLT 69 (L) 10/09/2021

## 2021-10-11 NOTE — CARE COORDINATION
Chart review- Pt from [address] Plan is to return when medically ready. Tunneled cath to be placed. HD to start. Dr. Jennifer Roberson will have [facility] to place for outpt HD. Zack called Radha at [facility]. Zack informed her that pt going to start HD. She is not sure at this time if they can accomodate. She will get back with CM with answer.

## 2021-10-11 NOTE — PROGRESS NOTES
[facility]    [facility] [place]              [phone]                         Interval History and plan:     His urine output is very low at 250  Off of fluids  Has Ryan  Mental status not improved      Plan:   He appears to be euvolemic  Likely ESRD  We will initiate dialysis         Asked Didierchris to arrange for outpatient dialysis      Acute Kidney Injury/ CKD 4- now ESRD  ADRIAN likely due to -prerenal etiology  Cr on consultation 3.3  Baseline Cr- 3  CKD likely due to diabetes hypertension recurrent ADRIAN  He was admitted to [facility] on 6/29- 7/8/21- with ADRIAN  Cr at DC was 3.38 from peak of 4.75(pre-renal)  UA- small blood, small nitrite, wbc 21-50  Renal Imaging:-10/21-right kidney 8.2 cm, left 8.3 cm, no hydronephrosis  Echo: 9/21-EF 15 to 51%, grade 2 diastolic dysfunction  8/19- nromal EF, RV pressure < 35 mmHg      Acute metabolic encephalopathy  f underlying dementia. Neuro assisted MRI  Brain-unable to obtain d/t sternal wires. Continue supportive care, avoid sedatives     UTI   . Completed treatment with IV ceftriaxone and IV Zyvox, d/obey on 10/4.     DMII        Cardiomyopathy   : Ef 15-20%     CAD   with elevated trop :     Medical management recommended per cardio. Continue BB, aspirin, statin     Anemia  : likely anemia of chronic disease due to CKD. on retacrit weekly       Urinary retention   flomax    intermittent straight cath. UTI   has been treated.     Urology   Ryan           Hypertension   BP: (125-140)/(63-73)  Pulse:  [71-85]   BP goal inpatient [phone] systolic inpatient    Anemia of CKD  Iron sat normal  Hb 9.5 on consult    Acidosis  Oral bicarb     BPH  History of renal stones  CAD  Carotid artery occlusion  Diabetes mellitus        [facility] would like to thank Tricia Butler MD   for opportunity to serve this patient      Please call with questions at-   24 Hrs Answering service [phone] or  7 am- 5 pm via Perfect serve or cell phone  Noreen Pinon MD          CC/reason for consult :     ADRIAN     HPI :     Francisca Moreno is a 78 y.o. male presented to   the hospital on 9/30/2021 with altered mental status from nursing facility. He is unable to provide details. Per reports he has had bright red blood per rectum. He was brought to the emergency room where work-up showed possible inferolateral ischemia on the heart and had QTc prolongation. CT scan of the head was done which was negative. He is scheduled to do MRI but has not done yet. He was found to have metabolic acidosis, ADRIAN. Also elevation of troponin. He had a UA done which showed possible infection, culture is pending also blood culture is sent and results are pending. He is currently on IV antibiotics. We are consulted for ADRIAN and related issues    ROS:     Positives Listed Bold. All other remaining systems are negative. Constitutional:  fever, chills, weakness, weight change, fatigue,      Skin:  rash, pruritus, hair loss, bruising, dry skin, petechiae. Head, Face, Neck   headaches, swelling,  cervical adenopathy. Respiratory: shortness of breath, cough, or wheezing  Cardiovascular: chest pain, palpitations, dizzy, edema  Gastrointestinal: nausea, vomiting, diarrhea, constipation,belly pain    Yellow skin, blood in stool  Musculoskeletal:  back pain, muscle weakness, gait problems,       joint pain or swelling. Genitourinary:  dysuria, poor urine flow, flank pain, blood in urine  Neurologic:  vertigo, TIA'S, syncope, seizures, focal weakness  Psychosocial:  insomnia, anxiety, or depression.   Additional positive findings: -         PMH/PSH/SH/Family History:     Past Medical History:   Diagnosis Date    CAD (coronary artery disease)     Chronic renal disease     Dr. 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 He has never used smokeless tobacco. He reports that he does not drink alcohol and does not use drugs. family history includes Diabetes in his mother.          Medication:     Current Facility-Administered Medications: ceFAZolin (ANCEF) 2000 mg in dextrose 5 % 100 mL IVPB, 2,000 mg, IntraVENous, Once  famotidine (PEPCID) tablet 10 mg, 10 mg, Oral, Daily  epoetin long-epbx (RETACRIT) injection 1,000 Units, 1,000 Units, SubCUTAneous, Weekly  carvedilol (COREG) tablet 18.75 mg, 18.75 mg, Oral, BID WC  sodium bicarbonate tablet 1,300 mg, 1,300 mg, Oral, TID  [Held by provider] hydrALAZINE (APRESOLINE) tablet 10 mg, 10 mg, Oral, 3 times per day  insulin lispro (HUMALOG) injection vial 0-6 Units, 0-6 Units, SubCUTAneous, TID WC  insulin lispro (HUMALOG) injection vial 0-3 Units, 0-3 Units, SubCUTAneous, Nightly  tamsulosin (FLOMAX) capsule 0.4 mg, 0.4 mg, Oral, Daily  isosorbide dinitrate (ISORDIL) tablet 10 mg, 10 mg, Oral, TID  [Held by provider] insulin glargine (LANTUS) injection vial 14 Units, 14 Units, SubCUTAneous, Nightly  atorvastatin (LIPITOR) tablet 20 mg, 20 mg, Oral, Daily  aspirin EC tablet 81 mg, 81 mg, Oral, Daily  sodium chloride flush 0.9 % injection 10 mL, 10 mL, IntraVENous, 2 times per day  sodium chloride flush 0.9 % injection 10 mL, 10 mL, IntraVENous, PRN  0.9 % sodium chloride infusion, 25 mL, IntraVENous, PRN  magnesium sulfate 2000 mg in 50 mL IVPB premix, 2,000 mg, IntraVENous, PRN  promethazine (PHENERGAN) tablet 12.5 mg, 12.5 mg, Oral, Q6H PRN **OR** ondansetron (ZOFRAN) injection 4 mg, 4 mg, IntraVENous, Q6H PRN  senna (SENOKOT) tablet 8.6 mg, 1 tablet, Oral, Daily PRN  perflutren lipid microspheres (DEFINITY) injection 1.65 mg, 1.5 mL, IntraVENous, ONCE PRN  glucose (GLUTOSE) 40 % oral gel 15 g, 15 g, Oral, PRN  dextrose 50 % IV solution, 12.5 g, IntraVENous, PRN  glucagon (rDNA) injection 1 mg, 1 mg, IntraMUSCular, PRN  dextrose 5 % solution, 100 mL/hr, IntraVENous, PRN  Vitamin D (CHOLECALCIFEROL) tablet 2,000 Units, 2,000 Units, Oral, Daily       Vitals :     Vitals:    10/11/21 1241   BP: 125/63   Pulse: 71   Resp: 18   Temp: 97.7 °F (36.5 °C)   SpO2: 92%       I & O :       Intake/Output Summary (Last 24 hours) at 10/11/2021 1438  Last data filed at 10/11/2021 0355  Gross per 24 hour   Intake 0 ml   Output 250 ml   Net -250 ml        Physical Examination :     General appearance: male in NAD, alert and awake. HEENT: EOM intact, no icterus. Trachea is midline. Neck : No mass, appears symmetrical, no JVD   Respiratory: Respiratory effort appears normal, no wheeze, no crackles  Cardiovascular: Ausculation- No M/R/G, no edema  Abdomen: No visible mass or tenderness  Musculoskeletal:  No clubbing,cyanosis, joints with no swelling or deformity. Skin:no rashes, ulcers, induration, no jaundice. Neuro: face symmetric, no focal deficits. Appropriate responses.  CN 2-12 grossly intact    Additional findings:-        LABS:     Recent Labs     10/09/21  0709   WBC 3.4*   HGB 8.4*   HCT 24.9*   PLT 69*     Recent Labs     10/09/21  0623 10/10/21  0658 10/11/21  0615    141 144   K 4.6 4.7 4.2    106 107   CO2 19* 24 23   BUN 38* 40* 46*   CREATININE 3.4* 3.5* 4.3*   GLUCOSE 140* 145* 122*

## 2021-10-11 NOTE — PROGRESS NOTES
Hospitalist Progress Note      PCP: Natalie Olivas    Date of Admission: 9/30/2021    Chief Complaint: [address] Course: H&P reviewed     Subjective: Ryan in place. Patient resting comfortably and denies any complaints. Cr up trending, plan for dialysis cath      Medications:  Reviewed    Infusion Medications    sodium chloride Stopped (10/02/21 0344)    dextrose       Scheduled Medications    ceFAZolin  2,000 mg IntraVENous Once    famotidine  10 mg Oral Daily    epoetin long-epbx  1,000 Units SubCUTAneous Weekly    carvedilol  18.75 mg Oral BID WC    sodium bicarbonate  1,300 mg Oral TID    [Held by provider] hydrALAZINE  10 mg Oral 3 times per day    insulin lispro  0-6 Units SubCUTAneous TID WC    insulin lispro  0-3 Units SubCUTAneous Nightly    tamsulosin  0.4 mg Oral Daily    isosorbide dinitrate  10 mg Oral TID    [Held by provider] insulin glargine  14 Units SubCUTAneous Nightly    atorvastatin  20 mg Oral Daily    aspirin  81 mg Oral Daily    sodium chloride flush  10 mL IntraVENous 2 times per day    Vitamin D  2,000 Units Oral Daily     PRN Meds: sodium chloride flush, sodium chloride, magnesium sulfate, promethazine **OR** ondansetron, senna, perflutren lipid microspheres, glucose, dextrose, glucagon (rDNA), dextrose      Intake/Output Summary (Last 24 hours) at 10/11/2021 1330  Last data filed at 10/11/2021 0355  Gross per 24 hour   Intake 100 ml   Output 250 ml   Net -150 ml       Physical Exam Performed:    /63   Pulse 71   Temp 97.7 °F (36.5 °C) (Oral)   Resp 18   Ht 5' 8\" (1.727 m)   Wt 139 lb 9.6 oz (63.3 kg)   SpO2 92%   BMI 21.23 kg/m²      General appearance: No apparent distress, appears stated age and cooperative. Legally blind  HEENT: Pupils equal, round, Conjunctivae/corneas clear. Neck: Supple, with full range of motion. No jugular venous distention. Trachea midline. Respiratory:  Normal respiratory effort.  Clear to auscultation, on chronic kidney disease ([facility] [address] [N17.9, N18.9]     Acute UTI [N39.0]     Acute encephalopathy [G93.40]     DM (diabetes mellitus), type 2 ([facility] [address] [E11.9]     Hypertension [I10]     Hyperlipidemia [E78.5]     CAD (coronary artery disease) [I25.10]      BRBPR with anemia: no acute GIB noted inpatient with no need for endoscopy at this time  per GI. Continue to follow clinically for any reoccurrence. hgb stable    Acute metabolic encephalopathy: likely due to UTI, worsening of underlying dementia. Neuro assisted MRI  Brain-unable to obtain d/t sternal wires. Continue supportive care, avoid sedatives    ADRIAN on CKD : likely prerenal , had improved with IVF. nephro assisting. Cr uptrending . nephro assisting. Started on IVF. Plan to start dialysis. Monitor and avoid nephrotoxins    UTI with possible acute mastoditis:UC grew skin/urogenital rick. Completed treatment with IV ceftriaxone and IV Zyvox, d/obey on 10/4. DMII with hypoglycemia: Lantus held with no further hypoglycemia. Continue SSI . Monitor and adjust insulin doses as needed    Cardiomyopathy : Ef 15-20% on echo. continue beta-blocker, hydralazine and nitrate combination. No ACE/ARB/ARNI/aldosterone antagonist due to renal insufficiency. CAD with elevated trop : cardio assisting . Stress test abnormal-EF 32%, large scar with no ischemia. Medical management recommended per cardio. Continue BB, aspirin, statin    Anemia: likely anemia of chronic disease due to CKD. on retacrit weekly      Urinary retention: persistent despite getting flomax and getting intermittent straight cath. UTI has been treated. Urology recs appreciated.   Continue Ryan    DVT Prophylaxis: scds  Diet: Diet NPO  Code Status: Full Code    PT/OT Eval Status: consulted    Dispo - likely when renal function stabilizes to SNF     Ernts Nixon, DANGELO - CNP

## 2021-10-11 NOTE — FLOWSHEET NOTE
Treatment time: 1 hour  Net UF: 1L    Pre weight: 62.7kg  Post weight: 62.0kg  EDW: TBD    Access used: right chest catheter  Access function: good    Medications or blood products given: n/a    Regular outpatient schedule: ADRIAN    Summary of response to treatment:        10/11/21 1731 10/11/21 1832   Treatment   Time On 1731  --    Time Off  --  1832   Vital Signs   /74 (!) 122/58   Temp 97.6 °F (36.4 °C) 98.1 °F (36.7 °C)   Pulse 71 68   Resp 18 18   Weight 138 lb 3.7 oz (62.7 kg) 136 lb 11 oz (62 kg)   Post-Hemodialysis Assessment   Hemodialysis Intake (ml)  --  400 ml   Hemodialysis Output (ml)  --  1400 ml   NET Removed (ml)  --  1000 ml       Copy of dialysis treatment record placed in chart, to be scanned into EMR.     Electronically signed by Teodoro Parker RN on 10/11/2021 at 6:37 PM

## 2021-10-12 LAB
ANION GAP SERPL CALCULATED.3IONS-SCNC: 14 MMOL/L (ref 3–16)
BUN BLDV-MCNC: 34 MG/DL (ref 7–20)
CALCIUM SERPL-MCNC: 8.4 MG/DL (ref 8.3–10.6)
CHLORIDE BLD-SCNC: 107 MMOL/L (ref 99–110)
CO2: 23 MMOL/L (ref 21–32)
CREAT SERPL-MCNC: 3.6 MG/DL (ref 0.8–1.3)
GFR AFRICAN AMERICAN: 20
GFR NON-AFRICAN AMERICAN: 17
GLUCOSE BLD-MCNC: 105 MG/DL (ref 70–99)
GLUCOSE BLD-MCNC: 108 MG/DL (ref 70–99)
GLUCOSE BLD-MCNC: 162 MG/DL (ref 70–99)
GLUCOSE BLD-MCNC: 185 MG/DL (ref 70–99)
GLUCOSE BLD-MCNC: 188 MG/DL (ref 70–99)
GLUCOSE BLD-MCNC: 97 MG/DL (ref 70–99)
PERFORMED ON: ABNORMAL
PERFORMED ON: NORMAL
POTASSIUM SERPL-SCNC: 4.5 MMOL/L (ref 3.5–5.1)
SODIUM BLD-SCNC: 144 MMOL/L (ref 136–145)

## 2021-10-12 PROCEDURE — 6370000000 HC RX 637 (ALT 250 FOR IP): Performed by: INTERNAL MEDICINE

## 2021-10-12 PROCEDURE — 90935 HEMODIALYSIS ONE EVALUATION: CPT

## 2021-10-12 PROCEDURE — 6370000000 HC RX 637 (ALT 250 FOR IP): Performed by: HOSPITALIST

## 2021-10-12 PROCEDURE — 2580000003 HC RX 258: Performed by: HOSPITALIST

## 2021-10-12 PROCEDURE — 1200000000 HC SEMI PRIVATE

## 2021-10-12 PROCEDURE — 6370000000 HC RX 637 (ALT 250 FOR IP): Performed by: NURSE PRACTITIONER

## 2021-10-12 PROCEDURE — 36415 COLL VENOUS BLD VENIPUNCTURE: CPT

## 2021-10-12 PROCEDURE — 80048 BASIC METABOLIC PNL TOTAL CA: CPT

## 2021-10-12 RX ADMIN — CARVEDILOL 18.75 MG: 6.25 TABLET, FILM COATED ORAL at 12:41

## 2021-10-12 RX ADMIN — SODIUM CHLORIDE, PRESERVATIVE FREE 10 ML: 5 INJECTION INTRAVENOUS at 12:42

## 2021-10-12 RX ADMIN — SODIUM BICARBONATE 650 MG TABLET 1300 MG: at 15:39

## 2021-10-12 RX ADMIN — Medication 2000 UNITS: at 12:41

## 2021-10-12 RX ADMIN — INSULIN LISPRO 1 UNITS: 100 INJECTION, SOLUTION INTRAVENOUS; SUBCUTANEOUS at 18:22

## 2021-10-12 RX ADMIN — SODIUM BICARBONATE 650 MG TABLET 1300 MG: at 22:13

## 2021-10-12 RX ADMIN — ISOSORBIDE DINITRATE 10 MG: 10 TABLET ORAL at 22:13

## 2021-10-12 RX ADMIN — ATORVASTATIN CALCIUM 20 MG: 10 TABLET, FILM COATED ORAL at 12:42

## 2021-10-12 RX ADMIN — SODIUM CHLORIDE, PRESERVATIVE FREE 10 ML: 5 INJECTION INTRAVENOUS at 22:13

## 2021-10-12 RX ADMIN — TAMSULOSIN HYDROCHLORIDE 0.4 MG: 0.4 CAPSULE ORAL at 12:41

## 2021-10-12 RX ADMIN — ISOSORBIDE DINITRATE 10 MG: 10 TABLET ORAL at 12:42

## 2021-10-12 RX ADMIN — FAMOTIDINE 10 MG: 20 TABLET ORAL at 12:49

## 2021-10-12 RX ADMIN — INSULIN LISPRO 1 UNITS: 100 INJECTION, SOLUTION INTRAVENOUS; SUBCUTANEOUS at 22:14

## 2021-10-12 RX ADMIN — SODIUM BICARBONATE 650 MG TABLET 1300 MG: at 12:41

## 2021-10-12 RX ADMIN — ISOSORBIDE DINITRATE 10 MG: 10 TABLET ORAL at 15:39

## 2021-10-12 RX ADMIN — ASPIRIN 81 MG: 81 TABLET, COATED ORAL at 12:49

## 2021-10-12 ASSESSMENT — PAIN SCALES - GENERAL
PAINLEVEL_OUTOF10: 0
PAINLEVEL_OUTOF10: 0

## 2021-10-12 NOTE — PROGRESS NOTES
ARIADNE GUTIERREZ NEPHROLOGY    [facility] [employer]              [phone]                         Interval History and plan:     Urine output continues to be low  On dialysis      Plan:   Continue dialysis  Tomorrow will be longer duration  Mental status not much better  Hopefully with longer dialysis tomorrow mental status will get better    Acute Kidney Injury/ CKD 4- now ESRD  ADRIAN likely due to -prerenal etiology  Cr on consultation 3.3  Baseline Cr- 3  CKD likely due to diabetes hypertension recurrent ADRIAN  He was admitted to [facility] on 6/29- 7/8/21- with ADRIAN  Cr at DC was 3.38 from peak of 4.75(pre-renal)  UA- small blood, small nitrite, wbc 21-50  Renal Imaging:-10/21-right kidney 8.2 cm, left 8.3 cm, no hydronephrosis  Echo: 9/21-EF 15 to 10%, grade 2 diastolic dysfunction  0/71- nromal EF, RV pressure < 35 mmHg      Acute metabolic encephalopathy  f underlying dementia. Neuro assisted MRI  Brain-unable to obtain d/t sternal wires. Continue supportive care, avoid sedatives     UTI   . Completed treatment with IV ceftriaxone and IV Zyvox, d/obey on 10/4.     DMII        Cardiomyopathy   : Ef 15-20%     CAD   with elevated trop :     Medical management recommended per cardio. Continue BB, aspirin, statin     Anemia  : likely anemia of chronic disease due to CKD. on retacrit weekly       Urinary retention   flomax    intermittent straight cath. UTI   has been treated.     Urology   Ryan           Hypertension   BP: (109-131)/(55-67)  Pulse:  [76-80]   BP goal inpatient [id number] systolic inpatient    Anemia of CKD  Iron sat normal  Hb 9.5 on consult    Acidosis  Oral bicarb     BPH  History of renal stones  CAD  Carotid artery occlusion  Diabetes mellitus        [facility] would like to thank Agnieszka Black MD   for opportunity to serve this patient      Please call with questions at-   24 Hrs Answering service [phone] or  7 am- 5 pm via Perfect serve or cell uncontrolled        Past Surgical History:   Procedure Laterality Date    CARDIAC SURGERY  2006    quad by pass    COLONOSCOPY      pt refuses to have one    EYE SURGERY Bilateral 2003    IR TUNNELED CATHETER PLACEMENT GREATER THAN 5 YEARS  10/11/2021    IR TUNNELED CATHETER PLACEMENT GREATER THAN 5 YEARS 10/11/2021 [facility] SPECIAL PROCEDURES    KIDNEY STONE SURGERY  1996    LUNG SURGERY  2006    scraped lungs        reports that he has never smoked. He has never used smokeless tobacco. He reports that he does not drink alcohol and does not use drugs. family history includes Diabetes in his mother.          Medication:     Current Facility-Administered Medications: famotidine (PEPCID) tablet 10 mg, 10 mg, Oral, Daily  epoetin long-epbx (RETACRIT) injection 1,000 Units, 1,000 Units, SubCUTAneous, Weekly  carvedilol (COREG) tablet 18.75 mg, 18.75 mg, Oral, BID WC  sodium bicarbonate tablet 1,300 mg, 1,300 mg, Oral, TID  [Held by provider] hydrALAZINE (APRESOLINE) tablet 10 mg, 10 mg, Oral, 3 times per day  insulin lispro (HUMALOG) injection vial 0-6 Units, 0-6 Units, SubCUTAneous, TID WC  insulin lispro (HUMALOG) injection vial 0-3 Units, 0-3 Units, SubCUTAneous, Nightly  tamsulosin (FLOMAX) capsule 0.4 mg, 0.4 mg, Oral, Daily  isosorbide dinitrate (ISORDIL) tablet 10 mg, 10 mg, Oral, TID  [Held by provider] insulin glargine (LANTUS) injection vial 14 Units, 14 Units, SubCUTAneous, Nightly  atorvastatin (LIPITOR) tablet 20 mg, 20 mg, Oral, Daily  aspirin EC tablet 81 mg, 81 mg, Oral, Daily  sodium chloride flush 0.9 % injection 10 mL, 10 mL, IntraVENous, 2 times per day  sodium chloride flush 0.9 % injection 10 mL, 10 mL, IntraVENous, PRN  0.9 % sodium chloride infusion, 25 mL, IntraVENous, PRN  magnesium sulfate 2000 mg in 50 mL IVPB premix, 2,000 mg, IntraVENous, PRN  promethazine (PHENERGAN) tablet 12.5 mg, 12.5 mg, Oral, Q6H PRN **OR** ondansetron (ZOFRAN) injection 4 mg, 4 mg, IntraVENous, Q6H PRN  senna (SENOKOT) tablet 8.6 mg, 1 tablet, Oral, Daily PRN  perflutren lipid microspheres (DEFINITY) injection 1.65 mg, 1.5 mL, IntraVENous, ONCE PRN  glucose (GLUTOSE) 40 % oral gel 15 g, 15 g, Oral, PRN  dextrose 50 % IV solution, 12.5 g, IntraVENous, PRN  glucagon (rDNA) injection 1 mg, 1 mg, IntraMUSCular, PRN  dextrose 5 % solution, 100 mL/hr, IntraVENous, PRN  Vitamin D (CHOLECALCIFEROL) tablet 2,000 Units, 2,000 Units, Oral, Daily       Vitals :     Vitals:    10/12/21 1103   BP: (!) 130/59   Pulse: 77   Resp: 16   Temp: 97.8 °F (36.6 °C)   SpO2: 98%       I & O :       Intake/Output Summary (Last 24 hours) at 10/12/2021 1122  Last data filed at 10/12/2021 1010  Gross per 24 hour   Intake 800 ml   Output 3550 ml   Net -2750 ml        Physical Examination :     General appearance: male in NAD, alert and awake. HEENT: EOM intact, no icterus. Trachea is midline. Neck : No mass, appears symmetrical, no JVD   Respiratory: Respiratory effort appears normal, no wheeze, no crackles  Cardiovascular: Ausculation- No M/R/G, no edema  Abdomen: No visible mass or tenderness  Musculoskeletal:  No clubbing,cyanosis, joints with no swelling or deformity. Skin:no rashes, ulcers, induration, no jaundice. Neuro: face symmetric, no focal deficits. Appropriate responses. CN 2-12 grossly intact    Additional findings:-        LABS:     No results for input(s): WBC, HGB, HCT, PLT in the last 72 hours.   Recent Labs     10/10/21  0658 10/11/21  0615 10/12/21  0701    144 144   K 4.7 4.2 4.5    107 107   CO2 24 23 23   BUN 40* 46* 34*   CREATININE 3.5* 4.3* 3.6*   GLUCOSE 145* 122* 105*

## 2021-10-12 NOTE — PROGRESS NOTES
Physical Therapy    Attempted PT treatment session. Pt off floor at this time. Will re-attempt at later time/date as schedule allows and pt medically appropriate.     Eleanor Pace, PT

## 2021-10-12 NOTE — PROGRESS NOTES
Hospitalist Progress Note      PCP: Yany Silva    Date of Admission: 9/30/2021    Chief Complaint: [address] Course: H&P reviewed     Subjective: Ryan in place. Patient resting comfortably and denies any complaints. HD per nephro. Family updated on status. Medications:  Reviewed    Infusion Medications    sodium chloride Stopped (10/02/21 0344)    dextrose       Scheduled Medications    famotidine  10 mg Oral Daily    epoetin long-epbx  1,000 Units SubCUTAneous Weekly    carvedilol  18.75 mg Oral BID WC    sodium bicarbonate  1,300 mg Oral TID    [Held by provider] hydrALAZINE  10 mg Oral 3 times per day    insulin lispro  0-6 Units SubCUTAneous TID WC    insulin lispro  0-3 Units SubCUTAneous Nightly    tamsulosin  0.4 mg Oral Daily    isosorbide dinitrate  10 mg Oral TID    [Held by provider] insulin glargine  14 Units SubCUTAneous Nightly    atorvastatin  20 mg Oral Daily    aspirin  81 mg Oral Daily    sodium chloride flush  10 mL IntraVENous 2 times per day    Vitamin D  2,000 Units Oral Daily     PRN Meds: sodium chloride flush, sodium chloride, magnesium sulfate, promethazine **OR** ondansetron, senna, perflutren lipid microspheres, glucose, dextrose, glucagon (rDNA), dextrose      Intake/Output Summary (Last 24 hours) at 10/12/2021 1518  Last data filed at 10/12/2021 1010  Gross per 24 hour   Intake 800 ml   Output 3550 ml   Net -2750 ml       Physical Exam Performed:    BP (!) 130/59   Pulse 77   Temp 97.8 °F (36.6 °C) (Oral)   Resp 16   Ht 5' 8\" (1.727 m)   Wt 134 lb 7.7 oz (61 kg)   SpO2 98%   BMI 20.45 kg/m²      General appearance: No apparent distress, appears stated age and cooperative. Legally blind  HEENT: Pupils equal, round, Conjunctivae/corneas clear. Neck: Supple, with full range of motion. No jugular venous distention. Trachea midline. Respiratory:  Normal respiratory effort.  Clear to auscultation, bilaterally without Rales/Wheezes/Rhonchi. Cardiovascular: Regular rate and rhythm with normal S1/S2 without murmurs, rubs or gallops. Abdomen: Soft, non-tender, non-distended with normal bowel sounds. Musculoskeletal: No overt significant bilateral edema  Skin:  Warm and dry  Neurologic:  Legally blind otherwise Neurovascularly intact without any focal sensory/motor deficits. Cranial nerves: II-XII intact, grossly non-focal.  Psychiatric: Alert and oriented,  limited insight       Labs:   No results for input(s): WBC, HGB, HCT, PLT in the last 72 hours. Recent Labs     10/10/21  0658 10/11/21  0615 10/12/21  0701    144 144   K 4.7 4.2 4.5    107 107   CO2 24 23 23   BUN 40* 46* 34*   CREATININE 3.5* 4.3* 3.6*   CALCIUM 8.5 8.3 8.4     No results for input(s): AST, ALT, BILIDIR, BILITOT, ALKPHOS in the last 72 hours. No results for input(s): INR in the last 72 hours. No results for input(s): Pina Horn in the last 72 hours. Urinalysis:      Lab Results   Component Value Date    NITRU Negative 09/30/2021    WBCUA 21-50 09/30/2021    BACTERIA 3+ 09/30/2021    RBCUA 5-10 09/30/2021    BLOODU SMALL 09/30/2021    SPECGRAV 1.015 09/30/2021    GLUCOSEU Negative 09/30/2021       Radiology:  IR TUNNELED CVC PLACE WO SQ PORT/PUMP > 5 YEARS   Final Result   Successful ultrasound and fluoroscopy guided tunneled catheter placement with   catheter tip in the right atrium. NM Cardiac Stress Test Nuclear Imaging   Final Result      VL Extremity Venous Left   Final Result      XR CHEST PORTABLE   Final Result   1. Congestive heart failure. US RETROPERITONEAL COMPLETE   Final Result   Grossly unremarkable ultrasound of the kidneys and urinary bladder. No   hydronephrosis. CT HEAD WO CONTRAST   Final Result   No acute intracranial abnormality.       Small right mastoid effusion                 Assessment/Plan:    Active Hospital Problems    Diagnosis     Elevated troponin [R77.8]     Cardiomyopathy ([facility] [I42.9]     Normocytic anemia [D64.9]     Acute renal failure superimposed on chronic kidney disease ([facility] [N17.9, N18.9]     Acute UTI [N39.0]     Acute encephalopathy [G93.40]     DM (diabetes mellitus), type 2 ([facility] [E11.9]     Hypertension [I10]     Hyperlipidemia [E78.5]     CAD (coronary artery disease) [I25.10]      BRBPR with anemia: no acute GIB noted inpatient with no need for endoscopy at this time  per GI. Continue to follow clinically for any reoccurrence. hgb stable    Acute metabolic encephalopathy: likely due to UTI, worsening of underlying dementia. Neuro assisted MRI  Brain-unable to obtain d/t sternal wires. Continue supportive care, avoid sedatives    ADRIAN on CKD : likely prerenal , had improved with IVF. nephro assisting. Cr uptrending . nephro assisting. Started on IVF. HD per nephro. Monitor and avoid nephrotoxins    UTI with possible acute mastoditis:UC grew skin/urogenital rick. Completed treatment with IV ceftriaxone and IV Zyvox, d/obey on 10/4. DMII with hypoglycemia: Lantus held with no further hypoglycemia. Continue SSI . Monitor and adjust insulin doses as needed    Cardiomyopathy : Ef 15-20% on echo. continue beta-blocker, hydralazine and nitrate combination. No ACE/ARB/ARNI/aldosterone antagonist due to renal insufficiency. CAD with elevated trop : cardio assisting . Stress test abnormal-EF 32%, large scar with no ischemia. Medical management recommended per cardio. Continue BB, aspirin, statin    Anemia: likely anemia of chronic disease due to CKD. on retacrit weekly      Urinary retention: persistent despite getting flomax and getting intermittent straight cath. UTI has been treated. Urology recs appreciated. Continue Ryan    DVT Prophylaxis: scds  Diet: ADULT DIET; Regular; 4 carb choices (60 gm/meal)  Adult Oral Nutrition Supplement;  Renal Oral Supplement  Code Status: Full Code    PT/OT Eval Status: consulted    Dispo - likely when renal function stabilizes to [facility]     Meagan Neri, APRN - CNP

## 2021-10-12 NOTE — CARE COORDINATION
CM called and spoke with patient's son, Vera Martínez, to further discuss discharge plan. Son is agreeable to assisting with transport IF patient cannot be admitted to a facility that offers [address] [facility] and [facility]. Per Deidra Lora at [facility], onsite HD is FULL at facility and they would not be able to accommodate patient. Referral called and faxed to central admissions for [facility]. No answer.  left for call back. In addition to alternate facility choices, CM discussed potential hospice referral for patient, as son voiced concerns of patient's quality of life with long term HD. Son states previously used [facility] for his mother and was happy with the agency. Referral to be made to [facility] today. 2:56 PM  CM called referral to [address]. Face sheet faxed. 3:20 PM  Call back received from Tatiana Green with [facility]. States meeting with patient's son is scheduled for tomorrow at 0.

## 2021-10-12 NOTE — PLAN OF CARE
Problem: Falls - Risk of:  Goal: Will remain free from falls  Description: Will remain free from falls  Outcome: Ongoing     Problem: Discharge Planning:  Goal: Discharged to appropriate level of care  Description: Discharged to appropriate level of care  Outcome: Ongoing     Problem: Serum Glucose Level - Abnormal:  Goal: Ability to maintain appropriate glucose levels will improve  Description: Ability to maintain appropriate glucose levels will improve  Outcome: Ongoing     Problem: Skin Integrity:  Goal: Risk for impaired skin integrity will decrease  Description: Risk for impaired skin integrity will decrease  Outcome: Ongoing     Problem: Tissue Perfusion:  Goal: Adequacy of tissue perfusion will improve  Description: Adequacy of tissue perfusion will improve  Outcome: Ongoing

## 2021-10-12 NOTE — FLOWSHEET NOTE
Treatment time: 2 hours  Net UF: 1500 ml     Pre weight: 62.5 kg   Post weight: 61 kg  EDW: TBD kg     Access used: RIJ TDC  Access function: Good with  ml/min     Medications or blood products given: Heparin for catheter dwells     Regular outpatient schedule: Acute     Summary of response to treatment:      10/12/21 0807 10/12/21 1010   Vital Signs   /63 (!) 122/59   Temp 97.8 °F (36.6 °C) 97.5 °F (36.4 °C)   Pulse 80 76   Resp 18 18   Weight 137 lb 12.6 oz (62.5 kg) 134 lb 7.7 oz (61 kg)   Percent Weight Change 0.65 -2.4   Pain Assessment   Pain Assessment  --  Faces   Pain Level  --  0   Post-Hemodialysis Assessment   Post-Treatment Procedures  --  Blood returned;Catheter capped, clamped and heparinized x 2 ports   Machine Disinfection Process  --  Acid/Vinegar Clean;Heat Disinfect; Exterior Machine Disinfection   Rinseback Volume (ml)  --  400 ml   Total Liters Processed (l/min)  --  40.6 l/min   Dialyzer Clearance  --  Lightly streaked   Duration of Treatment (minutes)  --  123 minutes   Heparin amount administered during treatment (units)  --  0 units   Hemodialysis Intake (ml)  --  400 ml   Hemodialysis Output (ml)  --  1900 ml   NET Removed (ml)  --  1500 ml   Tolerated Treatment  --  Good   Copy of dialysis treatment record placed in chart, to be scanned into EMR.  Report called to Genaro Swenson RN.

## 2021-10-12 NOTE — PROGRESS NOTES
Thank you for the opportunity to evaluate this patient and meet with patient and family to discuss hospice philosophy and services. Meeting time: tomorrow 10/13 at [address]. Moises Leahy RN, [address], [address] [place] [address]   O: [phone]  C: [phone]  F: [phone]   Main & Referrals: [phone]   [facility] org [facility]:

## 2021-10-13 ENCOUNTER — APPOINTMENT (OUTPATIENT)
Dept: INTERVENTIONAL RADIOLOGY/VASCULAR | Age: 76
DRG: 673 | End: 2021-10-13
Payer: MEDICARE

## 2021-10-13 LAB
ANION GAP SERPL CALCULATED.3IONS-SCNC: 11 MMOL/L (ref 3–16)
BASOPHILS ABSOLUTE: 0 K/UL (ref 0–0.2)
BASOPHILS RELATIVE PERCENT: 0.6 %
BUN BLDV-MCNC: 20 MG/DL (ref 7–20)
CALCIUM SERPL-MCNC: 8.3 MG/DL (ref 8.3–10.6)
CHLORIDE BLD-SCNC: 101 MMOL/L (ref 99–110)
CO2: 25 MMOL/L (ref 21–32)
CREAT SERPL-MCNC: 2.9 MG/DL (ref 0.8–1.3)
EOSINOPHILS ABSOLUTE: 0.1 K/UL (ref 0–0.6)
EOSINOPHILS RELATIVE PERCENT: 2.4 %
GFR AFRICAN AMERICAN: 26
GFR NON-AFRICAN AMERICAN: 21
GLUCOSE BLD-MCNC: 136 MG/DL (ref 70–99)
GLUCOSE BLD-MCNC: 144 MG/DL (ref 70–99)
GLUCOSE BLD-MCNC: 178 MG/DL (ref 70–99)
GLUCOSE BLD-MCNC: 198 MG/DL (ref 70–99)
GLUCOSE BLD-MCNC: 202 MG/DL (ref 70–99)
HCT VFR BLD CALC: 23.1 % (ref 40.5–52.5)
HEMOGLOBIN: 7.8 G/DL (ref 13.5–17.5)
LYMPHOCYTES ABSOLUTE: 0.5 K/UL (ref 1–5.1)
LYMPHOCYTES RELATIVE PERCENT: 9.1 %
MCH RBC QN AUTO: 32 PG (ref 26–34)
MCHC RBC AUTO-ENTMCNC: 33.6 G/DL (ref 31–36)
MCV RBC AUTO: 95.4 FL (ref 80–100)
MONOCYTES ABSOLUTE: 0.8 K/UL (ref 0–1.3)
MONOCYTES RELATIVE PERCENT: 14.5 %
NEUTROPHILS ABSOLUTE: 3.8 K/UL (ref 1.7–7.7)
NEUTROPHILS RELATIVE PERCENT: 73.4 %
PDW BLD-RTO: 15.2 % (ref 12.4–15.4)
PERFORMED ON: ABNORMAL
PLATELET # BLD: 50 K/UL (ref 135–450)
PMV BLD AUTO: 9.6 FL (ref 5–10.5)
POTASSIUM SERPL-SCNC: 4.3 MMOL/L (ref 3.5–5.1)
RBC # BLD: 2.42 M/UL (ref 4.2–5.9)
SODIUM BLD-SCNC: 137 MMOL/L (ref 136–145)
WBC # BLD: 5.2 K/UL (ref 4–11)

## 2021-10-13 PROCEDURE — 6370000000 HC RX 637 (ALT 250 FOR IP): Performed by: INTERNAL MEDICINE

## 2021-10-13 PROCEDURE — 90935 HEMODIALYSIS ONE EVALUATION: CPT

## 2021-10-13 PROCEDURE — 6370000000 HC RX 637 (ALT 250 FOR IP): Performed by: NURSE PRACTITIONER

## 2021-10-13 PROCEDURE — 6370000000 HC RX 637 (ALT 250 FOR IP): Performed by: HOSPITALIST

## 2021-10-13 PROCEDURE — 1200000000 HC SEMI PRIVATE

## 2021-10-13 PROCEDURE — 2580000003 HC RX 258: Performed by: HOSPITALIST

## 2021-10-13 PROCEDURE — 6360000002 HC RX W HCPCS: Performed by: INTERNAL MEDICINE

## 2021-10-13 PROCEDURE — 36598 INJ W/FLUOR EVAL CV DEVICE: CPT

## 2021-10-13 PROCEDURE — 36415 COLL VENOUS BLD VENIPUNCTURE: CPT

## 2021-10-13 PROCEDURE — 6360000004 HC RX CONTRAST MEDICATION: Performed by: INTERNAL MEDICINE

## 2021-10-13 PROCEDURE — 80048 BASIC METABOLIC PNL TOTAL CA: CPT

## 2021-10-13 PROCEDURE — 85025 COMPLETE CBC W/AUTO DIFF WBC: CPT

## 2021-10-13 RX ORDER — HEPARIN SODIUM 1000 [USP'U]/ML
INJECTION, SOLUTION INTRAVENOUS; SUBCUTANEOUS
Status: DISPENSED
Start: 2021-10-13 | End: 2021-10-13

## 2021-10-13 RX ADMIN — INSULIN LISPRO 1 UNITS: 100 INJECTION, SOLUTION INTRAVENOUS; SUBCUTANEOUS at 22:43

## 2021-10-13 RX ADMIN — ASPIRIN 81 MG: 81 TABLET, COATED ORAL at 08:26

## 2021-10-13 RX ADMIN — ISOSORBIDE DINITRATE 10 MG: 10 TABLET ORAL at 08:26

## 2021-10-13 RX ADMIN — INSULIN LISPRO 1 UNITS: 100 INJECTION, SOLUTION INTRAVENOUS; SUBCUTANEOUS at 17:55

## 2021-10-13 RX ADMIN — Medication 2000 UNITS: at 08:26

## 2021-10-13 RX ADMIN — SODIUM BICARBONATE 650 MG TABLET 1300 MG: at 08:26

## 2021-10-13 RX ADMIN — TAMSULOSIN HYDROCHLORIDE 0.4 MG: 0.4 CAPSULE ORAL at 08:27

## 2021-10-13 RX ADMIN — FAMOTIDINE 10 MG: 20 TABLET ORAL at 08:26

## 2021-10-13 RX ADMIN — CARVEDILOL 18.75 MG: 6.25 TABLET, FILM COATED ORAL at 17:54

## 2021-10-13 RX ADMIN — ATORVASTATIN CALCIUM 20 MG: 10 TABLET, FILM COATED ORAL at 08:27

## 2021-10-13 RX ADMIN — SODIUM BICARBONATE 650 MG TABLET 1300 MG: at 22:44

## 2021-10-13 RX ADMIN — SODIUM CHLORIDE, PRESERVATIVE FREE 10 ML: 5 INJECTION INTRAVENOUS at 08:36

## 2021-10-13 RX ADMIN — EPOETIN ALFA-EPBX 1000 UNITS: 2000 INJECTION, SOLUTION INTRAVENOUS; SUBCUTANEOUS at 11:53

## 2021-10-13 RX ADMIN — ISOSORBIDE DINITRATE 10 MG: 10 TABLET ORAL at 22:44

## 2021-10-13 RX ADMIN — ISOSORBIDE DINITRATE 10 MG: 10 TABLET ORAL at 15:50

## 2021-10-13 RX ADMIN — IOVERSOL 8 ML: 741 INJECTION INTRA-ARTERIAL; INTRAVENOUS at 15:39

## 2021-10-13 RX ADMIN — SODIUM CHLORIDE, PRESERVATIVE FREE 10 ML: 5 INJECTION INTRAVENOUS at 22:54

## 2021-10-13 RX ADMIN — SODIUM BICARBONATE 650 MG TABLET 1300 MG: at 15:50

## 2021-10-13 ASSESSMENT — PAIN SCALES - GENERAL
PAINLEVEL_OUTOF10: 0
PAINLEVEL_OUTOF10: 0

## 2021-10-13 NOTE — PROGRESS NOTES
Met with patient's son Boris Gutierrez and grandson Savanna Kim. Son Priyanka Mccormick on speaker phone as well. Discussed hospice philosophy and services. While they all would like hospice services for patient, they would like to try dialysis short term to see how patient tolerates it first.  Did check with [address] [place] [address] medical director to see if [address] [place] [address] could admit with heart failure with EF 15-20% and patient could have dialysis for the renal failure unrelated. Unfortunately these are so intertwined that this will not be possible. Family and writer had a long discussion about how things work in the outpatient world. Verbalized my many concerns about patient tolerating dialysis as patient has not done well here, he has already pulled dialysis cath, the to/from dialysis three times weekly would take it's toll on already frail man. Ultimately that it is not likely to make his QOL better but worse. Family does have the same concerns as well. Counseled them that if they are continuing dialysis they needed to set a time and reconsider how things are going again. At time time the plan is for dialysis. HOC to follow peripherally. Updated JUDIT Sears. Jayashree Bruner RN, [address], [address] [place] [address]   O: [phone]  C: [phone]  F: [phone]   Main & Referrals: [phone]   [facility]. [facility]

## 2021-10-13 NOTE — PROGRESS NOTES
Occupational Therapy  Chart reviewed. Treatment attempted, but pt off floor at HD. Will re-attempt as able.  Júnior Awan, OTR/L #[id number]

## 2021-10-13 NOTE — PROGRESS NOTES
Treatment time: 3.5 hrs    Net UF: 1000    Pre weight: 54.5 kg  Post weight: 53.2 kg  EDW: TBD    Access used: R CVC  Access function: Good    Medications or blood products given: Retacrit    Regular outpatient schedule: ADRIAN    Summary of response to treatment: Patient tolerated treatment fair. Access ran well at ordered BFR. Patient became increasingly confused during treatment. MD made aware. Patient began pulling at his catheter site during dressing change and the site became unstable. Applied gauze, tape and physically applied pressure to site. MD saw patient at bedside. Report given to primary RN as well as CVC status. Accompanied patient to room. VSS    Copy of dialysis treatment record placed in chart, to be scanned into EMR.

## 2021-10-13 NOTE — PROGRESS NOTES
[facility]ADNE [facility]IER[facility] NEPHROLOG[facility]    [facility]. [facility]              [phone]                         Interval History and plan:     Currently on dialysis  Got her dose of dialysis today  Appears to be still very confused  Though he now has ESRD current altered mental status is unlikely to be uremic encephalopathy given that he got dialysis 3 sessions already  At the end of dialysis he was bleeding at the catheter site after he tried to pull it  Hemostasis was maintained by pressure and dressing and I asked RN to call IR to see if some suture is needed  Spoke to son by bedside in detail he is considering palliative care/hospice    Plan: We will continue to follow for now  He might be hospice appropriate if mental status does not improve and if it is irreversible    Acute Kidney Injury/ CKD 4- now ESRD  ADRIAN likely due to -prerenal etiology  Cr on consultation 3.3  Baseline Cr- 3  CKD likely due to diabetes hypertension recurrent ADRIAN  He was admitted to [facility] on 6/29- 7/8/21- with ADRIAN  Cr at DC was 3.38 from peak of 4.75(pre-renal)  UA- small blood, small nitrite, wbc 21-50  Renal Imaging:-10/21-right kidney 8.2 cm, left 8.3 cm, no hydronephrosis  Echo: 9/21-EF 15 to 00%, grade 2 diastolic dysfunction  0/06- nromal EF, RV pressure < 35 mmHg      Acute metabolic encephalopathy  f underlying dementia. Neuro assisted MRI  Brain-unable to obtain d/t sternal wires. Continue supportive care, avoid sedatives     UTI   . Completed treatment with IV ceftriaxone and IV Zyvox, d/obey on 10/4.     DMII        Cardiomyopathy   : Ef 15-20%     CAD   with elevated trop :     Medical management recommended per cardio. Continue BB, aspirin, statin     Anemia  : likely anemia of chronic disease due to CKD. on retacrit weekly       Urinary retention   flomax    intermittent straight cath. UTI   has been treated.     Urology   Ryan           Hypertension   BP: (109-125)/(58-67)  Pulse:  [81-85]   BP goal inpatient 197-420 systolic inpatient    Anemia of CKD  Iron sat normal  Hb 9.5 on consult    Acidosis  Oral bicarb     BPH  History of renal stones  CAD  Carotid artery occlusion  Diabetes mellitus        [facility] would like to thank Humberto Perales MD   for opportunity to serve this patient      Please call with questions at-   24 Hrs Answering service [phone] or  7 am- 5 pm via Perfect serve or cell phone  Josey Aguilar MD          CC/reason for consult :     ADRIAN     HPI :     Reshma De Guzman is a 78 y.o. male presented to   the hospital on 9/30/2021 with altered mental status from nursing facility. He is unable to provide details. Per reports he has had bright red blood per rectum. He was brought to the emergency room where work-up showed possible inferolateral ischemia on the heart and had QTc prolongation. CT scan of the head was done which was negative. He is scheduled to do MRI but has not done yet. He was found to have metabolic acidosis, ADRIAN. Also elevation of troponin. He had a UA done which showed possible infection, culture is pending also blood culture is sent and results are pending. He is currently on IV antibiotics. We are consulted for ADRIAN and related issues    ROS:     Positives Listed Bold. All other remaining systems are negative. Constitutional:  fever, chills, weakness, weight change, fatigue,      Skin:  rash, pruritus, hair loss, bruising, dry skin, petechiae. Head, Face, Neck   headaches, swelling,  cervical adenopathy. Respiratory: shortness of breath, cough, or wheezing  Cardiovascular: chest pain, palpitations, dizzy, edema  Gastrointestinal: nausea, vomiting, diarrhea, constipation,belly pain    Yellow skin, blood in stool  Musculoskeletal:  back pain, muscle weakness, gait problems,       joint pain or swelling.   Genitourinary:  dysuria, poor urine flow, flank pain, blood in urine  Neurologic:  vertigo, TIA'S, syncope, seizures, focal weakness  Psychosocial:  insomnia, anxiety, or depression. Additional positive findings: -         PMH/PSH/SH/Family History:     Past Medical History:   Diagnosis Date    CAD (coronary artery disease)     Chronic renal disease     Dr. 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 ([facility]     Type II or unspecified type diabetes mellitus without mention of complication, not stated as uncontrolled        Past Surgical History:   Procedure Laterality Date    CARDIAC SURGERY  2006    quad by pass    COLONOSCOPY      pt refuses to have one    EYE SURGERY Bilateral 2003    IR TUNNELED CATHETER PLACEMENT GREATER THAN 5 YEARS  10/11/2021    IR TUNNELED CATHETER PLACEMENT GREATER THAN 5 YEARS 10/11/2021 [facility] SPECIAL PROCEDURES    KIDNEY STONE SURGERY  1996    LUNG SURGERY  2006    scraped lungs        reports that he has never smoked. He has never used smokeless tobacco. He reports that he does not drink alcohol and does not use drugs. family history includes Diabetes in his mother.          Medication:     Current Facility-Administered Medications: heparin (porcine) 1000 UNIT/ML injection, , ,   famotidine (PEPCID) tablet 10 mg, 10 mg, Oral, Daily  epoetin long-epbx (RETACRIT) injection 1,000 Units, 1,000 Units, SubCUTAneous, Weekly  carvedilol (COREG) tablet 18.75 mg, 18.75 mg, Oral, BID WC  sodium bicarbonate tablet 1,300 mg, 1,300 mg, Oral, TID  [Held by provider] hydrALAZINE (APRESOLINE) tablet 10 mg, 10 mg, Oral, 3 times per day  insulin lispro (HUMALOG) injection vial 0-6 Units, 0-6 Units, SubCUTAneous, TID WC  insulin lispro (HUMALOG) injection vial 0-3 Units, 0-3 Units, SubCUTAneous, Nightly  tamsulosin (FLOMAX) capsule 0.4 mg, 0.4 mg, Oral, Daily  isosorbide dinitrate (ISORDIL) tablet 10 mg, 10 mg, Oral, TID  [Held by provider] insulin glargine (LANTUS) injection vial 14 Units, 14 Units, SubCUTAneous, Nightly  atorvastatin (LIPITOR) tablet 20 mg, 20 mg, Oral, Daily  aspirin EC tablet 81 mg, 81 mg, Oral, Daily  sodium chloride flush 0.9 % injection 10 mL, 10 mL, IntraVENous, 2 times per day  sodium chloride flush 0.9 % injection 10 mL, 10 mL, IntraVENous, PRN  0.9 % sodium chloride infusion, 25 mL, IntraVENous, PRN  magnesium sulfate 2000 mg in 50 mL IVPB premix, 2,000 mg, IntraVENous, PRN  promethazine (PHENERGAN) tablet 12.5 mg, 12.5 mg, Oral, Q6H PRN **OR** ondansetron (ZOFRAN) injection 4 mg, 4 mg, IntraVENous, Q6H PRN  senna (SENOKOT) tablet 8.6 mg, 1 tablet, Oral, Daily PRN  perflutren lipid microspheres (DEFINITY) injection 1.65 mg, 1.5 mL, IntraVENous, ONCE PRN  glucose (GLUTOSE) 40 % oral gel 15 g, 15 g, Oral, PRN  dextrose 50 % IV solution, 12.5 g, IntraVENous, PRN  glucagon (rDNA) injection 1 mg, 1 mg, IntraMUSCular, PRN  dextrose 5 % solution, 100 mL/hr, IntraVENous, PRN  Vitamin D (CHOLECALCIFEROL) tablet 2,000 Units, 2,000 Units, Oral, Daily       Vitals :     Vitals:    10/13/21 1415   BP: 124/67   Pulse: 84   Resp: 16   Temp: 97.8 °F (36.6 °C)   SpO2: 100%       I & O :       Intake/Output Summary (Last 24 hours) at 10/13/2021 1515  Last data filed at 10/13/2021 0824  Gross per 24 hour   Intake 360 ml   Output 300 ml   Net 60 ml        Physical Examination :     General appearance: male in NAD, alert and awake. HEENT: EOM intact, no icterus. Trachea is midline. Neck : No mass, appears symmetrical, no JVD   Respiratory: Respiratory effort appears normal, no wheeze, no crackles  Cardiovascular: Ausculation- No M/R/G, no edema  Abdomen: No visible mass or tenderness  Musculoskeletal:  No clubbing,cyanosis, joints with no swelling or deformity. Skin:no rashes, ulcers, induration, no jaundice. Neuro: face symmetric, no focal deficits. Appropriate responses.  CN 2-12 grossly intact    Additional findings:-        LABS:     Recent Labs     10/13/21  0733   WBC 5.2   HGB 7.8*   HCT 23.1*   PLT 50*     Recent Labs     10/11/21  0615 10/12/21  0701 10/13/21  0733    144 137   K 4.2 4.5 4.3    107 101   CO2 23 23 25   BUN 46* 34* 20   CREATININE 4.3* 3.6* 2.9*   GLUCOSE 122* 105* 202*

## 2021-10-13 NOTE — PROGRESS NOTES
Suture placed by Dr Markel Gallego. Bleeding stopped when sent to floor.  Suture can be removed by dialysis on Friday or patient can come back down to IR for suture removal

## 2021-10-13 NOTE — PROGRESS NOTES
Physical Therapy and Occupational Therapy  Attempted PT and OT follow up session this afternoon, pt just back from dialysis and is in restraints. Per RN not appropriate for therapy today. Will cont per POC as appropriate.   Jules Escalante, PT  Bessy Palmer, OTR/L

## 2021-10-13 NOTE — PROGRESS NOTES
Comprehensive Nutrition Assessment    Type and Reason for Visit:  Reassess    Nutrition Recommendations/Plan:   1. Continue carb control diet   2. Modify ONS to Magic Cups - monitor acceptance   3. Assist with meals as needed   4. Due to inadequate intake, discussion of palliative care vs nutrition support should be considered. Consult dietitian if tube feeding is desired and consistent with patient's plan of care. 5. Monitor nutrition adequacy, pertinent labs, bowel habits, wt changes, and clinical progress    Nutrition Assessment:  Follow up: Pt unavailable at time of visit, at dialysis. Pt nutritionally declining AEB poor PO intakes recorded in EMR (0-50%). Remains on carb control diet. Nepro ordered TID. Weights trending down this admission. Plans for hospice meeting this afternoon. If PO intakes do not increase and consistent with GOC, consider initiation of nutrition support. Malnutrition Assessment:  Malnutrition Status: At risk for malnutrition (Comment)    Context:  Acute Illness       Estimated Daily Nutrient Needs:  Energy (kcal):  4043-4975; Weight Used for Energy Requirements:  Current (62.3kg)     Protein (g):  62-75; Weight Used for Protein Requirements:  Current (1-1.2)        Fluid (ml/day):   ; Method Used for Fluid Requirements:  1 ml/kcal      Nutrition Related Findings:  Nausea. Active BS. BM x3 on 10/12 - loose. Wounds:  None       Current Nutrition Therapies:    ADULT DIET; Regular; 4 carb choices (60 gm/meal)  Adult Oral Nutrition Supplement;  Renal Oral Supplement    Anthropometric Measures:  · Height: 5' 8\" (172.7 cm)  · Current Body Weight: 120 lb (54.4 kg)   · Admission Body Weight: 135 lb (61.2 kg) (bed scale)    · Ideal Body Weight: 154 lbs;   · BMI: 18.3  · BMI Categories: Underweight (BMI less than 22) age over 72       Nutrition Diagnosis:   · Inadequate energy intake related to cognitive or neurological impairment as evidenced by intake 0-25%, intake 26-50%      Nutrition Interventions:   Food and/or Nutrient Delivery:  Continue Current Diet, Modify Oral Nutrition Supplement  Nutrition Education/Counseling:  Education not indicated   Coordination of Nutrition Care:  Continue to monitor while inpatient    Goals:  Pt will consume 50% or more of meals during this admission       Nutrition Monitoring and Evaluation:   Behavioral-Environmental Outcomes:  None Identified   Food/Nutrient Intake Outcomes:  Food and Nutrient Intake, Supplement Intake  Physical Signs/Symptoms Outcomes:  Biochemical Data, Weight, Skin, Nutrition Focused Physical Findings     Discharge Planning:    Continue current diet, Continue Oral Nutrition Supplement     Electronically signed by Jonnie Preciado MS, RD, LD on 10/13/21 at 1:12 PM EDT    Contact: [phone]

## 2021-10-13 NOTE — CARE COORDINATION
No call back from Aleksandr with central admissions at [facility]. CM called and spoke with Aleksandr, who confirmed she received referral yesterday - states she will follow patient's case and make referral to [facility]tran for approval of in-house HD at facility (** of note, facility only does M/W/F HD treatments). All other needed documentation (ie: physician and therapy notes) to be pulled from [facility] by Aleksandr. Cm team will continue to follow.

## 2021-10-13 NOTE — PROGRESS NOTES
Pt actively hallucinating. Pt states he is seeing women in his room cooking him breakfast. He states that [place] was also in his room which is not true unfortunately. Will make MD aware.

## 2021-10-13 NOTE — PROGRESS NOTES
Brief Postoperative Note    Ruth Land  YOB: 1945  [id number]    Pre-operative Diagnosis: Bleeding TDC    Post-operative Diagnosis: Same    Procedure: catheter check    Anesthesia: Local    Surgeons/Assistants: Mihir Damon MD    Estimated Blood Loss: less than 5    Complications: None    Specimens: Was Not Obtained    Findings:   Catheter in good position. Purse string suture placed at catheter insertion site. Suture to be removed after next dialysis session.     Electronically signed by Mihir Damon MD on 10/13/2021 at 3:36 PM

## 2021-10-13 NOTE — PROGRESS NOTES
Hospitalist Progress Note      PCP: Natalie Olivas    Date of Admission: 9/30/2021    Chief Complaint: [address] Course: H&P reviewed     Subjective: Ryan in place. Patient resting comfortably and denies any complaints. HD per nephro. Family mtg with Hospice today. Medications:  Reviewed    Infusion Medications    sodium chloride Stopped (10/02/21 0344)    dextrose       Scheduled Medications    famotidine  10 mg Oral Daily    epoetin long-epbx  1,000 Units SubCUTAneous Weekly    carvedilol  18.75 mg Oral BID WC    sodium bicarbonate  1,300 mg Oral TID    [Held by provider] hydrALAZINE  10 mg Oral 3 times per day    insulin lispro  0-6 Units SubCUTAneous TID WC    insulin lispro  0-3 Units SubCUTAneous Nightly    tamsulosin  0.4 mg Oral Daily    isosorbide dinitrate  10 mg Oral TID    [Held by provider] insulin glargine  14 Units SubCUTAneous Nightly    atorvastatin  20 mg Oral Daily    aspirin  81 mg Oral Daily    sodium chloride flush  10 mL IntraVENous 2 times per day    Vitamin D  2,000 Units Oral Daily     PRN Meds: sodium chloride flush, sodium chloride, magnesium sulfate, promethazine **OR** ondansetron, senna, perflutren lipid microspheres, glucose, dextrose, glucagon (rDNA), dextrose      Intake/Output Summary (Last 24 hours) at 10/13/2021 0925  Last data filed at 10/13/2021 0824  Gross per 24 hour   Intake 760 ml   Output 2200 ml   Net -1440 ml       Physical Exam Performed:    /63   Pulse 85   Temp 97.6 °F (36.4 °C) (Oral)   Resp 18   Ht 5' 8\" (1.727 m)   Wt 132 lb 7.9 oz (60.1 kg)   SpO2 95%   BMI 20.15 kg/m²      General appearance: No apparent distress, appears stated age and cooperative. Legally blind  HEENT: Pupils equal, round, Conjunctivae/corneas clear. Neck: Supple, with full range of motion. No jugular venous distention. Trachea midline. Respiratory:  Normal respiratory effort.  Clear to auscultation, bilaterally without Rales/Wheezes/Rhonchi. Cardiovascular: Regular rate and rhythm with normal S1/S2 without murmurs, rubs or gallops. Abdomen: Soft, non-tender, non-distended with normal bowel sounds. Musculoskeletal: No overt significant bilateral edema  Skin:  Warm and dry  Neurologic:  Legally blind otherwise Neurovascularly intact without any focal sensory/motor deficits. Cranial nerves: II-XII intact, grossly non-focal.  Psychiatric: Alert and oriented,  limited insight       Labs:   Recent Labs     10/13/21  0733   WBC 5.2   HGB 7.8*   HCT 23.1*   PLT 50*     Recent Labs     10/11/21  0615 10/12/21  0701 10/13/21  0733    144 137   K 4.2 4.5 4.3    107 101   CO2 23 23 25   BUN 46* 34* 20   CREATININE 4.3* 3.6* 2.9*   CALCIUM 8.3 8.4 8.3     No results for input(s): AST, ALT, BILIDIR, BILITOT, ALKPHOS in the last 72 hours. No results for input(s): INR in the last 72 hours. No results for input(s): Nisha Oneida in the last 72 hours. Urinalysis:      Lab Results   Component Value Date    NITRU Negative 09/30/2021    WBCUA 21-50 09/30/2021    BACTERIA 3+ 09/30/2021    RBCUA 5-10 09/30/2021    BLOODU SMALL 09/30/2021    SPECGRAV 1.015 09/30/2021    GLUCOSEU Negative 09/30/2021       Radiology:  IR TUNNELED CVC PLACE WO SQ PORT/PUMP > 5 YEARS   Final Result   Successful ultrasound and fluoroscopy guided tunneled catheter placement with   catheter tip in the right atrium. NM Cardiac Stress Test Nuclear Imaging   Final Result      VL Extremity Venous Left   Final Result      XR CHEST PORTABLE   Final Result   1. Congestive heart failure. US RETROPERITONEAL COMPLETE   Final Result   Grossly unremarkable ultrasound of the kidneys and urinary bladder. No   hydronephrosis. CT HEAD WO CONTRAST   Final Result   No acute intracranial abnormality.       Small right mastoid effusion                 Assessment/Plan:    Active Hospital Problems    Diagnosis     Elevated troponin [R77.8]     Cardiomyopathy ([facility] [I42.9]     Normocytic anemia [D64.9]     Acute renal failure superimposed on chronic kidney disease (HCC) [N17.9, N18.9]     Acute UTI [N39.0]     Acute encephalopathy [G93.40]     [facility] [E11.9]     Hypertension [I10]     Hyperlipidemia [E78.5]     CAD (coronary artery disease) [I25.10]      BRBPR with anemia: no acute GIB noted inpatient with no need for endoscopy at this time  per GI. Continue to follow clinically for any reoccurrence. hgb stable    Acute metabolic encephalopathy: likely due to UTI, worsening of underlying dementia. Neuro assisted MRI  Brain-unable to obtain d/t sternal wires. Continue supportive care, avoid sedatives    ADRIAN on CKD : likely prerenal , had improved with IVF. nephro assisting. Cr uptrending . nephro assisting. Started on IVF. HD per nephro. Monitor and avoid nephrotoxins    UTI with possible acute mastoditis:UC grew skin/urogenital rick. Completed treatment with IV ceftriaxone and IV Zyvox, d/obey on 10/4. DMII with hypoglycemia: Lantus held with no further hypoglycemia. Continue SSI . Monitor and adjust insulin doses as needed    Cardiomyopathy : Ef 15-20% on echo. continue beta-blocker, hydralazine and nitrate combination. No ACE/ARB/ARNI/aldosterone antagonist due to renal insufficiency. CAD with elevated trop : cardio assisting . Stress test abnormal-EF 32%, large scar with no ischemia. Medical management recommended per cardio. Continue BB, aspirin, statin    Anemia: likely anemia of chronic disease due to CKD. on retacrit weekly      Urinary retention: persistent despite getting flomax and getting intermittent straight cath. UTI has been treated. Urology recs appreciated. Continue Ryan    DVT Prophylaxis: scds  Diet: ADULT DIET; Regular; 4 carb choices (60 gm/meal)  Adult Oral Nutrition Supplement;  Renal Oral Supplement  Code Status: Full Code    PT/OT Eval Status: consulted    [address] [address]

## 2021-10-14 LAB
ANION GAP SERPL CALCULATED.3IONS-SCNC: 11 MMOL/L (ref 3–16)
BUN BLDV-MCNC: 10 MG/DL (ref 7–20)
CALCIUM SERPL-MCNC: 8.1 MG/DL (ref 8.3–10.6)
CHLORIDE BLD-SCNC: 97 MMOL/L (ref 99–110)
CO2: 25 MMOL/L (ref 21–32)
CREAT SERPL-MCNC: 1.9 MG/DL (ref 0.8–1.3)
GFR AFRICAN AMERICAN: 42
GFR NON-AFRICAN AMERICAN: 35
GLUCOSE BLD-MCNC: 119 MG/DL (ref 70–99)
GLUCOSE BLD-MCNC: 135 MG/DL (ref 70–99)
GLUCOSE BLD-MCNC: 203 MG/DL (ref 70–99)
GLUCOSE BLD-MCNC: 211 MG/DL (ref 70–99)
GLUCOSE BLD-MCNC: 215 MG/DL (ref 70–99)
PERFORMED ON: ABNORMAL
POTASSIUM SERPL-SCNC: 3.9 MMOL/L (ref 3.5–5.1)
SODIUM BLD-SCNC: 133 MMOL/L (ref 136–145)

## 2021-10-14 PROCEDURE — 6370000000 HC RX 637 (ALT 250 FOR IP): Performed by: NURSE PRACTITIONER

## 2021-10-14 PROCEDURE — 1200000000 HC SEMI PRIVATE

## 2021-10-14 PROCEDURE — 80048 BASIC METABOLIC PNL TOTAL CA: CPT

## 2021-10-14 PROCEDURE — 97535 SELF CARE MNGMENT TRAINING: CPT

## 2021-10-14 PROCEDURE — 6370000000 HC RX 637 (ALT 250 FOR IP): Performed by: HOSPITALIST

## 2021-10-14 PROCEDURE — 6370000000 HC RX 637 (ALT 250 FOR IP): Performed by: INTERNAL MEDICINE

## 2021-10-14 PROCEDURE — 97530 THERAPEUTIC ACTIVITIES: CPT

## 2021-10-14 PROCEDURE — 36415 COLL VENOUS BLD VENIPUNCTURE: CPT

## 2021-10-14 PROCEDURE — 2580000003 HC RX 258: Performed by: HOSPITALIST

## 2021-10-14 RX ADMIN — SODIUM BICARBONATE 650 MG TABLET 1300 MG: at 20:37

## 2021-10-14 RX ADMIN — CARVEDILOL 18.75 MG: 6.25 TABLET, FILM COATED ORAL at 09:18

## 2021-10-14 RX ADMIN — INSULIN LISPRO 2 UNITS: 100 INJECTION, SOLUTION INTRAVENOUS; SUBCUTANEOUS at 13:40

## 2021-10-14 RX ADMIN — SODIUM CHLORIDE, PRESERVATIVE FREE 10 ML: 5 INJECTION INTRAVENOUS at 22:13

## 2021-10-14 RX ADMIN — INSULIN LISPRO 2 UNITS: 100 INJECTION, SOLUTION INTRAVENOUS; SUBCUTANEOUS at 18:23

## 2021-10-14 RX ADMIN — SODIUM CHLORIDE, PRESERVATIVE FREE 10 ML: 5 INJECTION INTRAVENOUS at 09:19

## 2021-10-14 RX ADMIN — ASPIRIN 81 MG: 81 TABLET, COATED ORAL at 09:18

## 2021-10-14 RX ADMIN — FAMOTIDINE 10 MG: 20 TABLET ORAL at 09:18

## 2021-10-14 RX ADMIN — INSULIN LISPRO 1 UNITS: 100 INJECTION, SOLUTION INTRAVENOUS; SUBCUTANEOUS at 20:39

## 2021-10-14 RX ADMIN — SODIUM BICARBONATE 650 MG TABLET 1300 MG: at 09:18

## 2021-10-14 RX ADMIN — ISOSORBIDE DINITRATE 10 MG: 10 TABLET ORAL at 20:37

## 2021-10-14 RX ADMIN — Medication 2000 UNITS: at 09:18

## 2021-10-14 RX ADMIN — ATORVASTATIN CALCIUM 20 MG: 10 TABLET, FILM COATED ORAL at 09:18

## 2021-10-14 RX ADMIN — TAMSULOSIN HYDROCHLORIDE 0.4 MG: 0.4 CAPSULE ORAL at 09:18

## 2021-10-14 RX ADMIN — ISOSORBIDE DINITRATE 10 MG: 10 TABLET ORAL at 09:18

## 2021-10-14 RX ADMIN — SODIUM BICARBONATE 650 MG TABLET 1300 MG: at 13:40

## 2021-10-14 RX ADMIN — CARVEDILOL 18.75 MG: 6.25 TABLET, FILM COATED ORAL at 18:22

## 2021-10-14 RX ADMIN — ISOSORBIDE DINITRATE 10 MG: 10 TABLET ORAL at 13:40

## 2021-10-14 ASSESSMENT — PAIN SCALES - GENERAL
PAINLEVEL_OUTOF10: 0

## 2021-10-14 NOTE — PROGRESS NOTES
Occupational Therapy  Facility/Department: [facility] B3 - MED SURG  Daily Treatment Note  NAME: Romy De Los Santos  : 1945  MRN: [id number]    Date of Service: 10/14/2021    Discharge Recommendations:  Subacute/Skilled Nursing Facility       Assessment   Performance deficits / Impairments: Decreased functional mobility ; Decreased ADL status; Decreased cognition;Decreased safe awareness;Decreased strength;Decreased endurance;Decreased balance;Decreased high-level IADLs;Decreased fine motor control;Decreased vision/visual deficit; Decreased coordination  Assessment: Pt seen for cotx with PT d.t assist level, cognition and decreased activity tolerance and to promote increased outcomes vs 1:1 session. Pt confused with poor STM noted, difficult to redirect, minimal active participation noted with ADls and bed mobility. Pt requires mod A x 2 for sit to stands and for functional mobility with RW, d/t posterior lean, poor vision and cognition. Confused with ADL tasks. Cont to recommend SNF at discharge. Prognosis: Guarded  OT Education: OT Role;Plan of Care;Transfer Training;Orientation;Home Exercise Program  Patient Education: not an independent learner  Barriers to Learning: cognition  REQUIRES OT FOLLOW UP: Yes  Activity Tolerance  Activity Tolerance: Treatment limited secondary to decreased cognition  Activity Tolerance: 102/54 HR 90 O2 95% on RA  Safety Devices  Safety Devices in place: Yes  Type of devices: Left in chair;Chair alarm in place;Call light within reach;Nurse notified;Gait belt (avasys)         Patient Diagnosis(es): The primary encounter diagnosis was Acute renal failure superimposed on chronic kidney disease, unspecified CKD stage, unspecified acute renal failure type ([facility] [address] Diagnoses of Urinary tract infection without hematuria, site unspecified and Altered mental status, unspecified altered mental status type were also pertinent to this visit.       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 has a past surgical history that includes Cardiac surgery (2006); Lung surgery (2006); Kidney stone surgery (1996); Eye surgery (Bilateral, 2003); Colonoscopy; and IR TUNNELED CVC PLACE WO SQ PORT/PUMP > 5 YEARS (10/11/2021). Restrictions  Restrictions/Precautions  Restrictions/Precautions: Up as Tolerated, General Precautions, Fall Risk  Position Activity Restriction  Other position/activity restrictions: AvaSys monitor in room, pt is legally blind, CHEL roth  Subjective   General  Chart Reviewed: Yes  Patient assessed for rehabilitation services?: Yes  Response to previous treatment: Patient with no complaints from previous session  Family / Caregiver Present: No  Subjective  Subjective: Pt supine, awakens to name, encouragement to participate  General Comment  Comments: RN clears for therapy  Pre Treatment Pain Screening  Intervention List: Patient able to continue with treatment  Vital Signs  Patient Currently in Pain: Denies   Orientation  Orientation  Overall Orientation Status: Impaired  Orientation Level: Oriented to person;Disoriented to time;Disoriented to place; Disoriented to situation  Objective    ADL  UE Dressing: Dependent/Total  LE Dressing: Dependent/Total  Toileting: Dependent/Total  Additional Comments: pt confused, difficulty following commands this date, poor participation in ADl tasks        Balance  Sitting Balance: Contact guard assistance  Standing Balance:  Moderate assistance  Standing Balance  Time: 1-2 min  Activity: bed to chair with RW  Comment: Pt mod Ax 2 for sit<>stand and to take a few steps to chair with mod/max A, pt needs [facility] for hand on walker and assist to maneuver RW  Functional Mobility  Functional - Mobility Device: Rolling Walker  Activity: Other  Assist Level: Dependent/Total  Functional Mobility Comments: Mod/max A x 2  Bed mobility  Supine to Sit: Maximum assistance;Dependent/Total;2 Person assistance  Sit to Supine: Maximum assistance;Dependent/Total;2 Person assistance  Transfers  Sit to stand: Moderate assistance;2 Person assistance  Stand to sit: Moderate assistance;2 Person assistance  Transfer Comments: mod A x 2 posterior lean     Cognition  Overall Cognitive Status: Exceptions  Arousal/Alertness: Delayed responses to stimuli  Following Commands: Follows one step commands with repetition; Follows one step commands with increased time  Attention Span: Attends with cues to redirect; Difficulty attending to directions  Memory: Decreased recall of biographical Information;Decreased short term memory;Decreased recall of recent events;Decreased recall of precautions;Decreased long term memory  Safety Judgement: Decreased awareness of need for safety  Problem Solving: Decreased awareness of errors  Insights: Not aware of deficits  Initiation: Requires cues for all  Sequencing: Requires cues for all  Cognition Comment: pt confused with very poor STM this date, poor active participation, limited command following        Plan   Plan  Times per week: 3-5x  Specific instructions for Next Treatment: cotx  Current Treatment Recommendations: Balance Training, Functional Mobility Training, Safety Education & Training, Cognitive Reorientation, Self-Care / ADL, Patient/Caregiver Education & Training, Home Management Training, Endurance Training    AM-PAC Score        AM-[facility] Inpatient Daily Activity Raw Score: 8 (10/14/21 0918)  AM-PAC Inpatient ADL T-Scale Score : 22.86 (10/14/21 0918)  ADL Inpatient CMS 0-100% Score: 85.69 (10/14/21 0918)  ADL Inpatient CMS G-Code Modifier : CM (10/14/21 8976)    Goals  Short term goals  Time Frame for Short term goals: 1 week by 10/8  Short term goal 1: Pt will complete toileting with Mod A by 10/4-- GOAL NOT MET, dependent 10/14/21  Short term goal 2: Pt will complete LBD with Mod  A-- GOAL NOT MET, total  10/14/21  Short term goal 3: Pt will complete grooming seated EOB with Min A-- GOAL NOT MET, NT 10/14/21  Short term goal 4: Pt will complete UBD seated EOB with Min A-- GOAL MOT MET, max 10/14/21  Long term goals  Time Frame for Long term goals : STGs=LTGs  Patient Goals   Patient goals : unable to state       Therapy Time   Individual Concurrent Group Co-treatment   Time In 0806         Time Out 0830         Minutes 24         Timed Code Treatment Minutes: 24 Minutes   If pt discharges prior to next session, this note will serve as discharge summary. See case management note for discharge disposition.         Sabrina Troy, OT

## 2021-10-14 NOTE — PROGRESS NOTES
[facility]    [facility] [employer] [place]              [phone]                         Interval History and plan:     Mental status better this morning  Bleeding from catheter after he tried to pull yesterday during dialysis-hemostasis maintained by IR yesterday  Appreciate help  He spoke to son in detail about goals of care  At this time wanted to continue with dialysis which will need to be done as an outpatient  His urine output is low at 300    Plan:   His creatinine is 1.9  That is post dialysis  He has significant loss of muscle mass and creatinine is not accurate reflection of his renal function  He also has progressive kidney failure so it appears to be ESRD  However if his  urine output picks up he will need 24-hour creatinine clearance to determine recovery before removing from dialysis  He has been seen by neurology for altered mental status  Dialysis hasn't helped his mental status significantly    Okay to discharge from renal perspective when dialysis is set up and okay with the primary team      Acute Kidney Injury/ CKD 4- now ESRD  ADRIAN likely due to -prerenal etiology  Cr on consultation 3.3  Baseline Cr- 3  CKD likely due to diabetes hypertension recurrent ADRIAN  He was admitted to [facility] on 6/29- 7/8/21- with ADRIAN  Cr at DC was 3.38 from peak of 4.75(pre-renal)  UA- small blood, small nitrite, wbc 21-50  Renal Imaging:-10/21-right kidney 8.2 cm, left 8.3 cm, no hydronephrosis  Echo: 9/21-EF 15 to 09%, grade 2 diastolic dysfunction  5/12- nromal EF, RV pressure < 35 mmHg      Acute metabolic encephalopathy  f underlying dementia. Neuro assisted MRI  Brain-unable to obtain d/t sternal wires. Continue supportive care, avoid sedatives     UTI   . Completed treatment with IV ceftriaxone and IV Zyvox, d/obey on 10/4.     DMII        Cardiomyopathy   : Ef 15-20%     CAD   with elevated trop :     Medical management recommended per cardio.   Continue BB, aspirin, statin     Anemia  : likely anemia of chronic disease due to CKD. on retacrit weekly       Urinary retention   flomax    intermittent straight cath. UTI   has been treated. Urology   Ryan           Hypertension   BP: (102-109)/(53-54)  Pulse:  [77-90]   BP goal inpatient 245-000 systolic inpatient    Anemia of CKD  Iron sat normal  Hb 9.5 on consult    Acidosis  Oral bicarb     BPH  History of renal stones  CAD  Carotid artery occlusion  Diabetes mellitus        [facility] would like to thank Jose Luis Foss MD   for opportunity to serve this patient      Please call with questions at-   24 Hrs Answering service [phone] or  7 am- 5 pm via Perfect serve or cell phone  Stephen Goodson MD          CC/reason for consult :     ADRIAN     HPI :     Melinda Haynes is a 78 y.o. male presented to   the hospital on 9/30/2021 with altered mental status from nursing facility. He is unable to provide details. Per reports he has had bright red blood per rectum. He was brought to the emergency room where work-up showed possible inferolateral ischemia on the heart and had QTc prolongation. CT scan of the head was done which was negative. He is scheduled to do MRI but has not done yet. He was found to have metabolic acidosis, ADRIAN. Also elevation of troponin. He had a UA done which showed possible infection, culture is pending also blood culture is sent and results are pending. He is currently on IV antibiotics. We are consulted for ADRIAN and related issues    ROS:     Positives Listed Bold. All other remaining systems are negative. Constitutional:  fever, chills, weakness, weight change, fatigue,      Skin:  rash, pruritus, hair loss, bruising, dry skin, petechiae. Head, Face, Neck   headaches, swelling,  cervical adenopathy.      Respiratory: shortness of breath, cough, or wheezing  Cardiovascular: chest pain, palpitations, dizzy, edema  Gastrointestinal: nausea, vomiting, diarrhea, constipation,belly pain    Yellow skin, blood in stool  Musculoskeletal:  back pain, muscle weakness, gait problems,       joint pain or swelling. Genitourinary:  dysuria, poor urine flow, flank pain, blood in urine  Neurologic:  vertigo, TIA'S, syncope, seizures, focal weakness  Psychosocial:  insomnia, anxiety, or depression. Additional positive findings: -         PMH/PSH/SH/Family History:     Past Medical History:   Diagnosis Date    CAD (coronary artery disease)     Chronic renal disease     Dr. Sharee Coffman Hyperlipidemia     Hypertension     [facility]inopathy [facility] to [facility] DM ([facility] [address]     Type II or unspecified type diabetes mellitus without mention of complication, not stated as uncontrolled        Past Surgical History:   Procedure Laterality Date    CARDIAC SURGERY  2006    quad by pass    COLONOSCOPY      pt refuses to have one    EYE SURGERY Bilateral 2003    IR TUNNELED CATHETER PLACEMENT GREATER THAN 5 YEARS  10/11/2021    IR TUNNELED CATHETER PLACEMENT GREATER THAN 5 YEARS 10/11/2021 [facility] SPECIAL PROCEDURES    KIDNEY STONE SURGERY  1996    LUNG SURGERY  2006    scraped lungs        reports that he has never smoked. He has never used smokeless tobacco. He reports that he does not drink alcohol and does not use drugs. family history includes Diabetes in his mother.          Medication:     Current Facility-Administered Medications: famotidine (PEPCID) tablet 10 mg, 10 mg, Oral, Daily  epoetin long-epbx (RETACRIT) injection 1,000 Units, 1,000 Units, SubCUTAneous, Weekly  carvedilol (COREG) tablet 18.75 mg, 18.75 mg, Oral, BID   sodium bicarbonate tablet 1,300 mg, 1,300 mg, Oral, TID  [Held by provider] hydrALAZINE (APRESOLINE) tablet 10 mg, 10 mg, Oral, 3 times per day  insulin lispro (HUMALOG) injection vial 0-6 Units, 0-6 Units, SubCUTAneous, TID   insulin lispro (HUMALOG) injection vial 0-3 Units, 0-3 Units, SubCUTAneous, Nightly  tamsulosin (FLOMAX) capsule 0.4 mg, 0.4 mg, Oral, Daily  isosorbide dinitrate (ISORDIL) tablet 10 mg, 10 mg, Oral, TID  [Held by provider] insulin glargine (LANTUS) injection vial 14 Units, 14 Units, SubCUTAneous, Nightly  atorvastatin (LIPITOR) tablet 20 mg, 20 mg, Oral, Daily  aspirin EC tablet 81 mg, 81 mg, Oral, Daily  sodium chloride flush 0.9 % injection 10 mL, 10 mL, IntraVENous, 2 times per day  sodium chloride flush 0.9 % injection 10 mL, 10 mL, IntraVENous, PRN  0.9 % sodium chloride infusion, 25 mL, IntraVENous, PRN  magnesium sulfate 2000 mg in 50 mL IVPB premix, 2,000 mg, IntraVENous, PRN  promethazine (PHENERGAN) tablet 12.5 mg, 12.5 mg, Oral, Q6H PRN **OR** ondansetron (ZOFRAN) injection 4 mg, 4 mg, IntraVENous, Q6H PRN  senna (SENOKOT) tablet 8.6 mg, 1 tablet, Oral, Daily PRN  perflutren lipid microspheres (DEFINITY) injection 1.65 mg, 1.5 mL, IntraVENous, ONCE PRN  glucose (GLUTOSE) 40 % oral gel 15 g, 15 g, Oral, PRN  dextrose 50 % IV solution, 12.5 g, IntraVENous, PRN  glucagon (rDNA) injection 1 mg, 1 mg, IntraMUSCular, PRN  dextrose 5 % solution, 100 mL/hr, IntraVENous, PRN  Vitamin D (CHOLECALCIFEROL) tablet 2,000 Units, 2,000 Units, Oral, Daily       Vitals :     Vitals:    10/14/21 0800   BP: (!) 102/54   Pulse: 90   Resp: 16   Temp: 97.9 °F (36.6 °C)   SpO2: 95%       I & O :       Intake/Output Summary (Last 24 hours) at 10/14/2021 1014  Last data filed at 10/14/2021 0518  Gross per 24 hour   Intake 120 ml   Output 200 ml   Net -80 ml        Physical Examination :     General appearance: male in NAD, alert and awake. HEENT: EOM intact, no icterus. Trachea is midline. Neck : No mass, appears symmetrical, no JVD   Respiratory: Respiratory effort appears normal, no wheeze, no crackles  Cardiovascular: Ausculation- No M/R/G, no edema  Abdomen: No visible mass or tenderness  Musculoskeletal:  No clubbing,cyanosis, joints with no swelling or deformity. Skin:no rashes, ulcers, induration, no jaundice.    Neuro: face symmetric, no focal deficits. Appropriate responses.  CN 2-12 grossly intact    Additional findings:-        LABS:     Recent Labs     10/13/21  0733   WBC 5.2   HGB 7.8*   HCT 23.1*   PLT 50*     Recent Labs     10/12/21  0701 10/13/21  0733 10/14/21  0558    137 133*   K 4.5 4.3 3.9    101 97*   CO2 23 25 25   BUN 34* 20 10   CREATININE 3.6* 2.9* 1.9*   GLUCOSE 105* 202* 119*

## 2021-10-14 NOTE — PROGRESS NOTES
Physical Therapy  Facility/Department: [facility] B3 - MED SURG  Daily Treatment Note  NAME: Mirna Neumann  : 1945  MRN: [id number]    Date of Service: 10/14/2021    Discharge Recommendations:  Subacute/Skilled Nursing Facility   PT Equipment Recommendations  Equipment Needed: No    Assessment    Body structures, Functions, Activity limitations: Decreased functional mobility ; Decreased safe awareness;Decreased balance;Decreased endurance;Decreased strength;Decreased cognition;Decreased posture  Assessment: Patient seen for transfer training. Patient cleared by RN for therapy participation this date. Patient agreeable to therapy. Pt very lethargic and resistant to removing covers at start of session, but agreeable with encouragement. Patient completed supine>sit with max A x2, completed transfers with min A x2 and RW from bed and to chair. P.T .will continue to follow throughout LOS. Recommending DC to SNF d/t weakness, decreased safety awareness, and level of assist with impaired cognition. Treatment Diagnosis: weakness, decreased mobility, decreased cognition  Specific instructions for Next Treatment: Progress ther ex and mobility as tolerated  Prognosis: Good  Decision Making: Medium Complexity  PT Education: Goals;PT Role;Plan of Care;General Safety; Disease Specific Education;Gait Training;Transfer Training;Orientation; Functional Mobility Training;Equipment;Precautions  Patient Education: pt educated on importance of OOB mobility and safe hand placement - requires reinforcement  Barriers to Learning: Confusion  REQUIRES PT FOLLOW UP: Yes  Activity Tolerance  Activity Tolerance: Patient Tolerated treatment well;Patient limited by cognitive status  Activity Tolerance: 102/54, 90, 94%     Patient Diagnosis(es): The primary encounter diagnosis was Acute renal failure superimposed on chronic kidney disease, unspecified CKD stage, unspecified acute renal failure type ([facility] [address]  Diagnoses of Urinary tract infection without hematuria, site unspecified and Altered mental status, unspecified altered mental status type were also pertinent to this visit. 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 has a past surgical history that includes Cardiac surgery (2006); Lung surgery (2006); Kidney stone surgery (1996); Eye surgery (Bilateral, 2003); Colonoscopy; and IR TUNNELED CVC PLACE WO SQ PORT/PUMP > 5 YEARS (10/11/2021). Restrictions  Restrictions/Precautions  Restrictions/Precautions: Up as Tolerated, General Precautions, Fall Risk  Position Activity Restriction  Other position/activity restrictions: AvaSys monitor in room, pt is legally blind, CHEL roth  Subjective   General  Chart Reviewed: Yes  Response To Previous Treatment: Patient with no complaints from previous session. Family / Caregiver Present: No  Referring Practitioner: Sweetie Smyth DO  Subjective  Subjective: pt in bed with sheet over head, agreeable to therapy with encouragement  Pain Screening  Patient Currently in Pain: Other (comment) (does not indicate)  Vital Signs  Patient Currently in Pain: Other (comment) (does not indicate)       Orientation  Orientation  Overall Orientation Status: Impaired  Cognition   Cognition  Overall Cognitive Status: Exceptions  Arousal/Alertness: Delayed responses to stimuli  Following Commands: Follows one step commands with repetition; Follows one step commands with increased time  Attention Span: Attends with cues to redirect; Difficulty attending to directions  Memory: Decreased recall of biographical Information;Decreased short term memory;Decreased recall of recent events;Decreased recall of precautions;Decreased long term memory  Safety Judgement: Decreased awareness of need for safety  Problem Solving: Decreased awareness of errors  Insights: Not aware of deficits  Initiation: Requires cues for all  Sequencing: Requires cues for all  Cognition Comment: pt confused with very poor STM this date, poor active participation, limited command following  Objective   Bed mobility  Rolling to Left: Maximum assistance  Rolling to Right: Maximum assistance  Supine to Sit: Maximum assistance;Dependent/Total;2 Person assistance (max A x2 with HOB elevated)  Transfers  Sit to Stand: 2 Person Assistance;Minimal Assistance (min A x2 with RW)  Stand to sit: Minimal Assistance;2 Person Assistance (min A x2 with RW)  Bed to Chair: 2 Person Assistance;Minimal assistance (min A x2 with RW)  Ambulation  Ambulation?: No                              AM-PAC Score  AM-PAC Inpatient Mobility Raw Score : 9 (10/14/21 1253)  AM-PAC Inpatient T-Scale Score : 30.55 (10/14/21 1253)  Mobility Inpatient CMS 0-100% Score: 81.38 (10/14/21 1253)  Mobility Inpatient CMS G-Code Modifier : CM (10/14/21 1253)          Goals  Short term goals  Time Frame for Short term goals: 1 week (10/7) unless otherwise specified - goal time frame extended x 1 week to 10/14/21 secondary to longer-than-expected LOS; extended again to 10/21  Short term goal 1: pt to perform bed mobility with supervision; 10/14 - max A x2  Short term goal 2: pt to perform transfers with CGA x 1; 10/14 - min A x2 and RW  Short term goal 3: pt to amb with least restrictive or no device x 50 feet with CGA x 1; 10/14 not safe to attempt d/t lethargy  Short term goal 4: pt to participate in LE Ex 8-10 reps by 10/5 - MET 10/06/21; 10/11 continue  Patient Goals   Patient goals : Pt is confused, does not state goals when asked    Plan    Plan  Times per week: 3-5x/week in acute care  Times per day: Daily  Specific instructions for Next Treatment: Progress ther ex and mobility as tolerated  Current Treatment Recommendations: Strengthening, Transfer Training, Endurance Training, Cognitive

## 2021-10-14 NOTE — PROGRESS NOTES
Hospitalist Progress Note      PCP: Channing Ag    Date of Admission: 9/30/2021    Chief Complaint: [address] Course: H&P reviewed     Subjective: Ryan in place. Patient resting comfortably and denies any complaints. HD per nephro. Family mtg with Hospice, discussed case with son. Medications:  Reviewed    Infusion Medications    sodium chloride Stopped (10/02/21 0344)    dextrose       Scheduled Medications    famotidine  10 mg Oral Daily    epoetin long-epbx  1,000 Units SubCUTAneous Weekly    carvedilol  18.75 mg Oral BID WC    sodium bicarbonate  1,300 mg Oral TID    [Held by provider] hydrALAZINE  10 mg Oral 3 times per day    insulin lispro  0-6 Units SubCUTAneous TID WC    insulin lispro  0-3 Units SubCUTAneous Nightly    tamsulosin  0.4 mg Oral Daily    isosorbide dinitrate  10 mg Oral TID    [Held by provider] insulin glargine  14 Units SubCUTAneous Nightly    atorvastatin  20 mg Oral Daily    aspirin  81 mg Oral Daily    sodium chloride flush  10 mL IntraVENous 2 times per day    Vitamin D  2,000 Units Oral Daily     PRN Meds: sodium chloride flush, sodium chloride, magnesium sulfate, promethazine **OR** ondansetron, senna, perflutren lipid microspheres, glucose, dextrose, glucagon (rDNA), dextrose      Intake/Output Summary (Last 24 hours) at 10/14/2021 1827  Last data filed at 10/14/2021 1351  Gross per 24 hour   Intake 240 ml   Output 200 ml   Net 40 ml       Physical Exam Performed:    BP (!) 109/53   Pulse 73   Temp 97.8 °F (36.6 °C) (Oral)   Resp 16   Ht 5' 8\" (1.727 m)   Wt 132 lb 11.5 oz (60.2 kg)   SpO2 92%   BMI 20.18 kg/m²      General appearance: No apparent distress, appears stated age and cooperative. Legally blind  HEENT: Pupils equal, round, Conjunctivae/corneas clear. Neck: Supple, with full range of motion. No jugular venous distention. Trachea midline. Respiratory:  Normal respiratory effort.  Clear to auscultation, bilaterally without Rales/Wheezes/Rhonchi. Cardiovascular: Regular rate and rhythm with normal S1/S2 without murmurs, rubs or gallops. Abdomen: Soft, non-tender, non-distended with normal bowel sounds. Musculoskeletal: No overt significant bilateral edema  Skin:  Warm and dry  Neurologic:  Legally blind otherwise Neurovascularly intact without any focal sensory/motor deficits. Cranial nerves: II-XII intact, grossly non-focal.  Psychiatric: Alert,  limited insight       Labs:   Recent Labs     10/13/21  0733   WBC 5.2   HGB 7.8*   HCT 23.1*   PLT 50*     Recent Labs     10/12/21  0701 10/13/21  0733 10/14/21  0558    137 133*   K 4.5 4.3 3.9    101 97*   CO2 23 25 25   BUN 34* 20 10   CREATININE 3.6* 2.9* 1.9*   CALCIUM 8.4 8.3 8.1*     No results for input(s): AST, ALT, BILIDIR, BILITOT, ALKPHOS in the last 72 hours. No results for input(s): INR in the last 72 hours. No results for input(s): Adonica Hoose in the last 72 hours. Urinalysis:      Lab Results   Component Value Date    NITRU Negative 09/30/2021    WBCUA 21-50 09/30/2021    BACTERIA 3+ 09/30/2021    RBCUA 5-10 09/30/2021    BLOODU SMALL 09/30/2021    SPECGRAV 1.015 09/30/2021    GLUCOSEU Negative 09/30/2021       Radiology:  IR REPLACE TUNNELED CVC WO SQ PORT/PUMP SAME ACCESS   Final Result   1. Tunneled dialysis catheter working appropriately. 2. Pursestring suture at the catheter insertion site, to be removed in 2 days. IR TUNNELED CVC PLACE WO SQ PORT/PUMP > 5 YEARS   Final Result   Successful ultrasound and fluoroscopy guided tunneled catheter placement with   catheter tip in the right atrium. NM Cardiac Stress Test Nuclear Imaging   Final Result      VL Extremity Venous Left   Final Result      XR CHEST PORTABLE   Final Result   1. Congestive heart failure. US RETROPERITONEAL COMPLETE   Final Result   Grossly unremarkable ultrasound of the kidneys and urinary bladder. No   hydronephrosis.          CT HEAD WO CONTRAST   Final Result   No acute intracranial abnormality. Small right mastoid effusion                 Assessment/Plan:    Active Hospital Problems    Diagnosis     Elevated troponin [R77.8]     Cardiomyopathy ([facility] [address] [I42.9]     Normocytic anemia [D64.9]     Acute renal failure superimposed on chronic kidney disease (HCC) [N17.9, N18.9]     Acute UTI [N39.0]     Acute encephalopathy [G93.40]     DM (diabetes mellitus), type 2 ([facility] [address] [E11.9]     Hypertension [I10]     Hyperlipidemia [E78.5]     CAD (coronary artery disease) [I25.10]      BRBPR with anemia: no acute GIB noted inpatient with no need for endoscopy at this time  per GI. Continue to follow clinically for any reoccurrence. hgb stable    Acute metabolic encephalopathy: likely due to UTI, worsening of underlying dementia. Neuro assisted MRI  Brain-unable to obtain d/t sternal wires. Continue supportive care, avoid sedatives    ADRIAN on CKD : likely prerenal , had improved with IVF. nephro assisting. Cr uptrending . nephro assisting. Started on IVF. HD per nephro. Monitor and avoid nephrotoxins    UTI with possible acute mastoditis:UC grew skin/urogenital rick. Completed treatment with IV ceftriaxone and IV Zyvox, d/obey on 10/4. DMII with hypoglycemia: Lantus held with no further hypoglycemia. Continue SSI . Monitor and adjust insulin doses as needed    Cardiomyopathy : Ef 15-20% on echo. continue beta-blocker, hydralazine and nitrate combination. No ACE/ARB/ARNI/aldosterone antagonist due to renal insufficiency. CAD with elevated trop : cardio assisting . Stress test abnormal-EF 32%, large scar with no ischemia. Medical management recommended per cardio. Continue BB, aspirin, statin    Anemia: likely anemia of chronic disease due to CKD. on retacrit weekly      Urinary retention: persistent despite getting flomax and getting intermittent straight cath. UTI has been treated. Urology recs appreciated.   Continue Ryan    DVT Prophylaxis: scds (PLT 50)  Diet: ADULT DIET; Regular; 4 carb choices (60 gm/meal)  Adult Oral Nutrition Supplement;  Renal Oral Supplement  Code Status: Full Code    PT/OT Eval Status: consulted    Dispo -1-2 days, pending HD plan and snf arrangement    Rafia Bolden, APRN - CNP

## 2021-10-15 LAB
ANION GAP SERPL CALCULATED.3IONS-SCNC: 9 MMOL/L (ref 3–16)
BUN BLDV-MCNC: 16 MG/DL (ref 7–20)
CALCIUM SERPL-MCNC: 8.1 MG/DL (ref 8.3–10.6)
CHLORIDE BLD-SCNC: 95 MMOL/L (ref 99–110)
CO2: 27 MMOL/L (ref 21–32)
CREAT SERPL-MCNC: 2.7 MG/DL (ref 0.8–1.3)
GFR AFRICAN AMERICAN: 28
GFR NON-AFRICAN AMERICAN: 23
GLUCOSE BLD-MCNC: 159 MG/DL (ref 70–99)
GLUCOSE BLD-MCNC: 196 MG/DL (ref 70–99)
GLUCOSE BLD-MCNC: 204 MG/DL (ref 70–99)
GLUCOSE BLD-MCNC: 230 MG/DL (ref 70–99)
GLUCOSE BLD-MCNC: 308 MG/DL (ref 70–99)
PERFORMED ON: ABNORMAL
POTASSIUM SERPL-SCNC: 3.8 MMOL/L (ref 3.5–5.1)
SODIUM BLD-SCNC: 131 MMOL/L (ref 136–145)

## 2021-10-15 PROCEDURE — 6370000000 HC RX 637 (ALT 250 FOR IP): Performed by: INTERNAL MEDICINE

## 2021-10-15 PROCEDURE — 97116 GAIT TRAINING THERAPY: CPT

## 2021-10-15 PROCEDURE — 80048 BASIC METABOLIC PNL TOTAL CA: CPT

## 2021-10-15 PROCEDURE — 36415 COLL VENOUS BLD VENIPUNCTURE: CPT

## 2021-10-15 PROCEDURE — 2580000003 HC RX 258: Performed by: HOSPITALIST

## 2021-10-15 PROCEDURE — 97110 THERAPEUTIC EXERCISES: CPT

## 2021-10-15 PROCEDURE — 1200000000 HC SEMI PRIVATE

## 2021-10-15 PROCEDURE — 6370000000 HC RX 637 (ALT 250 FOR IP): Performed by: NURSE PRACTITIONER

## 2021-10-15 PROCEDURE — 6370000000 HC RX 637 (ALT 250 FOR IP): Performed by: HOSPITALIST

## 2021-10-15 RX ADMIN — ISOSORBIDE DINITRATE 10 MG: 10 TABLET ORAL at 09:13

## 2021-10-15 RX ADMIN — INSULIN LISPRO 1 UNITS: 100 INJECTION, SOLUTION INTRAVENOUS; SUBCUTANEOUS at 09:15

## 2021-10-15 RX ADMIN — ISOSORBIDE DINITRATE 10 MG: 10 TABLET ORAL at 22:16

## 2021-10-15 RX ADMIN — CARVEDILOL 18.75 MG: 6.25 TABLET, FILM COATED ORAL at 09:12

## 2021-10-15 RX ADMIN — SODIUM CHLORIDE, PRESERVATIVE FREE 10 ML: 5 INJECTION INTRAVENOUS at 09:13

## 2021-10-15 RX ADMIN — SODIUM BICARBONATE 650 MG TABLET 1300 MG: at 14:44

## 2021-10-15 RX ADMIN — SODIUM BICARBONATE 650 MG TABLET 1300 MG: at 09:12

## 2021-10-15 RX ADMIN — ATORVASTATIN CALCIUM 20 MG: 10 TABLET, FILM COATED ORAL at 09:12

## 2021-10-15 RX ADMIN — SODIUM CHLORIDE, PRESERVATIVE FREE 10 ML: 5 INJECTION INTRAVENOUS at 22:16

## 2021-10-15 RX ADMIN — TAMSULOSIN HYDROCHLORIDE 0.4 MG: 0.4 CAPSULE ORAL at 09:13

## 2021-10-15 RX ADMIN — ISOSORBIDE DINITRATE 10 MG: 10 TABLET ORAL at 14:44

## 2021-10-15 RX ADMIN — FAMOTIDINE 10 MG: 20 TABLET ORAL at 09:13

## 2021-10-15 RX ADMIN — SODIUM BICARBONATE 650 MG TABLET 1300 MG: at 22:16

## 2021-10-15 RX ADMIN — INSULIN LISPRO 2 UNITS: 100 INJECTION, SOLUTION INTRAVENOUS; SUBCUTANEOUS at 13:23

## 2021-10-15 RX ADMIN — ASPIRIN 81 MG: 81 TABLET, COATED ORAL at 09:12

## 2021-10-15 RX ADMIN — INSULIN LISPRO 2 UNITS: 100 INJECTION, SOLUTION INTRAVENOUS; SUBCUTANEOUS at 22:17

## 2021-10-15 RX ADMIN — Medication 2000 UNITS: at 09:12

## 2021-10-15 RX ADMIN — INSULIN LISPRO 1 UNITS: 100 INJECTION, SOLUTION INTRAVENOUS; SUBCUTANEOUS at 18:40

## 2021-10-15 ASSESSMENT — PAIN SCALES - GENERAL
PAINLEVEL_OUTOF10: 0

## 2021-10-15 NOTE — PLAN OF CARE
Problem: Falls - Risk of:  Goal: Will remain free from falls  Description: Will remain free from falls  10/15/2021 1519 by [facility]  Outcome: Ongoing  Note: Pt high fall risk. Instructed to use call light before getting out of bed and when in need of assistance. Call light within reach. Bed in low position. Bed alarm and nonskid footwear on. Av[employer] camera at bedside. Will continue to monitor. Problem: Skin Integrity:  Goal: Risk for impaired skin integrity will decrease  Description: Risk for impaired skin integrity will decrease  Outcome: Ongoing  Note: Pt is at risk for skin breakdown. Pt will have skin assessments every shift, Q2 turns, heels elevated off of the bed, and friction and shear prevented when possible. Will continue to monitor for signs of skin breakdown and enforce prevention measures. Problem: Serum Glucose Level - Abnormal:  Goal: Ability to maintain appropriate glucose levels will improve  Description: Ability to maintain appropriate glucose levels will improve  Outcome: Ongoing  Note: Pt will have accuchecks before meals and at bedtime with sliding scale insulin in place for coverage. Will continue to monitor for signs and symptoms of hypoglycemia and hyperglycemia throughout shift. For patient safety, an AVASYS camera has been placed in patient's room. [employer] monitoring needed for Confusion, Increased Fall Risk, Pulling at Lines and Delirium. Writer placed call to monitor observer to verify camera number [id number] and review patient name, reason for monitoring, and contact information for primary RN. Patient and Family/Healthcare Decision Maker notified of use of remote monitoring upon implementation of camera and verbalized understanding.

## 2021-10-15 NOTE — PROGRESS NOTES
Physical Therapy  Facility/Department: [facility] B3 - MED SURG  Daily Treatment Note  NAME: Connor Habermann  : 1945  MRN: [id number]    Date of Service: 10/15/2021    Discharge Recommendations:  Subacute/Skilled Nursing Facility   PT Equipment Recommendations  Equipment Needed: No    Assessment   Body structures, Functions, Activity limitations: Decreased functional mobility ; Decreased safe awareness;Decreased balance;Decreased endurance;Decreased strength;Decreased cognition;Decreased posture  Assessment: Pt tolerated ambulation training, transfer training and seated ther ex well. Pt is grossly min<>Mod A for ambulation with cues for all positioning d/t impaired vision. Pt will benefit from continued skilled PT to progress mobility. Recommend d/c to SNF d/t weakness, decreased safety, and impaired cognition. Treatment Diagnosis: weakness, decreased mobility, decreased cognition  Specific instructions for Next Treatment: Progress ther ex and mobility as tolerated  Prognosis: Good  PT Education: Goals;PT Role;Plan of Care;General Safety; Disease Specific Education;Gait Training;Transfer Training;Orientation; Functional Mobility Training;Equipment;Precautions  Patient Education: pt educated on importance of OOB mobility, ther ex, and safe hand placement - requires reinforcement  Barriers to Learning: Confusion  REQUIRES PT FOLLOW UP: Yes  Activity Tolerance  Activity Tolerance: Patient Tolerated treatment well;Patient limited by cognitive status  Activity Tolerance: BP soft throughout session, RN aware and pt denies dizziness/lightheadedness     Patient Diagnosis(es): The primary encounter diagnosis was Acute renal failure superimposed on chronic kidney disease, unspecified CKD stage, unspecified acute renal failure type ([facility] [address] Diagnoses of Urinary tract infection without hematuria, site unspecified and Altered mental status, unspecified altered mental status type were also pertinent to this visit.      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 has a past surgical history that includes Cardiac surgery (2006); Lung surgery (2006); Kidney stone surgery (1996); Eye surgery (Bilateral, 2003); Colonoscopy; and IR TUNNELED CVC PLACE WO SQ PORT/PUMP > 5 YEARS (10/11/2021). Restrictions  Restrictions/Precautions  Restrictions/Precautions: Up as Tolerated, General Precautions, Fall Risk  Position Activity Restriction  Other position/activity restrictions: A[employer] monitor in room, pt is legally blind, gonzalez  Subjective   General  Chart Reviewed: Yes  Response To Previous Treatment: Patient with no complaints from previous session. Family / Caregiver Present: No  Referring Practitioner: Honey Cronin DO  Subjective  Subjective: Pt supine in bed. Agreeable to therapy. Denies pain. General Comment  Comments: Pt resting in bed upon entry of PT, RN cleared pt for PT today  Pain Screening  Patient Currently in Pain: Denies  Vital Signs  Pulse: 76  Heart Rate Source: Monitor  BP: (!) 101/51  BP Location: Right upper arm  Patient Position: Sitting  Patient Currently in Pain: Denies  Oxygen Therapy  SpO2: 96 %  Pulse Oximeter Device Mode: Intermittent  Pulse Oximeter Device Location: Finger  O2 Device: None (Room air)       Orientation  Orientation  Overall Orientation Status: Impaired  Orientation Level: Oriented to person;Disoriented to time;Disoriented to place; Disoriented to situation  Cognition   Cognition  Overall Cognitive Status: Exceptions  Arousal/Alertness: Delayed responses to stimuli  Following Commands: Follows one step commands with repetition; Follows one step commands with increased time  Attention Span: Attends with cues to redirect; Difficulty attending to directions  Memory: Decreased recall of biographical Information;Decreased short term memory;Decreased recall of recent events;Decreased recall of precautions;Decreased long term memory  Safety Judgement: Decreased awareness of need for safety  Problem Solving: Decreased awareness of errors  Insights: Not aware of deficits  Initiation: Requires cues for all  Sequencing: Requires cues for all  Cognition Comment: pt confused with very poor STM this date  Objective   Bed mobility  Supine to Sit: Moderate assistance (Mod A x1 for trunk control and guiding hand placement d/t impaired vision.)  Sit to Supine: Unable to assess (Pt up in chair at end of session.)  Transfers  Sit to Stand: Minimal Assistance  Stand to sit: Minimal Assistance  Bed to Chair: Minimal assistance  Comment: Min A with cues for hand placement and RW management d/t impaired vision, no overt LOB or knee buckling; Ambulation  Ambulation?: Yes  More Ambulation?: No  Ambulation 1  Surface: level tile  Device: Rolling Walker  Assistance: Minimal assistance; Moderate assistance  Quality of Gait: Very short shuffling steps, required full guidance and negoaition of walker from therapist and for balance due to posterior lean, cues d/t low vision; increased fatigue with short lean against the wall for support for break with ambulation. Gait Deviations: Slow Brionna;Decreased step length;Decreased step height;Staggers; Deviated path  Distance: 3 feet + 30 feet     Balance  Posture: Fair  Sitting - Static: Good;-  Sitting - Dynamic: Fair;+  Standing - Static: Fair  Standing - Dynamic: Fair  Comments: Sitting Balance: grossly CGA  Standing Balance: Min<>Mod A x1 with RW  Exercises  Quad Sets: x12 BLE  Gluteal Sets: x12 BLE  Hip Flexion: x12 BLE  Hip Abduction: x12 BLE  Knee Long Arc Quad: x12 BLE  Ankle Pumps: x12 BLE  Comments: Cues for sequencing and positioning throughout.       AM-PAC Score  AM-PAC Inpatient Mobility Raw Score : 13 (10/15/21 1619)  -PAC Inpatient T-Scale Score : 36.74 (10/15/21 1619)  Mobility Inpatient CMS 0-100% Score: 64.91 (10/15/21 1619)  Mobility Inpatient CMS G-Code Modifier : CL (10/15/21 1619)        Goals  Short term goals  Time Frame for Short term goals: 1 week (10/7) unless otherwise specified - goal time frame extended x 1 week to 10/14/21 secondary to longer-than-expected LOS; extended again to 10/21  Short term goal 1: pt to perform bed mobility with supervision; 10/15 - Mod Ax1  Short term goal 2: pt to perform transfers with CGA x 1; 10/15 - min A <>Mod A x1 with RW  Short term goal 3: pt to amb with least restrictive or no device x 50 feet with CGA x 1; 10/15: 30 feet with RW  Short term goal 4: pt to participate in LE Ex 8-10 reps by 10/5 - MET 10/06/21; 10/11 continue, progressing, continue  Patient Goals   Patient goals : Pt is confused, does not state goals when asked    Plan    Plan  Times per week: 3-5x/week in acute care  Times per day: Daily  Specific instructions for Next Treatment: Progress ther ex and mobility as tolerated  Current Treatment Recommendations: Strengthening, Transfer Training, Endurance Training, Cognitive Reorientation, Balance Training, Gait Training, Functional Mobility Training, Safety Education & Training, Neuromuscular Re-education, Home Exercise Program, Patient/Caregiver Education & Training, Positioning  Safety Devices  Type of devices: All fall risk precautions in place, Gait belt, Patient at risk for falls, Nurse notified, Call light within reach, Left in chair, Telesitter in use, Chair alarm in place  Restraints  Initially in place: No     Therapy Time   Individual Concurrent Group Co-treatment   Time In 1510         Time Out 1536         Minutes 26         Timed Code Treatment Minutes: 26 Minutes     If pt is unable to be seen after this session, please let this note serve as discharge summary. Please see case management note for discharge disposition. Thank you.     Varsha Holder, PT

## 2021-10-15 NOTE — PROGRESS NOTES
Hospitalist Progress Note      PCP: Sandip Morgan    Date of Admission: 9/30/2021    Chief Complaint: [address] Course: H&P reviewed     Subjective: Ryan in place. Patient resting comfortably and denies any complaints. HD per nephro       Medications:  Reviewed    Infusion Medications    sodium chloride Stopped (10/02/21 0344)    dextrose       Scheduled Medications    famotidine  10 mg Oral Daily    epoetin long-epbx  1,000 Units SubCUTAneous Weekly    carvedilol  18.75 mg Oral BID WC    sodium bicarbonate  1,300 mg Oral TID    [Held by provider] hydrALAZINE  10 mg Oral 3 times per day    insulin lispro  0-6 Units SubCUTAneous TID WC    insulin lispro  0-3 Units SubCUTAneous Nightly    tamsulosin  0.4 mg Oral Daily    isosorbide dinitrate  10 mg Oral TID    [Held by provider] insulin glargine  14 Units SubCUTAneous Nightly    atorvastatin  20 mg Oral Daily    aspirin  81 mg Oral Daily    sodium chloride flush  10 mL IntraVENous 2 times per day    Vitamin D  2,000 Units Oral Daily     PRN Meds: sodium chloride flush, sodium chloride, magnesium sulfate, promethazine **OR** ondansetron, senna, perflutren lipid microspheres, glucose, dextrose, glucagon (rDNA), dextrose      Intake/Output Summary (Last 24 hours) at 10/15/2021 1625  Last data filed at 10/15/2021 0309  Gross per 24 hour   Intake 480 ml   Output 225 ml   Net 255 ml       Physical Exam Performed:    BP (!) 101/51   Pulse 76   Temp 97.9 °F (36.6 °C) (Oral)   Resp 16   Ht 5' 8\" (1.727 m)   Wt 132 lb 14.4 oz (60.3 kg)   SpO2 96%   BMI 20.21 kg/m²      General appearance: No apparent distress, appears stated age and cooperative. Legally blind  HEENT: Pupils equal, round, Conjunctivae/corneas clear. Neck: Supple, with full range of motion. No jugular venous distention. Trachea midline. Respiratory:  Normal respiratory effort. Clear to auscultation, bilaterally without Rales/Wheezes/Rhonchi.   Cardiovascular: Regular rate and rhythm with normal S1/S2 without murmurs, rubs or gallops. Abdomen: Soft, non-tender, non-distended with normal bowel sounds. Musculoskeletal: No overt significant bilateral edema  Skin:  Warm and dry  Neurologic:  Legally blind otherwise Neurovascularly intact without any focal sensory/motor deficits. Cranial nerves: II-XII intact, grossly non-focal.  Psychiatric: Alert,  limited insight       Labs:   Recent Labs     10/13/21  0733   WBC 5.2   HGB 7.8*   HCT 23.1*   PLT 50*     Recent Labs     10/13/21  0733 10/14/21  0558 10/15/21  0943    133* 131*   K 4.3 3.9 3.8    97* 95*   CO2 25 25 27   BUN 20 10 16   CREATININE 2.9* 1.9* 2.7*   CALCIUM 8.3 8.1* 8.1*     No results for input(s): AST, ALT, BILIDIR, BILITOT, ALKPHOS in the last 72 hours. No results for input(s): INR in the last 72 hours. No results for input(s): Kathyrn Belch in the last 72 hours. Urinalysis:      Lab Results   Component Value Date    NITRU Negative 09/30/2021    WBCUA 21-50 09/30/2021    BACTERIA 3+ 09/30/2021    RBCUA 5-10 09/30/2021    BLOODU SMALL 09/30/2021    SPECGRAV 1.015 09/30/2021    GLUCOSEU Negative 09/30/2021       Radiology:  IR REPLACE TUNNELED CVC WO SQ PORT/PUMP SAME ACCESS   Final Result   1. Tunneled dialysis catheter working appropriately. 2. Pursestring suture at the catheter insertion site, to be removed in 2 days. IR TUNNELED CVC PLACE WO SQ PORT/PUMP > 5 YEARS   Final Result   Successful ultrasound and fluoroscopy guided tunneled catheter placement with   catheter tip in the right atrium. NM Cardiac Stress Test Nuclear Imaging   Final Result      VL Extremity Venous Left   Final Result      XR CHEST PORTABLE   Final Result   1. Congestive heart failure. US RETROPERITONEAL COMPLETE   Final Result   Grossly unremarkable ultrasound of the kidneys and urinary bladder. No   hydronephrosis.          CT HEAD WO CONTRAST   Final Result   No acute intracranial abnormality. Small right mastoid effusion                 Assessment/Plan:    Active Hospital Problems    Diagnosis     Elevated troponin [R77.8]     Cardiomyopathy ([facility] [address] [I42.9]     Normocytic anemia [D64.9]     Acute renal failure superimposed on chronic kidney disease (HCC) [N17.9, N18.9]     Acute UTI [N39.0]     Acute encephalopathy [G93.40]     DM (diabetes mellitus), type 2 ([facility] [address] [E11.9]     Hypertension [I10]     Hyperlipidemia [E78.5]     CAD (coronary artery disease) [I25.10]      BRBPR with anemia: no acute GIB noted inpatient with no need for endoscopy at this time  per GI. Continue to follow clinically for any reoccurrence. hgb stable    Acute metabolic encephalopathy: likely due to UTI, worsening of underlying dementia. Neuro assisted MRI  Brain-unable to obtain d/t sternal wires. Continue supportive care, avoid sedatives    ADRIAN on CKD : likely prerenal , had improved with IVF. nephro assisting. Cr uptrending . nephro assisting. Started on IVF. HD per nephro. Monitor and avoid nephrotoxins    UTI with possible acute mastoditis:UC grew skin/urogenital rick. Completed treatment with IV ceftriaxone and IV Zyvox, d/obey on 10/4. DMII with hypoglycemia: Lantus held with no further hypoglycemia. Continue SSI . Monitor and adjust insulin doses as needed    Cardiomyopathy : Ef 15-20% on echo. continue beta-blocker, hydralazine and nitrate combination. No ACE/ARB/ARNI/aldosterone antagonist due to renal insufficiency. CAD with elevated trop : cardio assisting . Stress test abnormal-EF 32%, large scar with no ischemia. Medical management recommended per cardio. Continue BB, aspirin, statin    Anemia: likely anemia of chronic disease due to CKD. on retacrit weekly      Urinary retention: persistent despite getting flomax and getting intermittent straight cath. UTI has been treated. Urology recs appreciated. Continue Ryan    DVT Prophylaxis: scds (PLT 50)  Diet: ADULT DIET;  Regular; 4 carb choices (60 gm/meal)  Adult Oral Nutrition Supplement;  Renal Oral Supplement  Code Status: Full Code    PT/OT Eval Status: consulted    Dispo -HD planned 10/16, possible dc tomorrow to SNF    DANGELO Allen - STEPHANE

## 2021-10-15 NOTE — PROGRESS NOTES
Comprehensive Nutrition Assessment    Type and Reason for Visit:  Reassess    Nutrition Recommendations/Plan:   1. Continue carb control diet   2. Decrease Nepro to BID   3. Encourage PO intakes and assist as needed   4. Monitor nutrition adequacy, pertinent labs, bowel habits, wt changes, and clinical progress    Nutrition Assessment:  Follow up: Hospice meeting on 10/13, plans to continue dialysis at this time. Pt remains on carb control diet with variable PO intakes per EMR. Discussed with RN, pt eating well today. Drinking some of Nepro at meals, will decrease frequency d/t increased PO intakes. Will continue to monitor. Malnutrition Assessment:  Malnutrition Status: At risk for malnutrition (Comment)    Context:  Acute Illness       Estimated Daily Nutrient Needs:  Energy (kcal):  7643-3078; Weight Used for Energy Requirements:  Current (62.3kg)     Protein (g):  62-75; Weight Used for Protein Requirements:  Current (1-1.2)        Fluid (ml/day):   ; Method Used for Fluid Requirements:  1 ml/kcal      Nutrition Related Findings:  Active BS. Trace BLE edema. BM x1 on 10/13. Wounds:  None       Current Nutrition Therapies:    ADULT DIET; Regular; 4 carb choices (60 gm/meal)  Adult Oral Nutrition Supplement;  Renal Oral Supplement    Anthropometric Measures:  · Height: 5' 8\" (172.7 cm)  · Current Body Weight: 132 lb (59.9 kg)   · Admission Body Weight: 135 lb (61.2 kg) (bed scale)    · Ideal Body Weight: 154 lbs;   · BMI: 20.1  · BMI Categories: Underweight (BMI less than 22) age over 72       Nutrition Diagnosis:   · Inadequate energy intake related to cognitive or neurological impairment as evidenced by intake 0-25%, intake 26-50%      Nutrition Interventions:   Food and/or Nutrient Delivery:  Continue Current Diet, Modify Oral Nutrition Supplement  Nutrition Education/Counseling:  Education not indicated   Coordination of Nutrition Care:  Continue to monitor while inpatient    Goals:  Pt will consume 50% or more of meals during this admission       Nutrition Monitoring and Evaluation:   Behavioral-Environmental Outcomes:  None Identified   Food/Nutrient Intake Outcomes:  Food and Nutrient Intake, Supplement Intake  Physical Signs/Symptoms Outcomes:  Biochemical Data, Weight, Skin, Nutrition Focused Physical Findings     Discharge Planning:    Continue current diet, Continue Oral Nutrition Supplement     Electronically signed by Kaylen Bond MS, RD, LD on 10/15/21 at 2:29 PM EDT    Contact: [phone]

## 2021-10-15 NOTE — PROGRESS NOTES
[facility]                                               Progress note    Summary:   Reshma De Guzman is being seen by nephrology for Acute kidney injury (ADRIAN) on CKD-IV. Reshma De Guzman is a 78 y.o. male presented to the hospital on 9/30/2021 with altered mental status from nursing facility. He is unable to provide details. Per reports he has had bright red blood per rectum. He was brought to the emergency room where work-up showed possible inferolateral ischemia on the heart and had QTc prolongation. CT scan of the head was done which was negative. He is scheduled to do MRI but has not done yet. He was found to have metabolic acidosis, ADRIAN. Also elevation of troponin. He had a UA done which showed possible infection, culture is pending also blood culture is sent and results are pending. He is currently on IV antibiotics.       Interval History  Last dialyzed on Saturday. Tolerated well. Seems disoriented but is alert. Labs today pending. Plan:   - plan on iHD tomorrow. - no acute indications for RRT today. - noted discharge planning, might be transitioning to MWF HD as outpatient. Would prefer discharge after HD tomorrow. Nicky Watson MD  [facility] Nephrology  Office: [phone]    Assessment:   Acute Kidney Injury/ CKD 4- now ESRD  ADRIAN likely due to -prerenal etiology  Cr on consultation 3.3  Baseline Cr- 3  CKD likely due to diabetes hypertension recurrent ADRIAN  He was admitted to [facility] on 6/29- 7/8/21- with ADRIAN  Cr at DC was 3.38 from peak of 4.75(pre-renal)  UA- small blood, small nitrite, wbc 21-50  Renal Imaging:-10/21-right kidney 8.2 cm, left 8.3 cm, no hydronephrosis  Echo: 9/21-EF 15 to 45%, grade 2 diastolic dysfunction  2/36- nromal EF, RV pressure < 35 mmHg  Started HD this admission, will likely need long term.        Acute metabolic encephalopathy  f underlying dementia. Neuro assisted MRI  Brain-unable to obtain d/t sternal wires.  Continue Ausculation shows RRR no edema  Abdomen: No visible mass or tenderness, non distended. Musculoskeletal:  Joints with no swelling or deformity. Skin:no rashes, ulcers, induration, no jaundice. Neuro: face symmetric, no focal deficits.       Lab Results   Component Value Date    CREATININE 1.9 (H) 10/14/2021    BUN 10 10/14/2021     (L) 10/14/2021    K 3.9 10/14/2021    CL 97 (L) 10/14/2021    CO2 25 10/14/2021      Lab Results   Component Value Date    WBC 5.2 10/13/2021    HGB 7.8 (L) 10/13/2021    HCT 23.1 (L) 10/13/2021    MCV 95.4 10/13/2021    PLT 50 (L) 10/13/2021     Lab Results   Component Value Date    CALCIUM 8.1 (L) 10/14/2021

## 2021-10-15 NOTE — CARE COORDINATION
Cm called Taryn Kasper at [place] [address] central admissions [phone]. Left voice mail to see if HD arranged. Asked for return call. CM rec'd voice mail from Taryn Kasper at [place] [address]. Pt needs to be MWF on QD for HD prior to auth to be started. 12:05 PM  Per huddle pt is MWF HD. CM will reach out to [place] [address] again. CM called [facility]. Left VM stating pt now MWF HD. Asked them to have [facility] start auth.     2:56 PM  Cm informed by nurse, pt getting HD tomorrow. Would then be able to go to [place] [address] and start HD on Monday. Cm waiting for approval from [facility] at [address].

## 2021-10-16 LAB
ANION GAP SERPL CALCULATED.3IONS-SCNC: 11 MMOL/L (ref 3–16)
BUN BLDV-MCNC: 23 MG/DL (ref 7–20)
CALCIUM SERPL-MCNC: 8.3 MG/DL (ref 8.3–10.6)
CHLORIDE BLD-SCNC: 97 MMOL/L (ref 99–110)
CO2: 26 MMOL/L (ref 21–32)
CREAT SERPL-MCNC: 3.2 MG/DL (ref 0.8–1.3)
GFR AFRICAN AMERICAN: 23
GFR NON-AFRICAN AMERICAN: 19
GLUCOSE BLD-MCNC: 134 MG/DL (ref 70–99)
GLUCOSE BLD-MCNC: 150 MG/DL (ref 70–99)
GLUCOSE BLD-MCNC: 163 MG/DL (ref 70–99)
GLUCOSE BLD-MCNC: 193 MG/DL (ref 70–99)
GLUCOSE BLD-MCNC: 194 MG/DL (ref 70–99)
HCT VFR BLD CALC: 21.5 % (ref 40.5–52.5)
HEMOGLOBIN: 7.5 G/DL (ref 13.5–17.5)
MCH RBC QN AUTO: 32.2 PG (ref 26–34)
MCHC RBC AUTO-ENTMCNC: 34.6 G/DL (ref 31–36)
MCV RBC AUTO: 92.9 FL (ref 80–100)
PDW BLD-RTO: 15 % (ref 12.4–15.4)
PERFORMED ON: ABNORMAL
PLATELET # BLD: 63 K/UL (ref 135–450)
PMV BLD AUTO: 10.4 FL (ref 5–10.5)
POTASSIUM SERPL-SCNC: 3.5 MMOL/L (ref 3.5–5.1)
RBC # BLD: 2.32 M/UL (ref 4.2–5.9)
SODIUM BLD-SCNC: 134 MMOL/L (ref 136–145)
WBC # BLD: 4.6 K/UL (ref 4–11)

## 2021-10-16 PROCEDURE — 90935 HEMODIALYSIS ONE EVALUATION: CPT

## 2021-10-16 PROCEDURE — 6370000000 HC RX 637 (ALT 250 FOR IP): Performed by: INTERNAL MEDICINE

## 2021-10-16 PROCEDURE — 6370000000 HC RX 637 (ALT 250 FOR IP): Performed by: NURSE PRACTITIONER

## 2021-10-16 PROCEDURE — 1200000000 HC SEMI PRIVATE

## 2021-10-16 PROCEDURE — 2580000003 HC RX 258: Performed by: HOSPITALIST

## 2021-10-16 PROCEDURE — 6370000000 HC RX 637 (ALT 250 FOR IP): Performed by: HOSPITALIST

## 2021-10-16 PROCEDURE — 36415 COLL VENOUS BLD VENIPUNCTURE: CPT

## 2021-10-16 PROCEDURE — 80048 BASIC METABOLIC PNL TOTAL CA: CPT

## 2021-10-16 PROCEDURE — 85027 COMPLETE CBC AUTOMATED: CPT

## 2021-10-16 RX ADMIN — ISOSORBIDE DINITRATE 10 MG: 10 TABLET ORAL at 22:50

## 2021-10-16 RX ADMIN — SODIUM BICARBONATE 650 MG TABLET 1300 MG: at 13:14

## 2021-10-16 RX ADMIN — INSULIN LISPRO 1 UNITS: 100 INJECTION, SOLUTION INTRAVENOUS; SUBCUTANEOUS at 18:29

## 2021-10-16 RX ADMIN — Medication 2000 UNITS: at 13:13

## 2021-10-16 RX ADMIN — ATORVASTATIN CALCIUM 20 MG: 10 TABLET, FILM COATED ORAL at 13:14

## 2021-10-16 RX ADMIN — SODIUM CHLORIDE, PRESERVATIVE FREE 10 ML: 5 INJECTION INTRAVENOUS at 21:43

## 2021-10-16 RX ADMIN — INSULIN LISPRO 1 UNITS: 100 INJECTION, SOLUTION INTRAVENOUS; SUBCUTANEOUS at 22:51

## 2021-10-16 RX ADMIN — ASPIRIN 81 MG: 81 TABLET, COATED ORAL at 13:14

## 2021-10-16 RX ADMIN — SODIUM CHLORIDE, PRESERVATIVE FREE 10 ML: 5 INJECTION INTRAVENOUS at 13:14

## 2021-10-16 RX ADMIN — CARVEDILOL 18.75 MG: 6.25 TABLET, FILM COATED ORAL at 18:26

## 2021-10-16 RX ADMIN — SODIUM BICARBONATE 650 MG TABLET 1300 MG: at 22:50

## 2021-10-16 RX ADMIN — FAMOTIDINE 10 MG: 20 TABLET ORAL at 13:13

## 2021-10-16 RX ADMIN — TAMSULOSIN HYDROCHLORIDE 0.4 MG: 0.4 CAPSULE ORAL at 13:17

## 2021-10-16 RX ADMIN — ISOSORBIDE DINITRATE 10 MG: 10 TABLET ORAL at 13:14

## 2021-10-16 ASSESSMENT — PAIN SCALES - GENERAL
PAINLEVEL_OUTOF10: 0

## 2021-10-16 NOTE — PROGRESS NOTES
Hospitalist Progress Note      PCP: Felicia Sinha    Date of Admission: 9/30/2021    Chief Complaint: [address] Course: H&P reviewed     Subjective: Ryan in place. Patient resting comfortably and denies any complaints. HD today      Medications:  Reviewed    Infusion Medications    sodium chloride Stopped (10/02/21 0344)    dextrose       Scheduled Medications    famotidine  10 mg Oral Daily    epoetin long-epbx  1,000 Units SubCUTAneous Weekly    carvedilol  18.75 mg Oral BID WC    sodium bicarbonate  1,300 mg Oral TID    [Held by provider] hydrALAZINE  10 mg Oral 3 times per day    insulin lispro  0-6 Units SubCUTAneous TID WC    insulin lispro  0-3 Units SubCUTAneous Nightly    tamsulosin  0.4 mg Oral Daily    isosorbide dinitrate  10 mg Oral TID    [Held by provider] insulin glargine  14 Units SubCUTAneous Nightly    atorvastatin  20 mg Oral Daily    aspirin  81 mg Oral Daily    sodium chloride flush  10 mL IntraVENous 2 times per day    Vitamin D  2,000 Units Oral Daily     PRN Meds: sodium chloride flush, sodium chloride, magnesium sulfate, promethazine **OR** ondansetron, senna, perflutren lipid microspheres, glucose, dextrose, glucagon (rDNA), dextrose      Intake/Output Summary (Last 24 hours) at 10/16/2021 1506  Last data filed at 10/16/2021 1450  Gross per 24 hour   Intake 180 ml   Output 150 ml   Net 30 ml       Physical Exam Performed:    /67   Pulse 80   Temp 97.4 °F (36.3 °C) (Axillary)   Resp 16   Ht 5' 8\" (1.727 m)   Wt 113 lb 5.1 oz (51.4 kg)   SpO2 100%   BMI 17.23 kg/m²      General appearance: No apparent distress, appears stated age and cooperative. Legally blind  HEENT: Pupils equal, round, Conjunctivae/corneas clear. Neck: Supple, with full range of motion. No jugular venous distention. Trachea midline. Respiratory:  Normal respiratory effort. Clear to auscultation, bilaterally without Rales/Wheezes/Rhonchi.   Cardiovascular: Regular rate and rhythm with normal S1/S2 without murmurs, rubs or gallops. Abdomen: Soft, non-tender, non-distended with normal bowel sounds. Musculoskeletal: No overt significant bilateral edema  Skin:  Warm and dry  Neurologic:  Legally blind otherwise Neurovascularly intact without any focal sensory/motor deficits. Cranial nerves: II-XII intact, grossly non-focal.  Psychiatric: Alert,  limited insight       Labs:   Recent Labs     10/16/21  0643   WBC 4.6   HGB 7.5*   HCT 21.5*   PLT 63*     Recent Labs     10/14/21  0558 10/15/21  0943 10/16/21  0643   * 131* 134*   K 3.9 3.8 3.5   CL 97* 95* 97*   CO2 25 27 26   BUN 10 16 23*   CREATININE 1.9* 2.7* 3.2*   CALCIUM 8.1* 8.1* 8.3     No results for input(s): AST, ALT, BILIDIR, BILITOT, ALKPHOS in the last 72 hours. No results for input(s): INR in the last 72 hours. No results for input(s): Beatrice Mould in the last 72 hours. Urinalysis:      Lab Results   Component Value Date    NITRU Negative 09/30/2021    WBCUA 21-50 09/30/2021    BACTERIA 3+ 09/30/2021    RBCUA 5-10 09/30/2021    BLOODU SMALL 09/30/2021    SPECGRAV 1.015 09/30/2021    GLUCOSEU Negative 09/30/2021       Radiology:  IR REPLACE TUNNELED CVC WO SQ PORT/PUMP SAME ACCESS   Final Result   1. Tunneled dialysis catheter working appropriately. 2. Pursestring suture at the catheter insertion site, to be removed in 2 days. IR TUNNELED CVC PLACE WO SQ PORT/PUMP > 5 YEARS   Final Result   Successful ultrasound and fluoroscopy guided tunneled catheter placement with   catheter tip in the right atrium. NM Cardiac Stress Test Nuclear Imaging   Final Result      VL Extremity Venous Left   Final Result      XR CHEST PORTABLE   Final Result   1. Congestive heart failure. US RETROPERITONEAL COMPLETE   Final Result   Grossly unremarkable ultrasound of the kidneys and urinary bladder. No   hydronephrosis. CT HEAD WO CONTRAST   Final Result   No acute intracranial abnormality. Small right mastoid effusion                 Assessment/Plan:    Active Hospital Problems    Diagnosis     Elevated troponin [R77.8]     Cardiomyopathy ([facility] [address] [I42.9]     Normocytic anemia [D64.9]     Acute renal failure superimposed on chronic kidney disease (HCC) [N17.9, N18.9]     Acute UTI [N39.0]     Acute encephalopathy [G93.40]     DM (diabetes mellitus), type 2 ([facility] [address] [E11.9]     Hypertension [I10]     Hyperlipidemia [E78.5]     CAD (coronary artery disease) [I25.10]      BRBPR with anemia: no acute GIB noted inpatient with no need for endoscopy at this time  per GI. Continue to follow clinically for any reoccurrence. hgb stable    Acute metabolic encephalopathy: likely due to UTI, worsening of underlying dementia. Neuro assisted MRI  Brain-unable to obtain d/t sternal wires. Continue supportive care, avoid sedatives    ADRIAN on CKD : likely prerenal , had improved with IVF. nephro assisting. Cr uptrending . nephro assisting. Started on IVF. HD per nephro. Monitor and avoid nephrotoxins    UTI with possible acute mastoditis:UC grew skin/urogenital rick. Completed treatment with IV ceftriaxone and IV Zyvox, d/obey on 10/4. DMII with hypoglycemia: Lantus held with no further hypoglycemia. Continue SSI . Monitor and adjust insulin doses as needed    Cardiomyopathy : Ef 15-20% on echo. continue beta-blocker, hydralazine and nitrate combination. No ACE/ARB/ARNI/aldosterone antagonist due to renal insufficiency. CAD with elevated trop : cardio assisting . Stress test abnormal-EF 32%, large scar with no ischemia. Medical management recommended per cardio. Continue BB, aspirin, statin    Anemia: likely anemia of chronic disease due to CKD. on retacrit weekly      Urinary retention: persistent despite getting flomax and getting intermittent straight cath. UTI has been treated. Urology recs appreciated. Continue Ryan    DVT Prophylaxis: scds  Diet: ADULT DIET;  Regular; 4 carb choices (60 gm/meal)  Adult Oral Nutrition Supplement;  Renal Oral Supplement  Code Status: Full Code    PT/OT Eval Status: consulted    Dispo -HD planned 10/16, medically stable for discharge when SNF bed available    DANGELO Mcknight - CNP

## 2021-10-16 NOTE — PROGRESS NOTES
[facility]                                               Progress note    Summary:   Karie Avendaño is being seen by nephrology for Acute kidney injury (ADRIAN) on CKD-IV. Karie Avendaño is a 78 y.o. male presented to the hospital on 9/30/2021 with altered mental status from nursing facility. He is unable to provide details. Per reports he has had bright red blood per rectum. He was brought to the emergency room where work-up showed possible inferolateral ischemia on the heart and had QTc prolongation. CT scan of the head was done which was negative. He was found to have metabolic acidosis, ADRIAN. Also elevation of troponin. He had a UA done which showed possible infection,        Interval History  Started on HD here    Plan:   -  iHD today. - follow with clearance. - noted discharge planning, might be transitioning to F HD as outpatient. Ok to D/C after HD from our side. Ulisses Marino MD  [facility]  Office: [phone]    Assessment:   Acute Kidney Injury/ CKD 4- now ESRD  ADRIAN likely due to -prerenal etiology  Cr on consultation 3.3  Baseline Cr- 3  CKD likely due to diabetes hypertension recurrent ADRIAN  He was admitted to [facility] on 6/29- 7/8/21- with ADRIAN  Cr at DC was 3.38 from peak of 4.75(pre-renal)  UA- small blood, small nitrite, wbc 21-50  Renal Imaging:-10/21-right kidney 8.2 cm, left 8.3 cm, no hydronephrosis  Echo: 9/21-EF 15 to 02%, grade 2 diastolic dysfunction  0/00- nromal EF, RV pressure < 35 mmHg  Started HD this admission, will likely need long term.        Acute metabolic encephalopathy  f underlying dementia. Neuro assisted MRI  Brain-unable to obtain d/t sternal wires.  Continue supportive care, avoid sedatives     UTI   .  Completed treatment with IV ceftriaxone and IV Zyvox, d/obey on 10/4.     DMII         Cardiomyopathy   : Ef 15-20%     CAD   with elevated trop :     Medical management recommended per cardio.  Continue BB, aspirin, statin     Anemia:  likely anemia of chronic disease due to CKD. on retacrit weekly        Hypertension   BP at goal       Acidosis  Oral bicarb      BPH  Urinary retention   flomax    intermittent straight cath. History of renal stones  CAD  Carotid artery occlusion  Diabetes mellitus      ROS:   Positives Listed Bold. All other remaining systems are negative. Constitutional:  fever, chills, weakness, weight change, fatigue,      Skin:  rash, pruritus, hair loss, bruising, dry skin, petechiae. Head, Face, Neck   headaches, swelling,  cervical adenopathy. Respiratory: shortness of breath, cough, or wheezing  Cardiovascular: chest pain, palpitations, dizzy, edema  Gastrointestinal: nausea, vomiting, diarrhea, constipation,belly pain    Yellow skin, blood in stool  Musculoskeletal:  back pain, muscle weakness, gait problems,       joint pain or swelling. Genitourinary:  dysuria, poor urine flow, flank pain, blood in urine  Neurologic:  vertigo, TIA'S, syncope, seizures, focal weakness  Psychosocial:  insomnia, anxiety, or depression. Additional positive findings: -     PMH:   Past medical history, surgical history, social history, family history are reviewed and updated as appropriate. Reviewed current medication list.   Allergies reviewed and updated as needed. PE:   Vitals:    10/16/21 0415   BP: (!) 118/56   Pulse: 82   Resp: 16   Temp: 98.7 °F (37.1 °C)   SpO2: 98%       General appearance: Alert in NAD, Comfortable. HEENT: EOM intact, no icterus. Trachea is midline. Neck : No masses, appears symmetrical, no JVD  Respiratory: Respiratory effort appears normal, bilateral equal chest rise, no wheeze, no crackles  Cardiovascular: Ausculation shows RRR no edema  Abdomen: No visible mass or tenderness, non distended. Musculoskeletal:  Joints with no swelling or deformity. Skin:no rashes, ulcers, induration, no jaundice. Neuro: face symmetric, no focal deficits.       Lab Results Component Value Date    CREATININE 3.2 (H) 10/16/2021    BUN 23 (H) 10/16/2021     (L) 10/16/2021    K 3.5 10/16/2021    CL 97 (L) 10/16/2021    CO2 26 10/16/2021      Lab Results   Component Value Date    WBC 4.6 10/16/2021    HGB 7.5 (L) 10/16/2021    HCT 21.5 (L) 10/16/2021    MCV 92.9 10/16/2021    PLT 63 (L) 10/16/2021     Lab Results   Component Value Date    CALCIUM 8.3 10/16/2021

## 2021-10-16 NOTE — PLAN OF CARE
Problem: Falls - Risk of:  Goal: Will remain free from falls  Description: Will remain free from falls  10/15/2021 2327 by Rhonda Rangel RN  Outcome: Ongoing  10/15/2021 1519 by [facility]  Outcome: Ongoing  Note: Pt high fall risk. Instructed to use call light before getting out of bed and when in need of assistance. Call light within reach. Bed in low position. Bed alarm and nonskid footwear on. [employer] camera at bedside. Will continue to monitor.         Problem: Falls - Risk of:  Goal: Absence of physical injury  Description: Absence of physical injury  Outcome: Ongoing

## 2021-10-16 NOTE — CARE COORDINATION
Left vm re: possible admission and dialysis issues with [facility] this weekend. Await return call. ADDENDUM 2725: CM received message from [facility] stating that they still have to confirm plan with Thanh Almaz which will not happen till Monday.

## 2021-10-17 LAB
ANION GAP SERPL CALCULATED.3IONS-SCNC: 11 MMOL/L (ref 3–16)
BUN BLDV-MCNC: 14 MG/DL (ref 7–20)
CALCIUM SERPL-MCNC: 8.3 MG/DL (ref 8.3–10.6)
CHLORIDE BLD-SCNC: 93 MMOL/L (ref 99–110)
CO2: 26 MMOL/L (ref 21–32)
CREAT SERPL-MCNC: 2.3 MG/DL (ref 0.8–1.3)
GFR AFRICAN AMERICAN: 34
GFR NON-AFRICAN AMERICAN: 28
GLUCOSE BLD-MCNC: 126 MG/DL (ref 70–99)
GLUCOSE BLD-MCNC: 136 MG/DL (ref 70–99)
GLUCOSE BLD-MCNC: 143 MG/DL (ref 70–99)
GLUCOSE BLD-MCNC: 172 MG/DL (ref 70–99)
GLUCOSE BLD-MCNC: 229 MG/DL (ref 70–99)
HCT VFR BLD CALC: 23.3 % (ref 40.5–52.5)
HEMOGLOBIN: 8 G/DL (ref 13.5–17.5)
MCH RBC QN AUTO: 32.6 PG (ref 26–34)
MCHC RBC AUTO-ENTMCNC: 34.3 G/DL (ref 31–36)
MCV RBC AUTO: 95.1 FL (ref 80–100)
PDW BLD-RTO: 15.1 % (ref 12.4–15.4)
PERFORMED ON: ABNORMAL
PLATELET # BLD: 80 K/UL (ref 135–450)
PLATELET SLIDE REVIEW: ABNORMAL
PMV BLD AUTO: 10.5 FL (ref 5–10.5)
POTASSIUM SERPL-SCNC: 4.1 MMOL/L (ref 3.5–5.1)
RBC # BLD: 2.45 M/UL (ref 4.2–5.9)
SLIDE REVIEW: ABNORMAL
SODIUM BLD-SCNC: 130 MMOL/L (ref 136–145)
WBC # BLD: 4.4 K/UL (ref 4–11)

## 2021-10-17 PROCEDURE — 6370000000 HC RX 637 (ALT 250 FOR IP): Performed by: INTERNAL MEDICINE

## 2021-10-17 PROCEDURE — 85027 COMPLETE CBC AUTOMATED: CPT

## 2021-10-17 PROCEDURE — 80048 BASIC METABOLIC PNL TOTAL CA: CPT

## 2021-10-17 PROCEDURE — 2580000003 HC RX 258: Performed by: HOSPITALIST

## 2021-10-17 PROCEDURE — 6370000000 HC RX 637 (ALT 250 FOR IP): Performed by: HOSPITALIST

## 2021-10-17 PROCEDURE — 1200000000 HC SEMI PRIVATE

## 2021-10-17 PROCEDURE — 6370000000 HC RX 637 (ALT 250 FOR IP): Performed by: NURSE PRACTITIONER

## 2021-10-17 PROCEDURE — 36415 COLL VENOUS BLD VENIPUNCTURE: CPT

## 2021-10-17 RX ADMIN — SODIUM CHLORIDE, PRESERVATIVE FREE 10 ML: 5 INJECTION INTRAVENOUS at 08:52

## 2021-10-17 RX ADMIN — SODIUM CHLORIDE, PRESERVATIVE FREE 10 ML: 5 INJECTION INTRAVENOUS at 20:13

## 2021-10-17 RX ADMIN — ISOSORBIDE DINITRATE 10 MG: 10 TABLET ORAL at 20:10

## 2021-10-17 RX ADMIN — CARVEDILOL 18.75 MG: 6.25 TABLET, FILM COATED ORAL at 18:11

## 2021-10-17 RX ADMIN — INSULIN LISPRO 1 UNITS: 100 INJECTION, SOLUTION INTRAVENOUS; SUBCUTANEOUS at 20:19

## 2021-10-17 RX ADMIN — ISOSORBIDE DINITRATE 10 MG: 10 TABLET ORAL at 08:51

## 2021-10-17 RX ADMIN — INSULIN LISPRO 1 UNITS: 100 INJECTION, SOLUTION INTRAVENOUS; SUBCUTANEOUS at 18:13

## 2021-10-17 RX ADMIN — FAMOTIDINE 10 MG: 20 TABLET ORAL at 08:52

## 2021-10-17 RX ADMIN — SODIUM BICARBONATE 650 MG TABLET 1300 MG: at 08:51

## 2021-10-17 RX ADMIN — SODIUM BICARBONATE 650 MG TABLET 1300 MG: at 20:10

## 2021-10-17 RX ADMIN — ATORVASTATIN CALCIUM 20 MG: 10 TABLET, FILM COATED ORAL at 08:52

## 2021-10-17 RX ADMIN — Medication 2000 UNITS: at 08:51

## 2021-10-17 RX ADMIN — INSULIN LISPRO 1 UNITS: 100 INJECTION, SOLUTION INTRAVENOUS; SUBCUTANEOUS at 14:34

## 2021-10-17 RX ADMIN — TAMSULOSIN HYDROCHLORIDE 0.4 MG: 0.4 CAPSULE ORAL at 08:51

## 2021-10-17 RX ADMIN — SODIUM BICARBONATE 650 MG TABLET 1300 MG: at 14:33

## 2021-10-17 RX ADMIN — ISOSORBIDE DINITRATE 10 MG: 10 TABLET ORAL at 14:34

## 2021-10-17 RX ADMIN — ASPIRIN 81 MG: 81 TABLET, COATED ORAL at 08:51

## 2021-10-17 ASSESSMENT — PAIN SCALES - GENERAL
PAINLEVEL_OUTOF10: 0

## 2021-10-17 NOTE — PROGRESS NOTES
[facility]                                               Progress note    Summary:   Karie Avendaño is being seen by nephrology for Acute kidney injury (ADRIAN) on CKD-IV. Karie Avendaño is a 78 y.o. male presented to the hospital on 9/30/2021 with altered mental status from nursing facility. He is unable to provide details. Per reports he has had bright red blood per rectum. He was brought to the emergency room where work-up showed possible inferolateral ischemia on the heart and had QTc prolongation. CT scan of the head was done which was negative. He was found to have metabolic acidosis, ADRIAN. Also elevation of troponin. He had a UA done which showed possible infection,        Interval History  Tolerated HD well yesterday    Plan:   -Tolerated HD well yesterday. Follow with outpatient planning. May transition him to Monday Wednesday Friday. If needed for schedule      Ulisses Marino MD  [facility] Nephrology  Office: [phone]    Assessment:   Acute Kidney Injury/ CKD 4- now ESRD  ADRIAN likely due to -prerenal etiology  Cr on consultation 3.3  Baseline Cr- 3  CKD likely due to diabetes hypertension recurrent ADRIAN  He was admitted to [facility] on 6/29- 7/8/21- with ADRIAN  Cr at DC was 3.38 from peak of 4.75(pre-renal)  UA- small blood, small nitrite, wbc 21-50  Renal Imaging:-10/21-right kidney 8.2 cm, left 8.3 cm, no hydronephrosis  Echo: 9/21-EF 15 to 39%, grade 2 diastolic dysfunction  9/81- nromal EF, RV pressure < 35 mmHg  Started HD this admission, will likely need long term.        Acute metabolic encephalopathy  f underlying dementia. Neuro assisted MRI  Brain-unable to obtain d/t sternal wires.  Continue supportive care, avoid sedatives     UTI   .  Completed treatment with IV ceftriaxone and IV Zyvox, d/obey on 10/4.     DMII         Cardiomyopathy   : Ef 15-20%     CAD   with elevated trop :     Medical management recommended per cardio.  Continue BB, aspirin, statin     Anemia:  likely anemia of chronic disease due to CKD. on retacrit weekly        Hypertension   BP at goal       Acidosis  Oral bicarb      BPH  Urinary retention   flomax    intermittent straight cath. History of renal stones  CAD  Carotid artery occlusion  Diabetes mellitus      ROS:   Positives Listed Bold. All other remaining systems are negative. Constitutional:  fever, chills, weakness, weight change, fatigue,      Skin:  rash, pruritus, hair loss, bruising, dry skin, petechiae. Head, Face, Neck   headaches, swelling,  cervical adenopathy. Respiratory: shortness of breath, cough, or wheezing  Cardiovascular: chest pain, palpitations, dizzy, edema  Gastrointestinal: nausea, vomiting, diarrhea, constipation,belly pain    Yellow skin, blood in stool  Musculoskeletal:  back pain, muscle weakness, gait problems,       joint pain or swelling. Genitourinary:  dysuria, poor urine flow, flank pain, blood in urine  Neurologic:  vertigo, TIA'S, syncope, seizures, focal weakness  Psychosocial:  insomnia, anxiety, or depression. Additional positive findings: -     PMH:   Past medical history, surgical history, social history, family history are reviewed and updated as appropriate. Reviewed current medication list.   Allergies reviewed and updated as needed. PE:   Vitals:    10/17/21 0445   BP: 114/63   Pulse: 77   Resp: 16   Temp: 97.7 °F (36.5 °C)   SpO2: 97%       General appearance: Alert in NAD, Comfortable. HEENT: EOM intact, no icterus. Trachea is midline. Neck : No masses, appears symmetrical, no JVD  Respiratory: Respiratory effort appears normal, bilateral equal chest rise, no wheeze, no crackles  Cardiovascular: Ausculation shows RRR no edema  Abdomen: No visible mass or tenderness, non distended. Musculoskeletal:  Joints with no swelling or deformity. Skin:no rashes, ulcers, induration, no jaundice. Neuro: face symmetric, no focal deficits.       Lab Results   Component Value Date    CREATININE 2.3 (H) 10/17/2021    BUN 14 10/17/2021     (L) 10/17/2021    K 4.1 10/17/2021    CL 93 (L) 10/17/2021    CO2 26 10/17/2021      Lab Results   Component Value Date    WBC 4.6 10/16/2021    HGB 7.5 (L) 10/16/2021    HCT 21.5 (L) 10/16/2021    MCV 92.9 10/16/2021    PLT 63 (L) 10/16/2021     Lab Results   Component Value Date    CALCIUM 8.3 10/17/2021

## 2021-10-17 NOTE — PROGRESS NOTES
For patient safety, an [employer] camera has been placed in patient's room. [employer] monitoring needed for confusion, pulling at lines and tubes and fall risk. Writer placed call to monitor observer to verify camera number 7 and review patient name, reason for monitoring, and contact information for primary RN. Patient and Legal Guardian notified of use of remote monitoring upon implementation of camera and verbalized understanding.

## 2021-10-17 NOTE — PLAN OF CARE
Avysis and bed alarms in  Place to notify staff of attempts of unassisted transfers. Fall precautions in place. Call light within reach.  Pt able and agreeable to call staff for assistance appropriately

## 2021-10-17 NOTE — PROGRESS NOTES
Patient found on knees beside bed, Dr. Ruba Martinez and Yandy Parmar made aware, no apparent injury.

## 2021-10-17 NOTE — PLAN OF CARE
Problem: Falls - Risk of:  Goal: Will remain free from falls  Description: Will remain free from falls  10/17/2021 0946 by Gera Garcia RN  Outcome: Ongoing     Problem: Falls - Risk of:  Goal: Absence of physical injury  Description: Absence of physical injury  Outcome: Ongoing

## 2021-10-17 NOTE — CARE COORDINATION
Reviewed plan for dc as it appears in chart at this time with son Juanis Arriaza. Juanis Arriaza indicates he is seriously considering stopping dialysis, signing up with [facility], and returning pt to [facility]. States he will decide likely tomorrow after another dialysis. He states that he has been paying to hold pt bed at [facility] in the event this is what they choose. Otherwise pt is pending approval for [facility].

## 2021-10-18 LAB
ANION GAP SERPL CALCULATED.3IONS-SCNC: 12 MMOL/L (ref 3–16)
BUN BLDV-MCNC: 23 MG/DL (ref 7–20)
CALCIUM SERPL-MCNC: 8.2 MG/DL (ref 8.3–10.6)
CHLORIDE BLD-SCNC: 96 MMOL/L (ref 99–110)
CO2: 27 MMOL/L (ref 21–32)
CREAT SERPL-MCNC: 2.9 MG/DL (ref 0.8–1.3)
GFR AFRICAN AMERICAN: 26
GFR NON-AFRICAN AMERICAN: 21
GLUCOSE BLD-MCNC: 154 MG/DL (ref 70–99)
GLUCOSE BLD-MCNC: 161 MG/DL (ref 70–99)
GLUCOSE BLD-MCNC: 258 MG/DL (ref 70–99)
GLUCOSE BLD-MCNC: 260 MG/DL (ref 70–99)
GLUCOSE BLD-MCNC: 264 MG/DL (ref 70–99)
PERFORMED ON: ABNORMAL
POTASSIUM SERPL-SCNC: 3.8 MMOL/L (ref 3.5–5.1)
SODIUM BLD-SCNC: 135 MMOL/L (ref 136–145)

## 2021-10-18 PROCEDURE — 6370000000 HC RX 637 (ALT 250 FOR IP): Performed by: INTERNAL MEDICINE

## 2021-10-18 PROCEDURE — 97116 GAIT TRAINING THERAPY: CPT

## 2021-10-18 PROCEDURE — 1200000000 HC SEMI PRIVATE

## 2021-10-18 PROCEDURE — 97530 THERAPEUTIC ACTIVITIES: CPT

## 2021-10-18 PROCEDURE — 6370000000 HC RX 637 (ALT 250 FOR IP): Performed by: HOSPITALIST

## 2021-10-18 PROCEDURE — 36415 COLL VENOUS BLD VENIPUNCTURE: CPT

## 2021-10-18 PROCEDURE — 80048 BASIC METABOLIC PNL TOTAL CA: CPT

## 2021-10-18 PROCEDURE — 97535 SELF CARE MNGMENT TRAINING: CPT

## 2021-10-18 PROCEDURE — 2580000003 HC RX 258: Performed by: HOSPITALIST

## 2021-10-18 PROCEDURE — 6370000000 HC RX 637 (ALT 250 FOR IP): Performed by: NURSE PRACTITIONER

## 2021-10-18 RX ADMIN — FAMOTIDINE 10 MG: 20 TABLET ORAL at 10:19

## 2021-10-18 RX ADMIN — INSULIN LISPRO 3 UNITS: 100 INJECTION, SOLUTION INTRAVENOUS; SUBCUTANEOUS at 12:48

## 2021-10-18 RX ADMIN — SODIUM BICARBONATE 650 MG TABLET 1300 MG: at 15:15

## 2021-10-18 RX ADMIN — TAMSULOSIN HYDROCHLORIDE 0.4 MG: 0.4 CAPSULE ORAL at 10:19

## 2021-10-18 RX ADMIN — Medication 2000 UNITS: at 10:18

## 2021-10-18 RX ADMIN — CARVEDILOL 18.75 MG: 6.25 TABLET, FILM COATED ORAL at 18:05

## 2021-10-18 RX ADMIN — ASPIRIN 81 MG: 81 TABLET, COATED ORAL at 10:21

## 2021-10-18 RX ADMIN — INSULIN LISPRO 3 UNITS: 100 INJECTION, SOLUTION INTRAVENOUS; SUBCUTANEOUS at 18:05

## 2021-10-18 RX ADMIN — ATORVASTATIN CALCIUM 20 MG: 10 TABLET, FILM COATED ORAL at 10:19

## 2021-10-18 RX ADMIN — SODIUM BICARBONATE 650 MG TABLET 1300 MG: at 21:20

## 2021-10-18 RX ADMIN — INSULIN LISPRO 2 UNITS: 100 INJECTION, SOLUTION INTRAVENOUS; SUBCUTANEOUS at 21:22

## 2021-10-18 RX ADMIN — SODIUM CHLORIDE, PRESERVATIVE FREE 10 ML: 5 INJECTION INTRAVENOUS at 21:19

## 2021-10-18 RX ADMIN — ISOSORBIDE DINITRATE 10 MG: 10 TABLET ORAL at 15:13

## 2021-10-18 RX ADMIN — SODIUM BICARBONATE 650 MG TABLET 1300 MG: at 10:19

## 2021-10-18 ASSESSMENT — PAIN SCALES - GENERAL
PAINLEVEL_OUTOF10: 0

## 2021-10-18 ASSESSMENT — PAIN SCALES - WONG BAKER
WONGBAKER_NUMERICALRESPONSE: 0
WONGBAKER_NUMERICALRESPONSE: 0

## 2021-10-18 NOTE — PROGRESS NOTES
Hospitalist Progress Note      PCP: Memory Rm    Date of Admission: 9/30/2021    Chief Complaint: [address] Course: H&P reviewed     Subjective:  Sitting in the chair  Had breakfast  Unable to see well  Able to answer simple questions  Denies chest pain shortness of breath fever cough constipation, diarrhea or urinary complaints but he has a Ryan    Medications:  Reviewed    Infusion Medications    sodium chloride Stopped (10/02/21 0344)    dextrose       Scheduled Medications    famotidine  10 mg Oral Daily    epoetin long-epbx  1,000 Units SubCUTAneous Weekly    carvedilol  18.75 mg Oral BID WC    sodium bicarbonate  1,300 mg Oral TID    [Held by provider] hydrALAZINE  10 mg Oral 3 times per day    insulin lispro  0-6 Units SubCUTAneous TID WC    insulin lispro  0-3 Units SubCUTAneous Nightly    tamsulosin  0.4 mg Oral Daily    isosorbide dinitrate  10 mg Oral TID    [Held by provider] insulin glargine  14 Units SubCUTAneous Nightly    atorvastatin  20 mg Oral Daily    aspirin  81 mg Oral Daily    sodium chloride flush  10 mL IntraVENous 2 times per day    Vitamin D  2,000 Units Oral Daily     PRN Meds: sodium chloride flush, sodium chloride, magnesium sulfate, promethazine **OR** ondansetron, senna, perflutren lipid microspheres, glucose, dextrose, glucagon (rDNA), dextrose      Intake/Output Summary (Last 24 hours) at 10/18/2021 0800  Last data filed at 10/17/2021 1448  Gross per 24 hour   Intake 40 ml   Output --   Net 40 ml       Physical Exam Performed:    BP (!) 103/55   Pulse 73   Temp 97.4 °F (36.3 °C) (Axillary)   Resp 16   Ht 5' 8\" (1.727 m)   Wt 113 lb 5.1 oz (51.4 kg)   SpO2 99%   BMI 17.23 kg/m²      General appearance:  frail/gen weak, no apparent distress, appears stated age and cooperative. Confused. Legally blind  Neck: Supple, with full range of motion. No jugular venous distention. Trachea midline.   Dialysis catheter in the right side of the neck  Respiratory:  Normal respiratory effort. Reduced air entry, bilaterally without Rales/Wheezes/Rhonchi. Cardiovascular: Regular rate and rhythm with normal S1/S2 without murmurs, rubs or gallops. Abdomen: Soft, non-tender, non-distended with normal bowel sounds. Musculoskeletal: No edema,  Skin:  Warm and dry, pale  Neurologic:  Legally blind otherwise Neurovascularly intact without any focal /motor deficits. Psychiatric: Alert,  limited insight       Labs:   Recent Labs     10/16/21  0643 10/17/21  0544   WBC 4.6 4.4   HGB 7.5* 8.0*   HCT 21.5* 23.3*   PLT 63* 80*     Recent Labs     10/16/21  0643 10/17/21  0545 10/18/21  0524   * 130* 135*   K 3.5 4.1 3.8   CL 97* 93* 96*   CO2 26 26 27   BUN 23* 14 23*   CREATININE 3.2* 2.3* 2.9*   CALCIUM 8.3 8.3 8.2*     No results for input(s): AST, ALT, BILIDIR, BILITOT, ALKPHOS in the last 72 hours. No results for input(s): INR in the last 72 hours. No results for input(s): Adonica Hoose in the last 72 hours. Urinalysis:      Lab Results   Component Value Date    NITRU Negative 09/30/2021    WBCUA 21-50 09/30/2021    BACTERIA 3+ 09/30/2021    RBCUA 5-10 09/30/2021    BLOODU SMALL 09/30/2021    SPECGRAV 1.015 09/30/2021    GLUCOSEU Negative 09/30/2021       Radiology:  IR REPLACE TUNNELED CVC WO SQ PORT/PUMP SAME ACCESS   Final Result   1. Tunneled dialysis catheter working appropriately. 2. Pursestring suture at the catheter insertion site, to be removed in 2 days. IR TUNNELED CVC PLACE WO SQ PORT/PUMP > 5 YEARS   Final Result   Successful ultrasound and fluoroscopy guided tunneled catheter placement with   catheter tip in the right atrium. NM Cardiac Stress Test Nuclear Imaging   Final Result      VL Extremity Venous Left   Final Result      XR CHEST PORTABLE   Final Result   1. Congestive heart failure. US RETROPERITONEAL COMPLETE   Final Result   Grossly unremarkable ultrasound of the kidneys and urinary bladder.   No hydronephrosis. CT HEAD WO CONTRAST   Final Result   No acute intracranial abnormality. Small right mastoid effusion                 Assessment/Plan:    Active Hospital Problems    Diagnosis     Elevated troponin [R77.8]     Cardiomyopathy ([facility] [address] [I42.9]     Normocytic anemia [D64.9]     Acute renal failure superimposed on chronic kidney disease (HCC) [N17.9, N18.9]     Acute UTI [N39.0]     Acute encephalopathy [G93.40]     DM (diabetes mellitus), type 2 ([facility] [address] [E11.9]     Hypertension [I10]     Hyperlipidemia [E78.5]     CAD (coronary artery disease) [I25.10]      BRBPR with anemia: no acute GIB noted inpatient with no need for endoscopy at this time  per GI. Continue to follow clinically for any reoccurrence. hgb stable    Acute metabolic encephalopathy: likely due to UTI, worsening of underlying dementia. Neuro assisted MRI  Brain-unable to obtain d/t sternal wires. Continue supportive care, avoid sedatives    ADRIAN on CKD : likely prerenal , had improved with IVF. nephro assisting. Cr uptrending . nephro assisting. Started on IVF. HD per nephro. Monitor     UTI with possible acute mastoditis:UC grew skin/urogenital rick. Completed treatment with IV ceftriaxone and IV Zyvox, d/obey on 10/4. DMII with hypoglycemia: Lantus held with no further hypoglycemia. Continue SSI . Monitor and adjust insulin doses as needed    Cardiomyopathy : Ef 15-20% on echo. continue beta-blocker, hydralazine and nitrate combination. No ACE/ARB/ARNI/aldosterone antagonist due to renal insufficiency. CAD with elevated trop : cardio assisting . Stress test abnormal-EF 32%, large scar with no ischemia. Medical management recommended per cardio. Continue BB, aspirin, statin    Anemia: likely anemia of chronic disease due to CKD. on retacrit weekly      Urinary retention: persistent despite getting flomax and getting intermittent straight cath. UTI has been treated. Urology recs appreciated.   Continue Connor    DVT Prophylaxis: scds  Diet: ADULT DIET; Regular; 4 carb choices (60 gm/meal)  Adult Oral Nutrition Supplement;  Renal Oral Supplement  Code Status: Full Code    PT/OT Eval Status: consulted for skilled nursing facility    Dispo -family is still deciding about future goals hospice versus aggressive care  He has a place at [facility] if they choose hospice  If patient need hemodialysis patient supposed to go for [facility] but it takes some time to get approval  Discussed with     Sherice Smith MD

## 2021-10-18 NOTE — PROGRESS NOTES
[facility]                                               Progress note    Summary:   Aiden Mckinney is being seen by nephrology for Acute kidney injury (ADRIAN) on CKD-IV. Aiden Mckinney is a 78 y.o. male presented to the hospital on 9/30/2021 with altered mental status from nursing facility. He is unable to provide details. Per reports he has had bright red blood per rectum. He was brought to the emergency room where work-up showed possible inferolateral ischemia on the heart and had QTc prolongation. CT scan of the head was done which was negative. He is scheduled to do MRI but has not done yet. He was found to have metabolic acidosis, ADRIAN. Also elevation of troponin. He had a UA done which showed possible infection, culture is pending also blood culture is sent and results are pending. He is currently on IV antibiotics.       Interval History  Last dialyzed on Saturday. Tolerated well. Noted plans for potentially stopping dialysis and opting for hospice care. BP soft today but ok. Plan:   - plan on iHD tomorrow. - no acute indications for RRT today. Jenniffer Cronin MD  [facility]  Office: [phone]    Assessment:   Acute Kidney Injury/ CKD 4- now ESRD  ADRIAN likely due to -prerenal etiology  Cr on consultation 3.3  Baseline Cr- 3  CKD likely due to diabetes hypertension recurrent ADRIAN  He was admitted to [facility] on 6/29- 7/8/21- with ADRIAN  Cr at [place] was 3.38 from peak of 4.75(pre-renal)  UA- small blood, small nitrite, wbc 21-50  Renal Imaging:-10/21-right kidney 8.2 cm, left 8.3 cm, no hydronephrosis  Echo: 9/21-EF 15 to 11%, grade 2 diastolic dysfunction  7/07- nromal EF, RV pressure < 35 mmHg  Started HD this admission, will likely need long term.        Acute metabolic encephalopathy  f underlying dementia. Neuro assisted MRI  Brain-unable to obtain d/t sternal wires.  Continue supportive care, avoid sedatives     UTI   .  Completed treatment with IV ceftriaxone and IV Zyvox, d/obey on 10/4.     DMII         Cardiomyopathy   : Ef 15-20%     CAD   with elevated trop :     Medical management recommended per cardio.  Continue BB, aspirin, statin     Anemia:  likely anemia of chronic disease due to CKD. on retacrit weekly        Hypertension   BP at goal       Acidosis  Oral bicarb      BPH  Urinary retention   flomax    intermittent straight cath. History of renal stones  CAD  Carotid artery occlusion  Diabetes mellitus      ROS:   Positives Listed Bold. All other remaining systems are negative. Constitutional:  fever, chills, weakness, weight change, fatigue,      Skin:  rash, pruritus, hair loss, bruising, dry skin, petechiae. Head, Face, Neck   headaches, swelling,  cervical adenopathy. Respiratory: shortness of breath, cough, or wheezing  Cardiovascular: chest pain, palpitations, dizzy, edema  Gastrointestinal: nausea, vomiting, diarrhea, constipation,belly pain    Yellow skin, blood in stool  Musculoskeletal:  back pain, muscle weakness, gait problems,       joint pain or swelling. Genitourinary:  dysuria, poor urine flow, flank pain, blood in urine  Neurologic:  vertigo, TIA'S, syncope, seizures, focal weakness  Psychosocial:  insomnia, anxiety, or depression. Additional positive findings: -     PMH:   Past medical history, surgical history, social history, family history are reviewed and updated as appropriate. Reviewed current medication list.   Allergies reviewed and updated as needed. PE:   Vitals:    10/18/21 1015   BP: (!) 109/49   Pulse: 74   Resp: 16   Temp: 97.5 °F (36.4 °C)   SpO2: 91%       General appearance: Alert in NAD, Comfortable. HEENT: EOM intact, no icterus. Trachea is midline. Neck : No masses, appears symmetrical, no JVD  Respiratory: Respiratory effort appears normal, bilateral equal chest rise, no wheeze, no crackles  Cardiovascular:  Ausculation shows RRR no edema  Abdomen: No visible mass or tenderness, non distended. Musculoskeletal:  Joints with no swelling or deformity. Skin:no rashes, ulcers, induration, no jaundice. Neuro: face symmetric, no focal deficits.       Lab Results   Component Value Date    CREATININE 2.9 (H) 10/18/2021    BUN 23 (H) 10/18/2021     (L) 10/18/2021    K 3.8 10/18/2021    CL 96 (L) 10/18/2021    CO2 27 10/18/2021      Lab Results   Component Value Date    WBC 4.4 10/17/2021    HGB 8.0 (L) 10/17/2021    HCT 23.3 (L) 10/17/2021    MCV 95.1 10/17/2021    PLT 80 (L) 10/17/2021     Lab Results   Component Value Date    CALCIUM 8.2 (L) 10/18/2021

## 2021-10-18 NOTE — CARE COORDINATION
JUDIT called and spoke with son, Hansa Simmons, regarding plan of care. felix states his brother is coming into town today, to discuss patient's goals of care. Hansa Simmons voiced knowledge of his father's mental status being unchanged, despite HD. Still unclear if he would like pt to continue HD and d/c to [facility] (potentially staying LTC) or discontinuing HD and returning with [facility] with hospice care. JUDIT provided Felix with direct contact for [facility] and encouraged him to call when decision had been made. Per Felix's request, JUDIT did reach out to [facility] central admissions team to inquire if LTC bed was available.  JUDIT also inquired with Lennette Crigler in admissions if approval was obtained from [facility] for in house dialysis (was sent Friday, Oct 15)

## 2021-10-18 NOTE — PROGRESS NOTES
Physical Therapy  Facility/Department: [facility] B3 - MED SURG  Daily Treatment Note  NAME: Reshma De Guzman  : 1945  MRN: [id number]    Date of Service: 10/18/2021    Discharge Recommendations:  Subacute/Skilled Nursing Facility   PT Equipment Recommendations  Equipment Needed: No    Assessment   Body structures, Functions, Activity limitations: Decreased functional mobility ; Decreased safe awareness;Decreased balance;Decreased endurance;Decreased strength;Decreased cognition;Decreased posture  Assessment: Pt continues to require mod A for sit-stands and ambulation with RW. Pt required increased time for intiating mobility this session. Will continue to progress mobility as pt tolerates. Recommend SNF for continued therapy. Treatment Diagnosis: weakness, decreased mobility, decreased cognition  Prognosis: Good  PT Education: Goals;PT Role;Plan of Care;General Safety; Disease Specific Education;Gait Training;Transfer Training;Orientation; Functional Mobility Training;Equipment;Precautions  Patient Education: Pt verbalized understanding but will require reinforcement  REQUIRES PT FOLLOW UP: Yes  Activity Tolerance  Activity Tolerance: Patient Tolerated treatment well;Patient limited by cognitive status  Activity Tolerance: /79, HR 79, SpO2 96%     Patient Diagnosis(es): The primary encounter diagnosis was Acute renal failure superimposed on chronic kidney disease, unspecified CKD stage, unspecified acute renal failure type ([address] Diagnoses of Urinary tract infection without hematuria, site unspecified and Altered mental status, unspecified altered mental status type were also pertinent to this visit. 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    has a past surgical history that includes Cardiac surgery (2006); Lung surgery (2006); Kidney stone surgery (1996); Eye surgery (Bilateral, 2003); Colonoscopy; and IR TUNNELED CVC PLACE WO SQ PORT/PUMP > 5 YEARS (10/11/2021). Restrictions  Restrictions/Precautions  Restrictions/Precautions: Up as Tolerated, General Precautions, Fall Risk  Position Activity Restriction  Other position/activity restrictions: AvaSys monitor in room, pt is legally blind, gonzalez  Subjective   General  Chart Reviewed: Yes  Family / Caregiver Present: No  Referring Practitioner: Meagan Dukes DO  Subjective  Subjective: Pt agreeble to therapy with ecouragement. Pt confused and wanting to talk about electrical things and wondering how all of this was related to electrical.  General Comment  Comments: Pt supine in bed upon arrival. RN cleared pt for therapy  Pain Screening  Patient Currently in Pain: Denies  Vital Signs  Patient Currently in Pain: Denies       Objective   Bed mobility  Supine to Sit: Moderate assistance  Sit to Supine: Unable to assess (up in chair at end of session)  Transfers  Sit to Stand: Minimal Assistance; Moderate Assistance (from EOB and x2 trials)  Stand to sit: Minimal Assistance  Ambulation  Ambulation?: Yes  Ambulation 1  Surface: level tile  Device: Rolling Walker  Assistance: Minimal assistance; Moderate assistance;2 Person assistance  Quality of Gait: shortened step lengths bilaterally, full guidance and negoitation of walker for balance; cues d/t low vision; max encouragement to complete ambulation  Distance: 40'     Balance  Sitting - Static: Good;-  Sitting - Dynamic: Fair;+  Standing - Static: Fair  Standing - Dynamic: Fair  Comments: Standing balance with walker min/mod A while ambulating    AM-PAC Score  AM-PAC Inpatient Mobility Raw Score : 13 (10/18/21 0936)  AM-PAC Inpatient T-Scale Score : 36.74 (10/18/21 0936)  Mobility Inpatient CMS 0-100% Score: 64.91 (10/18/21 0936)  Mobility Inpatient CMS G-Code Modifier : CL (10/18/21 7007) Goals  Short term goals  Time Frame for Short term goals: 1 week (10/7) unless otherwise specified - goal time frame extended x 1 week to 10/14/21 secondary to longer-than-expected LOS; extended again to 10/21  Short term goal 1: pt to perform bed mobility with supervision; 10/15 - Mod Ax1  Short term goal 2: pt to perform transfers with CGA x 1; 10/15 - min A <>Mod A x1 with RW  Short term goal 3: pt to amb with least restrictive or no device x 50 feet with CGA x 1; 10/15: 30 feet with RW  Short term goal 4: pt to participate in LE Ex 8-10 reps by 10/5 - MET 10/06/21; 10/11 continue, progressing, continue  Patient Goals   Patient goals : Pt is confused, does not state goals when asked    Plan    Plan  Times per week: 3-5x/week in acute care  Times per day: Daily  Specific instructions for Next Treatment: Progress ther ex and mobility as tolerated  Current Treatment Recommendations: Strengthening, Transfer Training, Endurance Training, Cognitive Reorientation, Balance Training, Gait Training, Functional Mobility Training, Safety Education & Training, Neuromuscular Re-education, Home Exercise Program, Patient/Caregiver Education & Training, Positioning  Safety Devices  Type of devices:  All fall risk precautions in place, Gait belt, Patient at risk for falls, Nurse notified, Call light within reach, Left in chair, Telesitter in use, Chair alarm in place  Restraints  Initially in place: No     Therapy Time   Individual Concurrent Group Co-treatment   Time In 0805         Time Out 0829         Minutes 24         Timed Code Treatment Minutes: [address],   [facility]

## 2021-10-18 NOTE — PROGRESS NOTES
Occupational Therapy  Facility/Department: [facility] B3 - MED SURG  Daily Treatment Note  NAME: Madison Duane  : 1945  MRN: [id number]    Date of Service: 10/18/2021    Discharge Recommendations:  Subacute/Skilled Nursing Facility       Assessment   Performance deficits / Impairments: Decreased functional mobility ; Decreased ADL status; Decreased cognition;Decreased safe awareness;Decreased strength;Decreased endurance;Decreased balance;Decreased high-level IADLs;Decreased fine motor control;Decreased vision/visual deficit; Decreased coordination  Assessment: Pt seen for cotx with PT d/t assist level, cognition and decreased activity tolerance and to promote increased outcomes vs 1:1 session. Pt confused with poor STM noted, difficult to redirect, minimal active participation noted with ADls and bed mobility. Pt requires min/mod A x 2 for sit to stands and for functional mobility with RW, d/t posterior lean, poor vision and cognition. Pt requires assist to maneuver RW. Confused with ADL tasks. Decrease OT frequency to 2-3x/wk d/t lack of progress. Cont to recommend SNF at discharge. OT Education: OT Role;Plan of Care;Transfer Training;Orientation;Home Exercise Program  Patient Education: not an independent learner  Barriers to Learning: cognition  REQUIRES OT FOLLOW UP: Yes  Activity Tolerance  Activity Tolerance: Treatment limited secondary to decreased cognition  Activity Tolerance: 112/59 HR 78 O2 96% on RA  Safety Devices  Safety Devices in place: Yes  Type of devices: Left in chair;Chair alarm in place;Call light within reach;Nurse notified;Gait belt (avasys)         Patient Diagnosis(es): The primary encounter diagnosis was Acute renal failure superimposed on chronic kidney disease, unspecified CKD stage, unspecified acute renal failure type ([facility] [address]  Diagnoses of Urinary tract infection without hematuria, site unspecified and Altered mental status, unspecified altered mental status type were also pertinent to this visit. 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 has a past surgical history that includes Cardiac surgery (2006); Lung surgery (2006); Kidney stone surgery (1996); Eye surgery (Bilateral, 2003); Colonoscopy; and IR TUNNELED CVC PLACE WO SQ PORT/PUMP > 5 YEARS (10/11/2021). Restrictions  Restrictions/Precautions  Restrictions/Precautions: Up as Tolerated, General Precautions, Fall Risk  Position Activity Restriction  Other position/activity restrictions: AvaSys monitor in room, pt is legally blind, gonzalez  Subjective   General  Chart Reviewed: Yes  Patient assessed for rehabilitation services?: Yes  Response to previous treatment: Patient with no complaints from previous session  Family / Caregiver Present: No  Subjective  Subjective: Pt supine, awakens to name, encouragement to participate  General Comment  Comments: RN clears for therapy  Pre Treatment Pain Screening  Intervention List: Patient able to continue with treatment  Vital Signs  Patient Currently in Pain: Denies   Orientation  Orientation  Overall Orientation Status: Impaired  Orientation Level: Oriented to person;Disoriented to time;Disoriented to place; Disoriented to situation  Objective    ADL  LE Dressing: Dependent/Total  Toileting: Dependent/Total (gonzalez)  Additional Comments: pt confused, difficulty following commands this date, poor participation in ADl tasks        Balance  Sitting Balance: Contact guard assistance  Standing Balance:  Moderate assistance  Standing Balance  Time: 1-2 min  Activity: functional mobility in room with RW and assist  Comment: min/mod A x 2 for transfers and functional mobility  Functional Mobility  Activity: Other  Assist Level: Dependent/Total  Functional Mobility Comments: min/mod A x 2     Transfers  Sit to stand: Moderate assistance;2 Person assistance;Minimal assistance  Stand to sit: Moderate assistance;2 Person assistance;Minimal assistance  Transfer Comments: min/mod A x 2                       Cognition  Overall Cognitive Status: Exceptions  Arousal/Alertness: Delayed responses to stimuli  Following Commands: Follows one step commands with repetition; Follows one step commands with increased time  Attention Span: Attends with cues to redirect; Difficulty attending to directions  Memory: Decreased recall of biographical Information;Decreased short term memory;Decreased recall of recent events;Decreased recall of precautions;Decreased long term memory  Safety Judgement: Decreased awareness of need for safety  Problem Solving: Decreased awareness of errors  Insights: Not aware of deficits  Initiation: Requires cues for all  Sequencing: Requires cues for all  Cognition Comment: pt confused with very poor STM this date        Plan   Plan  Times per week: 2-3x/wk  Specific instructions for Next Treatment: cotx  Current Treatment Recommendations: Balance Training, Functional Mobility Training, Safety Education & Training, Cognitive Reorientation, Self-Care / ADL, Patient/Caregiver Education & Training, Home Management Training, Endurance Training    AM-PAC Score        AM-[facility] Inpatient Daily Activity Raw Score: 8 (10/18/21 0916)  AM-PAC Inpatient ADL T-Scale Score : 22.86 (10/18/21 0916)  ADL Inpatient CMS 0-100% Score: 85.69 (10/18/21 0916)  ADL Inpatient CMS G-Code Modifier : CM (10/18/21 4708)    Goals  Short term goals  Time Frame for Short term goals: 1 week by 10/8  Short term goal 1: Pt will complete toileting with Mod A by 10/4-- GOAL NOT MET, dependent 10/18/21  Short term goal 2: Pt will complete LBD with Mod  A-- GOAL NOT MET, total  10/18/21  Short term goal 3: Pt will complete grooming seated EOB with Min A-- GOAL NOT MET, NT 10/18/21  Short term goal 4: Pt will complete UBD seated EOB

## 2021-10-19 ENCOUNTER — APPOINTMENT (OUTPATIENT)
Dept: INTERVENTIONAL RADIOLOGY/VASCULAR | Age: 76
DRG: 673 | End: 2021-10-19
Payer: MEDICARE

## 2021-10-19 LAB
ANION GAP SERPL CALCULATED.3IONS-SCNC: 12 MMOL/L (ref 3–16)
BUN BLDV-MCNC: 32 MG/DL (ref 7–20)
CALCIUM SERPL-MCNC: 8.8 MG/DL (ref 8.3–10.6)
CHLORIDE BLD-SCNC: 94 MMOL/L (ref 99–110)
CO2: 28 MMOL/L (ref 21–32)
CREAT SERPL-MCNC: 3.7 MG/DL (ref 0.8–1.3)
GFR AFRICAN AMERICAN: 20
GFR NON-AFRICAN AMERICAN: 16
GLUCOSE BLD-MCNC: 170 MG/DL (ref 70–99)
GLUCOSE BLD-MCNC: 176 MG/DL (ref 70–99)
GLUCOSE BLD-MCNC: 202 MG/DL (ref 70–99)
GLUCOSE BLD-MCNC: 227 MG/DL (ref 70–99)
GLUCOSE BLD-MCNC: 254 MG/DL (ref 70–99)
GLUCOSE BLD-MCNC: 507 MG/DL (ref 70–99)
PERFORMED ON: ABNORMAL
POTASSIUM SERPL-SCNC: 4.4 MMOL/L (ref 3.5–5.1)
SODIUM BLD-SCNC: 134 MMOL/L (ref 136–145)

## 2021-10-19 PROCEDURE — 6370000000 HC RX 637 (ALT 250 FOR IP): Performed by: INTERNAL MEDICINE

## 2021-10-19 PROCEDURE — 36589 REMOVAL TUNNELED CV CATH: CPT

## 2021-10-19 PROCEDURE — 02HV33Z INSERTION OF INFUSION DEVICE INTO SUPERIOR VENA CAVA, PERCUTANEOUS APPROACH: ICD-10-PCS | Performed by: INTERNAL MEDICINE

## 2021-10-19 PROCEDURE — 6370000000 HC RX 637 (ALT 250 FOR IP): Performed by: HOSPITALIST

## 2021-10-19 PROCEDURE — 36415 COLL VENOUS BLD VENIPUNCTURE: CPT

## 2021-10-19 PROCEDURE — 6370000000 HC RX 637 (ALT 250 FOR IP): Performed by: NURSE PRACTITIONER

## 2021-10-19 PROCEDURE — 80048 BASIC METABOLIC PNL TOTAL CA: CPT

## 2021-10-19 PROCEDURE — 1200000000 HC SEMI PRIVATE

## 2021-10-19 PROCEDURE — 2580000003 HC RX 258: Performed by: HOSPITALIST

## 2021-10-19 PROCEDURE — 77001 FLUOROGUIDE FOR VEIN DEVICE: CPT

## 2021-10-19 RX ORDER — ISOSORBIDE DINITRATE 10 MG/1
10 TABLET ORAL 3 TIMES DAILY
Qty: 90 TABLET | Refills: 3 | Status: SHIPPED | OUTPATIENT
Start: 2021-10-19

## 2021-10-19 RX ORDER — MORPHINE SULFATE 100 MG/5ML
5 SOLUTION ORAL EVERY 4 HOURS PRN
Qty: 15 ML | Refills: 0 | Status: SHIPPED | OUTPATIENT
Start: 2021-10-19 | End: 2021-10-22

## 2021-10-19 RX ORDER — CARVEDILOL 6.25 MG/1
18.75 TABLET ORAL 2 TIMES DAILY WITH MEALS
Qty: 60 TABLET | Refills: 3 | Status: SHIPPED | OUTPATIENT
Start: 2021-10-19

## 2021-10-19 RX ORDER — FAMOTIDINE 10 MG
10 TABLET ORAL DAILY
Qty: 60 TABLET | Refills: 3 | Status: SHIPPED | OUTPATIENT
Start: 2021-10-20

## 2021-10-19 RX ORDER — TAMSULOSIN HYDROCHLORIDE 0.4 MG/1
0.4 CAPSULE ORAL DAILY
Qty: 30 CAPSULE | Refills: 3 | Status: SHIPPED | OUTPATIENT
Start: 2021-10-20

## 2021-10-19 RX ORDER — LORAZEPAM 2 MG/ML
0.5 CONCENTRATE ORAL EVERY 4 HOURS PRN
Qty: 5 ML | Refills: 0 | Status: SHIPPED | OUTPATIENT
Start: 2021-10-19 | End: 2021-10-22

## 2021-10-19 RX ADMIN — INSULIN LISPRO 1 UNITS: 100 INJECTION, SOLUTION INTRAVENOUS; SUBCUTANEOUS at 21:26

## 2021-10-19 RX ADMIN — FAMOTIDINE 10 MG: 20 TABLET ORAL at 08:10

## 2021-10-19 RX ADMIN — Medication 2000 UNITS: at 08:10

## 2021-10-19 RX ADMIN — ASPIRIN 81 MG: 81 TABLET, COATED ORAL at 08:10

## 2021-10-19 RX ADMIN — SODIUM CHLORIDE, PRESERVATIVE FREE 10 ML: 5 INJECTION INTRAVENOUS at 21:26

## 2021-10-19 RX ADMIN — INSULIN LISPRO 3 UNITS: 100 INJECTION, SOLUTION INTRAVENOUS; SUBCUTANEOUS at 12:28

## 2021-10-19 RX ADMIN — ISOSORBIDE DINITRATE 10 MG: 10 TABLET ORAL at 15:09

## 2021-10-19 RX ADMIN — SODIUM BICARBONATE 650 MG TABLET 1300 MG: at 08:10

## 2021-10-19 RX ADMIN — CARVEDILOL 18.75 MG: 6.25 TABLET, FILM COATED ORAL at 17:49

## 2021-10-19 RX ADMIN — SODIUM BICARBONATE 650 MG TABLET 1300 MG: at 21:26

## 2021-10-19 RX ADMIN — SODIUM BICARBONATE 650 MG TABLET 1300 MG: at 15:09

## 2021-10-19 RX ADMIN — ISOSORBIDE DINITRATE 10 MG: 10 TABLET ORAL at 21:26

## 2021-10-19 RX ADMIN — ISOSORBIDE DINITRATE 10 MG: 10 TABLET ORAL at 08:10

## 2021-10-19 RX ADMIN — ATORVASTATIN CALCIUM 20 MG: 10 TABLET, FILM COATED ORAL at 08:10

## 2021-10-19 RX ADMIN — TAMSULOSIN HYDROCHLORIDE 0.4 MG: 0.4 CAPSULE ORAL at 08:10

## 2021-10-19 RX ADMIN — SODIUM CHLORIDE, PRESERVATIVE FREE 10 ML: 5 INJECTION INTRAVENOUS at 08:11

## 2021-10-19 RX ADMIN — CARVEDILOL 18.75 MG: 6.25 TABLET, FILM COATED ORAL at 08:10

## 2021-10-19 RX ADMIN — INSULIN LISPRO 3 UNITS: 100 INJECTION, SOLUTION INTRAVENOUS; SUBCUTANEOUS at 17:49

## 2021-10-19 ASSESSMENT — PAIN SCALES - WONG BAKER: WONGBAKER_NUMERICALRESPONSE: 0

## 2021-10-19 ASSESSMENT — PAIN SCALES - GENERAL
PAINLEVEL_OUTOF10: 0

## 2021-10-19 NOTE — CARE COORDINATION
[facility] has not arrived for transport. CM called - dispatch states crew is \"on scene and will be up shortly\". At [phone] crew still not here. CM called dispatch again and they said crew would be on unit in 10 minutes.

## 2021-10-19 NOTE — DISCHARGE SUMMARY
Hospital Medicine Discharge Summary    Patient ID: Sherri Dsouza      Patient's PCP: Daysi Fischer    Admit Date: 9/30/2021     Discharge Date: 10/20/2021      Admitting Physician: Alyssia Meng MD     Discharge Physician: Elizbeth Gitelman, MD     Discharge Diagnoses: Active Hospital Problems    Diagnosis     Elevated troponin [R77.8]     Cardiomyopathy ([facility] [address] [I42.9]     Normocytic anemia [D64.9]     Acute renal failure superimposed on chronic kidney disease (HCC) [N17.9, N18.9]     Acute UTI [N39.0]     Acute encephalopathy [G93.40]     DM (diabetes mellitus), type 2 ([facility] [address] [E11.9]     Hypertension [I10]     Hyperlipidemia [E78.5]     CAD (coronary artery disease) [I25.10]        The patient was seen and examined on day of discharge and this discharge summary is in conjunction with any daily progress note from day of discharge. Hospital Course:   BRBPR with anemia: no acute GIB noted inpatient with no need for endoscopy at this time  per GI. Continue to follow clinically for any reoccurrence. hgb stable     Acute metabolic encephalopathy: likely due to UTI, worsening of underlying dementia. Neuro assisted MRI  Brain-unable to obtain d/t sternal wires. Continue supportive care, avoid sedatives     ADRIAN on CKD : likely prerenal , had improved with IVF. nephro assisting. Cr uptrending . nephro assisting. Started on IVF. HD per nephro. Family decided on hospice , no more Hd       UTI with possible acute mastoditis:UC grew skin/urogenital rick. Completed treatment with IV ceftriaxone and IV Zyvox, d/obey on 10/4.     DMII with hypoglycemia: Lantus held with no further hypoglycemia. Continue SSI . Monitor and adjust insulin doses as needed     Cardiomyopathy : Ef 15-20% on echo. continue beta-blocker, hydralazine and nitrate combination. No ACE/ARB/ARNI/aldosterone antagonist due to renal insufficiency.     CAD with elevated trop : cardio assisting .  Stress test abnormal-EF 32%, large scar with no ischemia. Medical management recommended per cardio. Continue BB, aspirin, statin     Anemia: likely anemia of chronic disease due to CKD. on retacrit weekly       Urinary retention: persistent despite getting flomax and getting intermittent straight cath. UTI has been treated. Urology recs appreciated. Continue Ryan          Physical Exam Performed:     /61   Pulse 72   Temp 97.5 °F (36.4 °C) (Oral)   Resp 17   Ht 5' 8\" (1.727 m)   Wt 113 lb 5.1 oz (51.4 kg)   SpO2 99%   BMI 17.23 kg/m²       General appearance:  frail/gen weak, no apparent distress, appears stated age and cooperative. Confused. Legally blind  Neck: Supple, with full range of motion. No jugular venous distention. Trachea midline. Dialysis catheter in the right side of the neck  Respiratory:  Normal respiratory effort. Reduced air entry, bilaterally without Rales/Wheezes/Rhonchi. Cardiovascular: Regular rate and rhythm with normal S1/S2 without murmurs, rubs or gallops. Abdomen: Soft, non-tender, non-distended with normal bowel sounds. Musculoskeletal: No edema,  Skin:  Warm and dry, pale  Neurologic:  Legally blind otherwise Neurovascularly intact without any focal /motor deficits. Psychiatric: Alert,  limited insight  Labs: For convenience and continuity at follow-up the following most recent labs are provided:      CBC:    Lab Results   Component Value Date    WBC 4.4 10/17/2021    HGB 8.0 10/17/2021    HCT 23.3 10/17/2021    PLT 80 10/17/2021       Renal:    Lab Results   Component Value Date     10/20/2021    K 4.0 10/20/2021    K 3.6 10/04/2021    CL 95 10/20/2021    CO2 27 10/20/2021    BUN 36 10/20/2021    CREATININE 3.8 10/20/2021    CALCIUM 8.5 10/20/2021         Significant Diagnostic Studies    Radiology:   IR REMOVE TUNNELED CVAD WO SQ PORT/PUMP   Final Result   Status post successful removal of right internal jugular tunneled dialysis   catheter as described above.          IR REPLACE TUNNELED CVC WO SQ PORT/PUMP SAME ACCESS   Final Result   1. Tunneled dialysis catheter working appropriately. 2. Pursestring suture at the catheter insertion site, to be removed in 2 days. IR TUNNELED CVC PLACE WO SQ PORT/PUMP > 5 YEARS   Final Result   Successful ultrasound and fluoroscopy guided tunneled catheter placement with   catheter tip in the right atrium. NM Cardiac Stress Test Nuclear Imaging   Final Result      VL Extremity Venous Left   Final Result      XR CHEST PORTABLE   Final Result   1. Congestive heart failure. US RETROPERITONEAL COMPLETE   Final Result   Grossly unremarkable ultrasound of the kidneys and urinary bladder. No   hydronephrosis. CT HEAD WO CONTRAST   Final Result   No acute intracranial abnormality. Small right mastoid effusion                Consults:     IP CONSULT TO NEPHROLOGY  IP CONSULT TO CARDIOLOGY  IP CONSULT TO NEUROLOGY  IP CONSULT TO GI  IP CONSULT TO HOSPICE    Disposition:  LTC hospice     Condition at Discharge: Terminal    Discharge Instructions/Follow-up:  Hospice     Code Status:  Prior     Activity: activity as tolerated    Diet: regular diet      Discharge Medications:     Discharge Medication List as of 10/19/2021  3:37 PM           Details   isosorbide dinitrate (ISORDIL) 10 MG tablet Take 1 tablet by mouth 3 times daily, Disp-90 tablet, R-3Normal      !! insulin lispro (HUMALOG) 100 UNIT/ML injection vial Inject 0-3 Units into the skin nightly, Disp-10 mL, R-3Normal      !! insulin lispro (HUMALOG) 100 UNIT/ML injection vial Inject 0-6 Units into the skin 3 times daily (with meals), Disp-10 mL, R-3Normal      tamsulosin (FLOMAX) 0.4 MG capsule Take 1 capsule by mouth daily, Disp-30 capsule, R-3Normal      famotidine (PEPCID) 10 MG tablet Take 1 tablet by mouth daily, Disp-60 tablet, R-3Normal       !! - Potential duplicate medications found. Please discuss with provider.            Details   carvedilol (COREG) 6.25 MG tablet Take 3 tablets by mouth 2 times daily (with meals), Disp-60 tablet, R-3Normal              Details   atorvastatin (LIPITOR) 40 MG tablet Take 40 mg by mouth nightlyHistorical Med      guaiFENesin (ROBITUSSIN) 100 MG/5ML liquid Take 200 mg by mouth 3 times daily as needed for CoughHistorical Med      sodium bicarbonate 650 MG tablet Take 650 mg by mouth dailyHistorical Med      aspirin 81 MG tablet Take 81 mg by mouth daily      glucose blood VI test strips (ACCU-CHEK ROBERT) STRP 1 Container by In Vitro route daily, Disp-100 strip, R-3      Insulin Pen Needle 31G X 8 MM MISC DAILY Starting 11/4/2014, Until Discontinued, Disp-100 each, R-3, Normal      Cholecalciferol (VITAMIN D3) 2000 UNITS CAPS Take  by mouth daily. Iron TABS Take 25 mg by mouth. Omega-3 Fatty Acids (OMEGA 3 PO) Take 1,200 mg by mouth 3 times daily. Time Spent on discharge is more   45 minutes in the examination, evaluation, counseling and review of medications and discharge plan. Signed:    Martin Schultz MD   10/24/2021      Thank you Evy Mcallister for the opportunity to be involved in this patient's care. If you have any questions or concerns please feel free to contact me at [phone].

## 2021-10-19 NOTE — CARE COORDINATION
CM called patient's son, Lucie aGrcia, for f/u on discharge plan. anna states he spoke with his brother last evening and they have chosen to stop patient's hemodialysis and pursue hospice care at [facility]. Per his request, referral called to [facility] staff, [place]. Face sheet faxed to agency. Primary nurse, Krystin Pineda, notified to NOT send pt to HD today. Perfect serve message sent to MD with updates as to above. 9:37 AM  In the interest of a timely discharge, JUDIT contacted [facility] and requested a [phone] . First available is 1630 - booked per JUDIT.     Pending call back from [facility] for ETA on liaison arrival.

## 2021-10-19 NOTE — PROGRESS NOTES
neck  Respiratory:  Normal respiratory effort. Reduced air entry, bilaterally without Rales/Wheezes/Rhonchi. Cardiovascular: Regular rate and rhythm with normal S1/S2 without murmurs, rubs or gallops. Abdomen: Soft, non-tender, non-distended with normal bowel sounds. Musculoskeletal: No edema,  Skin:  Warm and dry, pale  Neurologic:  Legally blind otherwise Neurovascularly intact without any focal /motor deficits. Psychiatric: Alert,  limited insight       Labs:   Recent Labs     10/17/21  0544   WBC 4.4   HGB 8.0*   HCT 23.3*   PLT 80*     Recent Labs     10/17/21  0545 10/18/21  0524 10/19/21  0735   * 135* 134*   K 4.1 3.8 4.4   CL 93* 96* 94*   CO2 26 27 28   BUN 14 23* 32*   CREATININE 2.3* 2.9* 3.7*   CALCIUM 8.3 8.2* 8.8     No results for input(s): AST, ALT, BILIDIR, BILITOT, ALKPHOS in the last 72 hours. No results for input(s): INR in the last 72 hours. No results for input(s): Rafy Shackle in the last 72 hours. Urinalysis:      Lab Results   Component Value Date    NITRU Negative 09/30/2021    WBCUA 21-50 09/30/2021    BACTERIA 3+ 09/30/2021    RBCUA 5-10 09/30/2021    BLOODU SMALL 09/30/2021    SPECGRAV 1.015 09/30/2021    GLUCOSEU Negative 09/30/2021       Radiology:  IR REPLACE TUNNELED CVC WO SQ PORT/PUMP SAME ACCESS   Final Result   1. Tunneled dialysis catheter working appropriately. 2. Pursestring suture at the catheter insertion site, to be removed in 2 days. IR TUNNELED CVC PLACE WO SQ PORT/PUMP > 5 YEARS   Final Result   Successful ultrasound and fluoroscopy guided tunneled catheter placement with   catheter tip in the right atrium. NM Cardiac Stress Test Nuclear Imaging   Final Result      VL Extremity Venous Left   Final Result      XR CHEST PORTABLE   Final Result   1. Congestive heart failure. US RETROPERITONEAL COMPLETE   Final Result   Grossly unremarkable ultrasound of the kidneys and urinary bladder. No   hydronephrosis.          CT HEAD ADULT DIET; Regular; 4 carb choices (60 gm/meal)  Adult Oral Nutrition Supplement;  Renal Oral Supplement  Code Status: Full Code    PT/OT Eval Status: consulted for skilled nursing facility    [place] [id number] hospice today    Mikey Melo MD

## 2021-10-19 NOTE — PROGRESS NOTES
RADIOLOGY:  Patient status post successful removal of right internal jugular tunneled dialysis catheter. Patient tolerated the procedure well. Full report to follow.

## 2021-10-19 NOTE — PROGRESS NOTES
TUNNEL REMOVED FROM RIGHT SIDE OF CHEST WITH NO ISSUES REPORT CALLED TO NURSE.  PRESSURE DRESSING APPLIED

## 2021-10-19 NOTE — PROGRESS NOTES
[facility]    [facility]              [phone]                         Interval History and plan:     Seen this morning  Patient still does not have normal mental status  Reviewed the lab from case management  Plan for discharge to hospice    Plan:   Likely discharge to hospice today  We will not continue dialysis for now on   Please call with questions      Acute Kidney Injury/ CKD 4- now ESRD  ADRIAN likely due to -prerenal etiology  Cr on consultation 3.3  Baseline Cr- 3  CKD likely due to diabetes hypertension recurrent ADRIAN  He was admitted to [facility] on 6/29- 7/8/21- with ADRIAN  Cr at DC was 3.38 from peak of 4.75(pre-renal)  UA- small blood, small nitrite, wbc 21-50  Renal Imaging:-10/21-right kidney 8.2 cm, left 8.3 cm, no hydronephrosis  Echo: 9/21-EF 15 to 21%, grade 2 diastolic dysfunction  7/38- nromal EF, RV pressure < 35 mmHg      Acute metabolic encephalopathy  f underlying dementia. Neuro assisted MRI  Brain-unable to obtain d/t sternal wires. Continue supportive care, avoid sedatives     UTI   . Completed treatment with IV ceftriaxone and IV Zyvox, d/obey on 10/4.     DMII        Cardiomyopathy   : Ef 15-20%     CAD   with elevated trop :     Medical management recommended per cardio. Continue BB, aspirin, statin     Anemia  : likely anemia of chronic disease due to CKD. on retacrit weekly       Urinary retention   flomax    intermittent straight cath. UTI   has been treated.     Urology   Ryan           Hypertension   BP: (118-135)/(49-56)  Pulse:  [71-77]   BP goal inpatient [phone] systolic inpatient    Anemia of CKD  Iron sat normal  Hb 9.5 on consult    Acidosis  Oral bicarb     BPH  History of renal stones  CAD  Carotid artery occlusion  Diabetes mellitus        [facility] would like to thank Bran Haynes MD   for opportunity to serve this patient      Please call with questions at-   24 Hrs Answering service [phone] or  7 am- 5 pm via Perfect serve or cell phone  Amber Ballesteros MD          CC/reason for consult :     ADRIAN     HPI :     Paul Zarate is a 78 y.o. male presented to   the hospital on 9/30/2021 with altered mental status from nursing facility. He is unable to provide details. Per reports he has had bright red blood per rectum. He was brought to the emergency room where work-up showed possible inferolateral ischemia on the heart and had QTc prolongation. CT scan of the head was done which was negative. He is scheduled to do MRI but has not done yet. He was found to have metabolic acidosis, ADRIAN. Also elevation of troponin. He had a UA done which showed possible infection, culture is pending also blood culture is sent and results are pending. He is currently on IV antibiotics. We are consulted for ADRIAN and related issues    ROS:     Positives Listed Bold. All other remaining systems are negative. Constitutional:  fever, chills, weakness, weight change, fatigue,      Skin:  rash, pruritus, hair loss, bruising, dry skin, petechiae. Head, Face, Neck   headaches, swelling,  cervical adenopathy. Respiratory: shortness of breath, cough, or wheezing  Cardiovascular: chest pain, palpitations, dizzy, edema  Gastrointestinal: nausea, vomiting, diarrhea, constipation,belly pain    Yellow skin, blood in stool  Musculoskeletal:  back pain, muscle weakness, gait problems,       joint pain or swelling. Genitourinary:  dysuria, poor urine flow, flank pain, blood in urine  Neurologic:  vertigo, TIA'S, syncope, seizures, focal weakness  Psychosocial:  insomnia, anxiety, or depression.   Additional positive findings: -         PMH/PSH/SH/Family History:     Past Medical History:   Diagnosis Date    CAD (coronary artery disease)     Chronic renal disease     Dr. 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 ([facility] [address]     Type II or unspecified type diabetes mellitus without mention of complication, not stated as uncontrolled        Past Surgical History:   Procedure Laterality Date    CARDIAC SURGERY  2006    quad by pass    COLONOSCOPY      pt refuses to have one    EYE SURGERY Bilateral 2003    IR TUNNELED CATHETER PLACEMENT GREATER THAN 5 YEARS  10/11/2021    IR TUNNELED CATHETER PLACEMENT GREATER THAN 5 YEARS 10/11/2021 [facility]    KIDNEY STONE SURGERY  1996    LUNG SURGERY  2006    scraped lungs        reports that he has never smoked. He has never used smokeless tobacco. He reports that he does not drink alcohol and does not use drugs. family history includes Diabetes in his mother.          Medication:     Current Facility-Administered Medications: famotidine (PEPCID) tablet 10 mg, 10 mg, Oral, Daily  epoetin long-epbx (RETACRIT) injection 1,000 Units, 1,000 Units, SubCUTAneous, Weekly  carvedilol (COREG) tablet 18.75 mg, 18.75 mg, Oral, BID WC  sodium bicarbonate tablet 1,300 mg, 1,300 mg, Oral, TID  [Held by provider] hydrALAZINE (APRESOLINE) tablet 10 mg, 10 mg, Oral, 3 times per day  insulin lispro (HUMALOG) injection vial 0-6 Units, 0-6 Units, SubCUTAneous, TID WC  insulin lispro (HUMALOG) injection vial 0-3 Units, 0-3 Units, SubCUTAneous, Nightly  tamsulosin (FLOMAX) capsule 0.4 mg, 0.4 mg, Oral, Daily  isosorbide dinitrate (ISORDIL) tablet 10 mg, 10 mg, Oral, TID  [Held by provider] insulin glargine (LANTUS) injection vial 14 Units, 14 Units, SubCUTAneous, Nightly  atorvastatin (LIPITOR) tablet 20 mg, 20 mg, Oral, Daily  aspirin EC tablet 81 mg, 81 mg, Oral, Daily  sodium chloride flush 0.9 % injection 10 mL, 10 mL, IntraVENous, 2 times per day  sodium chloride flush 0.9 % injection 10 mL, 10 mL, IntraVENous, PRN  0.9 % sodium chloride infusion, 25 mL, IntraVENous, PRN  magnesium sulfate 2000 mg in 50 mL IVPB premix, 2,000 mg, IntraVENous, PRN  promethazine (PHENERGAN) tablet 12.5 mg, 12.5 mg, Oral, Q6H PRN **OR** ondansetron (ZOFRAN) injection 4 mg, 4 mg, IntraVENous, Q6H PRN  senna (SENOKOT) tablet 8.6 mg, 1 tablet, Oral, Daily PRN  perflutren lipid microspheres (DEFINITY) injection 1.65 mg, 1.5 mL, IntraVENous, ONCE PRN  glucose (GLUTOSE) 40 % oral gel 15 g, 15 g, Oral, PRN  dextrose 50 % IV solution, 12.5 g, IntraVENous, PRN  glucagon (rDNA) injection 1 mg, 1 mg, IntraMUSCular, PRN  dextrose 5 % solution, 100 mL/hr, IntraVENous, PRN  Vitamin D (CHOLECALCIFEROL) tablet 2,000 Units, 2,000 Units, Oral, Daily       Vitals :     Vitals:    10/19/21 0800   BP: (!) 135/56   Pulse: 71   Resp: 18   Temp: 97.5 °F (36.4 °C)   SpO2: 98%       I & O :       Intake/Output Summary (Last 24 hours) at 10/19/2021 1126  Last data filed at 10/18/2021 1805  Gross per 24 hour   Intake 120 ml   Output 250 ml   Net -130 ml        Physical Examination :     General appearance: male in NAD, alert and awake. HEENT: EOM intact, no icterus. Trachea is midline. Neck : No mass, appears symmetrical, no JVD   Respiratory: Respiratory effort appears normal, no wheeze, no crackles  Cardiovascular: Ausculation- No M/R/G, no edema  Abdomen: No visible mass or tenderness  Musculoskeletal:  No clubbing,cyanosis, joints with no swelling or deformity. Skin:no rashes, ulcers, induration, no jaundice. Neuro: face symmetric, no focal deficits. Appropriate responses.  CN 2-12 grossly intact    Additional findings:-        LABS:     Recent Labs     10/17/21  0544   WBC 4.4   HGB 8.0*   HCT 23.3*   PLT 80*     Recent Labs     10/17/21  0545 10/18/21  0524 10/19/21  0735   * 135* 134*   K 4.1 3.8 4.4   CL 93* 96* 94*   CO2 26 27 28   BUN 14 23* 32*   CREATININE 2.3* 2.9* 3.7*   GLUCOSE 136* 161* 176*

## 2021-10-19 NOTE — ACP (ADVANCE CARE PLANNING)
ADVANCED CARE PLANNING - REMOTE     Name:Christoph Larose       :  1945              MRN:  [id number]  Admission Date: 2021  1:51 PM  Date of Discussion: 10/19/2021      Purpose of Encounter: Advanced care planning in light of end-stage renal failure  Parties in attendance: :Randee Davis, Adrián Wills MD, Family members son-Thuan WYATT, [facility] Capacity:No, patient does not have decision-making capacity    Remote ACP visit was performed based on new provisions and guidance offered by [facility] on 2020 in setting of COVID 19 outbreak and in order to preserve personal protective equipment in accordance with the flexibilities announced by CMS on 2020. References:   https://[facility]. [place].[facility]/Portals/0/Resources/COVID-19/3_18%20Telemed%20Guidance%20Updated%20March%2018. pdf?[email or web address]   [email or web address] pdf     Objective/Medical Story:   78 male was admitted with acute encephalopathy and found to have acute kidney injury on chronic kidney disease. He has been having multiple medical problems including anemia possible GI bleed diabetes CAD, blindness chronic physical debility and dementia. Family decided not to treat aggressively and not to go for hemodialysis. Considered comfort care at the end of life. Would like to take him to the long-term care where he lives with hospice services. Discussed with son about a morphine allergy and he told that he gets hallucination with morphine but does no history of anaphylaxis. He is in agreement to treat his father with morphine when he is in pain. Active Diagnoses:     Active Hospital Problems    Diagnosis Date Noted    Elevated troponin [R77.8]     Cardiomyopathy ([address] [I42.9]     Normocytic anemia [D64.9]     Acute renal failure superimposed on chronic kidney disease ([address] [N17.9, N18.9] 2021    Acute UTI [N39.0] 2021    Acute encephalopathy [G93.40] 09/30/2021    DM (diabetes mellitus), type 2 ([facility] [address] [E11.9]     Hypertension [I10]     Hyperlipidemia [E78.5]     CAD (coronary artery disease) [I25.10]        These active diagnoses are of sufficient risk that focused discussion on advance care planning is indicated in order to allow the patient to thoughtfully consider personal goals of care; and if situations arise that prevent the ability to personally give input, to ensure appropriate representation of their personal desires for different levels and agressiveness of care. Goals of Care Determinations: Patient wishes to focus on comfort care  Code Status: At this time patient wishes to be DO NOT RESUSCITATE    Time Spent:     Total time spent face-to-face in education and discussion: 16 minutes. Electronically signed by Bran Haynes MD on 10/19/2021 at 3:56 PM    Thank you Luz Maria Birmingham for the opportunity to be involved in this patient's care. If you have any questions or concerns please feel free to contact me at [phone].

## 2021-10-19 NOTE — PROGRESS NOTES
Pt has discharge order but needs vas cath removed. Called IR. They need order. Got verbal order to remove vas cath. Order is in. Called IR. Called son and got consent to remove vas cath. Pt sent down to remove vas cath. pts DNR paper signed by attending and placed in charts for [address] discharge. Called [facility] to make them aware that pt is not coming today. Tried 4 times. No one answered. left voicemail to Director of nursing/ on call supervisor.      1710 pt is back from vas cath removal.

## 2021-10-19 NOTE — CARE COORDINATION
CASE MANAGEMENT DISCHARGE SUMMARY      Discharge to: [address] with [facility]    IMM given: (date)     Transportation:    Medical Transport explained to [employer]. Pt/family voice no agency preference. Agency used: [address] [place] [address] Road up time: 1630   Ambulance form completed: Yes    Confirmed discharge plan with:     Patient: no. Altered mental status     Family:  Yes. CM spoke with son, Soto Archibald, multiple times, as well as hospice nurse with [facility]     Facility/Agency, name:  [facility] faxed to facility and admissions staff, Chris 5904 notified     Phone number for report to facility: [phone]     RN, name: Lazarusus Garcia    Note: Discharging nurse to complete [facility], reconcile [facility], and place final copy with patient's discharge packet. RN to ensure that written prescriptions for Level II medications are sent with patient to the facility as per protocol.     Brenda Cintron RN

## 2021-10-20 VITALS
SYSTOLIC BLOOD PRESSURE: 123 MMHG | HEIGHT: 68 IN | WEIGHT: 113.32 LBS | DIASTOLIC BLOOD PRESSURE: 61 MMHG | OXYGEN SATURATION: 99 % | RESPIRATION RATE: 17 BRPM | BODY MASS INDEX: 17.17 KG/M2 | TEMPERATURE: 97.5 F | HEART RATE: 72 BPM

## 2021-10-20 LAB
ANION GAP SERPL CALCULATED.3IONS-SCNC: 13 MMOL/L (ref 3–16)
BUN BLDV-MCNC: 36 MG/DL (ref 7–20)
CALCIUM SERPL-MCNC: 8.5 MG/DL (ref 8.3–10.6)
CHLORIDE BLD-SCNC: 95 MMOL/L (ref 99–110)
CO2: 27 MMOL/L (ref 21–32)
CREAT SERPL-MCNC: 3.8 MG/DL (ref 0.8–1.3)
GFR AFRICAN AMERICAN: 19
GFR NON-AFRICAN AMERICAN: 16
GLUCOSE BLD-MCNC: 179 MG/DL (ref 70–99)
GLUCOSE BLD-MCNC: 191 MG/DL (ref 70–99)
PERFORMED ON: ABNORMAL
POTASSIUM SERPL-SCNC: 4 MMOL/L (ref 3.5–5.1)
SODIUM BLD-SCNC: 135 MMOL/L (ref 136–145)

## 2021-10-20 PROCEDURE — 80048 BASIC METABOLIC PNL TOTAL CA: CPT

## 2021-10-20 PROCEDURE — 6370000000 HC RX 637 (ALT 250 FOR IP): Performed by: INTERNAL MEDICINE

## 2021-10-20 PROCEDURE — 36415 COLL VENOUS BLD VENIPUNCTURE: CPT

## 2021-10-20 PROCEDURE — 6370000000 HC RX 637 (ALT 250 FOR IP): Performed by: HOSPITALIST

## 2021-10-20 PROCEDURE — 6370000000 HC RX 637 (ALT 250 FOR IP): Performed by: NURSE PRACTITIONER

## 2021-10-20 RX ADMIN — FAMOTIDINE 10 MG: 20 TABLET ORAL at 09:51

## 2021-10-20 RX ADMIN — SODIUM BICARBONATE 650 MG TABLET 1300 MG: at 09:51

## 2021-10-20 RX ADMIN — TAMSULOSIN HYDROCHLORIDE 0.4 MG: 0.4 CAPSULE ORAL at 09:51

## 2021-10-20 RX ADMIN — ISOSORBIDE DINITRATE 10 MG: 10 TABLET ORAL at 09:51

## 2021-10-20 RX ADMIN — Medication 2000 UNITS: at 09:50

## 2021-10-20 RX ADMIN — ATORVASTATIN CALCIUM 20 MG: 10 TABLET, FILM COATED ORAL at 09:50

## 2021-10-20 RX ADMIN — ASPIRIN 81 MG: 81 TABLET, COATED ORAL at 09:51

## 2021-10-20 RX ADMIN — INSULIN LISPRO 1 UNITS: 100 INJECTION, SOLUTION INTRAVENOUS; SUBCUTANEOUS at 09:54

## 2021-10-20 RX ADMIN — CARVEDILOL 18.75 MG: 6.25 TABLET, FILM COATED ORAL at 09:52

## 2021-10-20 ASSESSMENT — PAIN SCALES - GENERAL: PAINLEVEL_OUTOF10: 0

## 2021-10-20 NOTE — PROGRESS NOTES
Transport picked up pt to take to [facility]. Ryan in place, IV removed. Script for morphine and ativan with pt. Pt d/c'd with all belongings. Report called to [facility].

## 2021-10-20 NOTE — CARE COORDINATION
Pt discharge cancelled yesterday, 10/19 @ 1600 with [facility] d/t vascath not being discontinued. Transport rescheduled for [phone] today after CM confirmed with RN that HD line was removed. 10:40 AM  Transport here now for patient. CM left  for Catherine with [facility] to update on discharge status. CM also called and spoke with patient's son, Luis Ibanez, regarding transport/discharge.

## 2021-10-20 NOTE — PROGRESS NOTES
Occupational Therapy   Treatment Attempt Note    Attempted OT treatment this date, however per RN, pt being picked up soon to discharge to SNF with Hospice. OT will sign off.     Bijan Hall, OTR/L

## 2023-02-06 NOTE — PROGRESS NOTES
Hospitalist Progress Note      PCP: Kemi Echeverria    Date of Admission: 9/30/2021    Chief Complaint: [address] Course: H&P reviewed     Subjective: Ryan in place. Patient resting comfortably and denies any complaints. Patient reportedly found on his knees beside bed overnight. Patient's son updated on plan of care, plan to consult Hospice per son request in am.      Medications:  Reviewed    Infusion Medications    sodium chloride Stopped (10/02/21 0344)    dextrose       Scheduled Medications    famotidine  10 mg Oral Daily    epoetin long-epbx  1,000 Units SubCUTAneous Weekly    carvedilol  18.75 mg Oral BID WC    sodium bicarbonate  1,300 mg Oral TID    [Held by provider] hydrALAZINE  10 mg Oral 3 times per day    insulin lispro  0-6 Units SubCUTAneous TID WC    insulin lispro  0-3 Units SubCUTAneous Nightly    tamsulosin  0.4 mg Oral Daily    isosorbide dinitrate  10 mg Oral TID    [Held by provider] insulin glargine  14 Units SubCUTAneous Nightly    atorvastatin  20 mg Oral Daily    aspirin  81 mg Oral Daily    sodium chloride flush  10 mL IntraVENous 2 times per day    Vitamin D  2,000 Units Oral Daily     PRN Meds: sodium chloride flush, sodium chloride, magnesium sulfate, promethazine **OR** ondansetron, senna, perflutren lipid microspheres, glucose, dextrose, glucagon (rDNA), dextrose      Intake/Output Summary (Last 24 hours) at 10/17/2021 1625  Last data filed at 10/17/2021 1448  Gross per 24 hour   Intake 160 ml   Output 100 ml   Net 60 ml       Physical Exam Performed:    BP (!) 126/47   Pulse 78   Temp 98.1 °F (36.7 °C) (Axillary)   Resp 16   Ht 5' 8\" (1.727 m)   Wt 113 lb 5.1 oz (51.4 kg)   SpO2 95%   BMI 17.23 kg/m²      General appearance:  frail/gen weak, no apparent distress, appears stated age and cooperative. Confused. Legally blind  HEENT: Pupils equal, round  Neck: Supple, with full range of motion. No jugular venous distention.  Trachea midline. Respiratory:  Normal respiratory effort. Clear to auscultation, bilaterally without Rales/Wheezes/Rhonchi. Cardiovascular: Regular rate and rhythm with normal S1/S2 without murmurs, rubs or gallops. Abdomen: Soft, non-tender, non-distended with normal bowel sounds. Musculoskeletal: No edema, SOTO  Skin:  Warm and dry, pale  Neurologic:  Legally blind otherwise Neurovascularly intact without any focal sensory/motor deficits. Psychiatric: Alert,  limited insight       Labs:   Recent Labs     10/16/21  0643 10/17/21  0544   WBC 4.6 4.4   HGB 7.5* 8.0*   HCT 21.5* 23.3*   PLT 63* 80*     Recent Labs     10/15/21  0943 10/16/21  0643 10/17/21  0545   * 134* 130*   K 3.8 3.5 4.1   CL 95* 97* 93*   CO2 27 26 26   BUN 16 23* 14   CREATININE 2.7* 3.2* 2.3*   CALCIUM 8.1* 8.3 8.3     No results for input(s): AST, ALT, BILIDIR, BILITOT, ALKPHOS in the last 72 hours. No results for input(s): INR in the last 72 hours. No results for input(s): Daniela Kras in the last 72 hours. Urinalysis:      Lab Results   Component Value Date    NITRU Negative 09/30/2021    WBCUA 21-50 09/30/2021    BACTERIA 3+ 09/30/2021    RBCUA 5-10 09/30/2021    BLOODU SMALL 09/30/2021    SPECGRAV 1.015 09/30/2021    GLUCOSEU Negative 09/30/2021       Radiology:  IR REPLACE TUNNELED CVC WO SQ PORT/PUMP SAME ACCESS   Final Result   1. Tunneled dialysis catheter working appropriately. 2. Pursestring suture at the catheter insertion site, to be removed in 2 days. IR TUNNELED CVC PLACE WO SQ PORT/PUMP > 5 YEARS   Final Result   Successful ultrasound and fluoroscopy guided tunneled catheter placement with   catheter tip in the right atrium. NM Cardiac Stress Test Nuclear Imaging   Final Result      VL Extremity Venous Left   Final Result      XR CHEST PORTABLE   Final Result   1. Congestive heart failure.          US RETROPERITONEAL COMPLETE   Final Result   Grossly unremarkable ultrasound of the kidneys and urinary bladder. No   hydronephrosis. CT HEAD WO CONTRAST   Final Result   No acute intracranial abnormality. Small right mastoid effusion                 Assessment/Plan:    Active Hospital Problems    Diagnosis     Elevated troponin [R77.8]     Cardiomyopathy [facility] [I42.9]     Normocytic anemia [D64.9]     Acute renal failure superimposed on chronic kidney disease (HCC) [N17.9, N18.9]     Acute UTI [N39.0]     Acute encephalopathy [G93.40]     DM (diabetes mellitus), type 2 ([facility] [E11.9]     Hypertension [I10]     Hyperlipidemia [E78.5]     CAD (coronary artery disease) [I25.10]      BRBPR with anemia: no acute GIB noted inpatient with no need for endoscopy at this time  per GI. Continue to follow clinically for any reoccurrence. hgb stable    Acute metabolic encephalopathy: likely due to UTI, worsening of underlying dementia. Neuro assisted MRI  Brain-unable to obtain d/t sternal wires. Continue supportive care, avoid sedatives    ADRIAN on CKD : likely prerenal , had improved with IVF. nephro assisting. Cr uptrending . nephro assisting. Started on IVF. HD per nephro. Monitor and avoid nephrotoxins    UTI with possible acute mastoditis:UC grew skin/urogenital rick. Completed treatment with IV ceftriaxone and IV Zyvox, d/obey on 10/4. DMII with hypoglycemia: Lantus held with no further hypoglycemia. Continue SSI . Monitor and adjust insulin doses as needed    Cardiomyopathy : Ef 15-20% on echo. continue beta-blocker, hydralazine and nitrate combination. No ACE/ARB/ARNI/aldosterone antagonist due to renal insufficiency. CAD with elevated trop : cardio assisting . Stress test abnormal-EF 32%, large scar with no ischemia. Medical management recommended per cardio. Continue BB, aspirin, statin    Anemia: likely anemia of chronic disease due to CKD. on retacrit weekly      Urinary retention: persistent despite getting flomax and getting intermittent straight cath.   UTI has been treated. Urology recs appreciated. Continue Ryan    DVT Prophylaxis: scds  Diet: ADULT DIET; Regular; 4 carb choices (60 gm/meal)  Adult Oral Nutrition Supplement;  Renal Oral Supplement  Code Status: Full Code    PT/OT Eval Status: consulted    Dispo -HD 10/16, medically stable for discharge when SNF bed available    Zaid Pino, APRN - CNP Ambulatory